# Patient Record
Sex: FEMALE | Race: WHITE | NOT HISPANIC OR LATINO | Employment: OTHER | URBAN - METROPOLITAN AREA
[De-identification: names, ages, dates, MRNs, and addresses within clinical notes are randomized per-mention and may not be internally consistent; named-entity substitution may affect disease eponyms.]

---

## 2018-09-27 ENCOUNTER — HISTORICAL (OUTPATIENT)
Dept: RADIOLOGY | Facility: HOSPITAL | Age: 68
End: 2018-09-27

## 2020-07-22 ENCOUNTER — HISTORICAL (OUTPATIENT)
Dept: ADMINISTRATIVE | Facility: HOSPITAL | Age: 70
End: 2020-07-22

## 2020-07-29 ENCOUNTER — HISTORICAL (OUTPATIENT)
Dept: ADMINISTRATIVE | Facility: HOSPITAL | Age: 70
End: 2020-07-29

## 2020-07-29 LAB
ABS NEUT (OLG): 10.24 X10(3)/MCL (ref 2.1–9.2)
ALBUMIN SERPL-MCNC: 2.3 GM/DL (ref 3.4–5)
ALBUMIN/GLOB SERPL: 0.5 RATIO (ref 1.1–2)
ALP SERPL-CCNC: 64 UNIT/L (ref 40–150)
ALT SERPL-CCNC: 7 UNIT/L (ref 0–55)
AST SERPL-CCNC: 9 UNIT/L (ref 5–34)
BASOPHILS # BLD AUTO: 0.1 X10(3)/MCL (ref 0–0.2)
BASOPHILS NFR BLD AUTO: 1 %
BILIRUB SERPL-MCNC: 0.2 MG/DL
BILIRUBIN DIRECT+TOT PNL SERPL-MCNC: 0.1 MG/DL (ref 0–0.5)
BILIRUBIN DIRECT+TOT PNL SERPL-MCNC: 0.1 MG/DL (ref 0–0.8)
BUN SERPL-MCNC: 13.7 MG/DL (ref 9.8–20.1)
CALCIUM SERPL-MCNC: 8.7 MG/DL (ref 8.4–10.2)
CHLORIDE SERPL-SCNC: 106 MMOL/L (ref 98–107)
CO2 SERPL-SCNC: 25 MMOL/L (ref 23–31)
CREAT SERPL-MCNC: 0.86 MG/DL (ref 0.55–1.02)
CRP SERPL HS-MCNC: 63.18 MG/DL
EOSINOPHIL # BLD AUTO: 0.1 X10(3)/MCL (ref 0–0.9)
EOSINOPHIL NFR BLD AUTO: 0 %
ERYTHROCYTE [DISTWIDTH] IN BLOOD BY AUTOMATED COUNT: 15.9 % (ref 11.5–17)
ERYTHROCYTE [SEDIMENTATION RATE] IN BLOOD: 91 MM/HR (ref 0–20)
EST. AVERAGE GLUCOSE BLD GHB EST-MCNC: 154.2 MG/DL
GLOBULIN SER-MCNC: 4.3 GM/DL (ref 2.4–3.5)
GLUCOSE SERPL-MCNC: 222 MG/DL (ref 82–115)
HBA1C MFR BLD: 7 %
HCT VFR BLD AUTO: 32.5 % (ref 37–47)
HGB BLD-MCNC: 9.6 GM/DL (ref 12–16)
LYMPHOCYTES # BLD AUTO: 2 X10(3)/MCL (ref 0.6–4.6)
LYMPHOCYTES NFR BLD AUTO: 15 %
MCH RBC QN AUTO: 25.1 PG (ref 27–31)
MCHC RBC AUTO-ENTMCNC: 29.5 GM/DL (ref 33–36)
MCV RBC AUTO: 85.1 FL (ref 80–94)
MONOCYTES # BLD AUTO: 0.8 X10(3)/MCL (ref 0.1–1.3)
MONOCYTES NFR BLD AUTO: 6 %
NEUTROPHILS # BLD AUTO: 10.24 X10(3)/MCL (ref 2.1–9.2)
NEUTROPHILS NFR BLD AUTO: 77 %
PLATELET # BLD AUTO: 448 X10(3)/MCL (ref 130–400)
PMV BLD AUTO: 8.5 FL (ref 9.4–12.4)
POTASSIUM SERPL-SCNC: 3.9 MMOL/L (ref 3.5–5.1)
PREALB SERPL-MCNC: 13 MG/DL (ref 14–37)
PROT SERPL-MCNC: 6.6 GM/DL (ref 5.8–7.6)
RBC # BLD AUTO: 3.82 X10(6)/MCL (ref 4.2–5.4)
SODIUM SERPL-SCNC: 137 MMOL/L (ref 136–145)
WBC # SPEC AUTO: 13.2 X10(3)/MCL (ref 4.5–11.5)

## 2020-08-05 ENCOUNTER — HISTORICAL (OUTPATIENT)
Dept: ADMINISTRATIVE | Facility: HOSPITAL | Age: 70
End: 2020-08-05

## 2020-08-12 ENCOUNTER — HISTORICAL (OUTPATIENT)
Dept: ADMINISTRATIVE | Facility: HOSPITAL | Age: 70
End: 2020-08-12

## 2020-08-19 ENCOUNTER — HISTORICAL (OUTPATIENT)
Dept: ADMINISTRATIVE | Facility: HOSPITAL | Age: 70
End: 2020-08-19

## 2020-08-19 LAB
ABS NEUT (OLG): 7.67 X10(3)/MCL (ref 2.1–9.2)
ALBUMIN SERPL-MCNC: 2.4 GM/DL (ref 3.4–4.8)
ALBUMIN/GLOB SERPL: 0.6 RATIO (ref 1.1–2)
ALP SERPL-CCNC: 66 UNIT/L (ref 40–150)
ALT SERPL-CCNC: 6 UNIT/L (ref 0–55)
AST SERPL-CCNC: 10 UNIT/L (ref 5–34)
BASOPHILS # BLD AUTO: 0.07 X10(3)/MCL (ref 0–0.2)
BASOPHILS NFR BLD AUTO: 0.7 % (ref 0–1)
BILIRUB SERPL-MCNC: 0.3 MG/DL (ref 0.2–1.2)
BILIRUBIN DIRECT+TOT PNL SERPL-MCNC: 0.1 MG/DL (ref 0–0.8)
BILIRUBIN DIRECT+TOT PNL SERPL-MCNC: 0.2 MG/DL (ref 0–0.5)
BUN SERPL-MCNC: 11.2 MG/DL (ref 9.8–20.1)
CALCIUM SERPL-MCNC: 9 MG/DL (ref 8.4–10.2)
CHLORIDE SERPL-SCNC: 107 MMOL/L (ref 98–107)
CO2 SERPL-SCNC: 24 MMOL/L (ref 23–31)
CREAT SERPL-MCNC: 0.83 MG/DL (ref 0.57–1.11)
CRP SERPL HS-MCNC: 21.76 MG/L (ref 0–5)
EOSINOPHIL # BLD AUTO: 0.24 X10(3)/MCL (ref 0–0.9)
EOSINOPHIL NFR BLD AUTO: 2.3 % (ref 0–6.4)
ERYTHROCYTE [DISTWIDTH] IN BLOOD BY AUTOMATED COUNT: 16.7 % (ref 11.5–17)
ERYTHROCYTE [SEDIMENTATION RATE] IN BLOOD: 67 MM/HR (ref 0–30)
GLOBULIN SER-MCNC: 4 GM/DL (ref 2.4–3.5)
GLUCOSE SERPL-MCNC: 122 MG/DL (ref 82–115)
HCT VFR BLD AUTO: 30.7 % (ref 37–47)
HGB BLD-MCNC: 9.5 GM/DL (ref 12–16)
IMM GRANULOCYTES # BLD AUTO: 0.05 10*3/UL (ref 0–0.02)
IMM GRANULOCYTES NFR BLD AUTO: 0.5 % (ref 0–0.43)
LYMPHOCYTES # BLD AUTO: 1.46 X10(3)/MCL (ref 0.6–4.6)
LYMPHOCYTES NFR BLD AUTO: 14.2 % (ref 16–44)
MCH RBC QN AUTO: 24.9 PG (ref 27–31)
MCHC RBC AUTO-ENTMCNC: 30.9 GM/DL (ref 33–36)
MCV RBC AUTO: 80.4 FL (ref 80–94)
MONOCYTES # BLD AUTO: 0.8 X10(3)/MCL (ref 0.1–1.3)
MONOCYTES NFR BLD AUTO: 7.8 % (ref 4–12.1)
NEUTROPHILS # BLD AUTO: 7.67 X10(3)/MCL (ref 2.1–9.2)
NEUTROPHILS NFR BLD AUTO: 74.5 % (ref 43–73)
NRBC BLD AUTO-RTO: 0 % (ref 0–0.2)
PLATELET # BLD AUTO: 390 X10(3)/MCL (ref 130–400)
PMV BLD AUTO: 8.7 FL (ref 7.4–10.4)
POTASSIUM SERPL-SCNC: 3.9 MMOL/L (ref 3.5–5.1)
PREALB SERPL-MCNC: 13.2 MG/DL (ref 14–37)
PROT SERPL-MCNC: 6.4 GM/DL (ref 5.8–7.6)
RBC # BLD AUTO: 3.82 X10(6)/MCL (ref 4.2–5.4)
SODIUM SERPL-SCNC: 140 MMOL/L (ref 136–145)
WBC # SPEC AUTO: 10.3 X10(3)/MCL (ref 4.5–11.5)

## 2020-08-21 LAB
APPEARANCE, UA: ABNORMAL
BACTERIA SPEC CULT: ABNORMAL /HPF
BILIRUB UR QL STRIP: NEGATIVE
COLOR UR: ABNORMAL
EST. AVERAGE GLUCOSE BLD GHB EST-MCNC: 154.2 MG/DL
GLUCOSE (UA): NEGATIVE
HBA1C MFR BLD: 7 %
HGB UR QL STRIP: ABNORMAL
KETONES UR QL STRIP: NEGATIVE
LEUKOCYTE ESTERASE UR QL STRIP: ABNORMAL
NITRITE UR QL STRIP: NEGATIVE
PH UR STRIP: 7 [PH] (ref 5–7)
PROT UR QL STRIP: NEGATIVE
RBC #/AREA URNS HPF: ABNORMAL /HPF
SP GR UR STRIP: 1.01 (ref 1–1.03)
SQUAMOUS EPITHELIAL, UA: ABNORMAL /LPF
UROBILINOGEN UR STRIP-ACNC: NEGATIVE
WBC #/AREA URNS HPF: ABNORMAL /HPF

## 2020-08-23 LAB — FINAL CULTURE: NO GROWTH

## 2020-08-24 LAB
ABS NEUT (OLG): 7.31 X10(3)/MCL (ref 2.1–9.2)
ALBUMIN SERPL-MCNC: 2.7 GM/DL (ref 3.4–4.8)
ALBUMIN/GLOB SERPL: 0.7 RATIO (ref 1.1–2)
ALP SERPL-CCNC: 63 UNIT/L (ref 40–150)
ALT SERPL-CCNC: 7 UNIT/L (ref 0–55)
AST SERPL-CCNC: 9 UNIT/L (ref 5–34)
BASOPHILS # BLD AUTO: 0.09 X10(3)/MCL (ref 0–0.2)
BASOPHILS NFR BLD AUTO: 0.8 % (ref 0–1)
BILIRUB SERPL-MCNC: 0.4 MG/DL (ref 0.2–1.2)
BILIRUBIN DIRECT+TOT PNL SERPL-MCNC: 0.2 MG/DL (ref 0–0.5)
BILIRUBIN DIRECT+TOT PNL SERPL-MCNC: 0.2 MG/DL (ref 0–0.8)
BUN SERPL-MCNC: 23.7 MG/DL (ref 9.8–20.1)
CALCIUM SERPL-MCNC: 9.3 MG/DL (ref 8.4–10.2)
CHLORIDE SERPL-SCNC: 104 MMOL/L (ref 98–107)
CO2 SERPL-SCNC: 22 MMOL/L (ref 23–31)
CREAT SERPL-MCNC: 1.19 MG/DL (ref 0.57–1.11)
CRP SERPL HS-MCNC: 11.69 MG/L (ref 0–5)
EOSINOPHIL # BLD AUTO: 0.24 X10(3)/MCL (ref 0–0.9)
EOSINOPHIL NFR BLD AUTO: 2.2 % (ref 0–6.4)
ERYTHROCYTE [DISTWIDTH] IN BLOOD BY AUTOMATED COUNT: 17.1 % (ref 11.5–17)
ERYTHROCYTE [SEDIMENTATION RATE] IN BLOOD: 55 MM/HR (ref 0–30)
GLOBULIN SER-MCNC: 4.1 GM/DL (ref 2.4–3.5)
GLUCOSE SERPL-MCNC: 171 MG/DL (ref 82–115)
HCT VFR BLD AUTO: 34.2 % (ref 37–47)
HGB BLD-MCNC: 10.4 GM/DL (ref 12–16)
IMM GRANULOCYTES # BLD AUTO: 0.04 10*3/UL (ref 0–0.02)
IMM GRANULOCYTES NFR BLD AUTO: 0.4 % (ref 0–0.43)
LYMPHOCYTES # BLD AUTO: 2.52 X10(3)/MCL (ref 0.6–4.6)
LYMPHOCYTES NFR BLD AUTO: 22.8 % (ref 16–44)
MCH RBC QN AUTO: 24.4 PG (ref 27–31)
MCHC RBC AUTO-ENTMCNC: 30.4 GM/DL (ref 33–36)
MCV RBC AUTO: 80.1 FL (ref 80–94)
MONOCYTES # BLD AUTO: 0.84 X10(3)/MCL (ref 0.1–1.3)
MONOCYTES NFR BLD AUTO: 7.6 % (ref 4–12.1)
NEUTROPHILS # BLD AUTO: 7.31 X10(3)/MCL (ref 2.1–9.2)
NEUTROPHILS NFR BLD AUTO: 66.2 % (ref 43–73)
NRBC BLD AUTO-RTO: 0 % (ref 0–0.2)
PLATELET # BLD AUTO: 291 X10(3)/MCL (ref 130–400)
PMV BLD AUTO: 9 FL (ref 7.4–10.4)
POTASSIUM SERPL-SCNC: 4 MMOL/L (ref 3.5–5.1)
PREALB SERPL-MCNC: 15.4 MG/DL (ref 14–37)
PROT SERPL-MCNC: 6.8 GM/DL (ref 5.8–7.6)
RBC # BLD AUTO: 4.27 X10(6)/MCL (ref 4.2–5.4)
SODIUM SERPL-SCNC: 139 MMOL/L (ref 136–145)
WBC # SPEC AUTO: 11 X10(3)/MCL (ref 4.5–11.5)

## 2020-08-31 LAB
ABS NEUT (OLG): 5.63 X10(3)/MCL (ref 2.1–9.2)
ALBUMIN SERPL-MCNC: 3 GM/DL (ref 3.4–4.8)
ALBUMIN/GLOB SERPL: 0.8 RATIO (ref 1.1–2)
ALP SERPL-CCNC: 69 UNIT/L (ref 40–150)
ALT SERPL-CCNC: 10 UNIT/L (ref 0–55)
AST SERPL-CCNC: 14 UNIT/L (ref 5–34)
BASOPHILS # BLD AUTO: 0.08 X10(3)/MCL (ref 0–0.2)
BASOPHILS NFR BLD AUTO: 0.8 % (ref 0–1)
BILIRUB SERPL-MCNC: 0.2 MG/DL (ref 0.2–1.2)
BILIRUBIN DIRECT+TOT PNL SERPL-MCNC: 0 MG/DL (ref 0–0.8)
BILIRUBIN DIRECT+TOT PNL SERPL-MCNC: 0.2 MG/DL (ref 0–0.5)
BUN SERPL-MCNC: 23.1 MG/DL (ref 9.8–20.1)
CALCIUM SERPL-MCNC: 9.6 MG/DL (ref 8.4–10.2)
CHLORIDE SERPL-SCNC: 100 MMOL/L (ref 98–107)
CO2 SERPL-SCNC: 24 MMOL/L (ref 23–31)
CREAT SERPL-MCNC: 1.02 MG/DL (ref 0.57–1.11)
CRP SERPL HS-MCNC: 16.75 MG/L (ref 0–5)
EOSINOPHIL # BLD AUTO: 0.2 X10(3)/MCL (ref 0–0.9)
EOSINOPHIL NFR BLD AUTO: 2 % (ref 0–6.4)
ERYTHROCYTE [DISTWIDTH] IN BLOOD BY AUTOMATED COUNT: 17 % (ref 11.5–17)
ERYTHROCYTE [SEDIMENTATION RATE] IN BLOOD: 48 MM/HR (ref 0–30)
GLOBULIN SER-MCNC: 4 GM/DL (ref 2.4–3.5)
GLUCOSE SERPL-MCNC: 222 MG/DL (ref 82–115)
HCT VFR BLD AUTO: 33.8 % (ref 37–47)
HGB BLD-MCNC: 10.5 GM/DL (ref 12–16)
IMM GRANULOCYTES # BLD AUTO: 0.03 10*3/UL (ref 0–0.02)
IMM GRANULOCYTES NFR BLD AUTO: 0.3 % (ref 0–0.43)
LYMPHOCYTES # BLD AUTO: 3.26 X10(3)/MCL (ref 0.6–4.6)
LYMPHOCYTES NFR BLD AUTO: 32.8 % (ref 16–44)
MCH RBC QN AUTO: 24.7 PG (ref 27–31)
MCHC RBC AUTO-ENTMCNC: 31.1 GM/DL (ref 33–36)
MCV RBC AUTO: 79.5 FL (ref 80–94)
MONOCYTES # BLD AUTO: 0.75 X10(3)/MCL (ref 0.1–1.3)
MONOCYTES NFR BLD AUTO: 7.5 % (ref 4–12.1)
NEUTROPHILS # BLD AUTO: 5.63 X10(3)/MCL (ref 2.1–9.2)
NEUTROPHILS NFR BLD AUTO: 56.6 % (ref 43–73)
NRBC BLD AUTO-RTO: 0 % (ref 0–0.2)
PLATELET # BLD AUTO: 337 X10(3)/MCL (ref 130–400)
PMV BLD AUTO: 10.1 FL (ref 7.4–10.4)
POTASSIUM SERPL-SCNC: 4.2 MMOL/L (ref 3.5–5.1)
PREALB SERPL-MCNC: 17.7 MG/DL (ref 14–37)
PROT SERPL-MCNC: 7 GM/DL (ref 5.8–7.6)
RBC # BLD AUTO: 4.25 X10(6)/MCL (ref 4.2–5.4)
SODIUM SERPL-SCNC: 135 MMOL/L (ref 136–145)
WBC # SPEC AUTO: 10 X10(3)/MCL (ref 4.5–11.5)

## 2020-09-07 LAB
ABS NEUT (OLG): 5.85 X10(3)/MCL (ref 2.1–9.2)
ALBUMIN SERPL-MCNC: 3 GM/DL (ref 3.4–4.8)
ALBUMIN/GLOB SERPL: 0.8 RATIO (ref 1.1–2)
ALP SERPL-CCNC: 70 UNIT/L (ref 40–150)
ALT SERPL-CCNC: 10 UNIT/L (ref 0–55)
AST SERPL-CCNC: 10 UNIT/L (ref 5–34)
BASOPHILS # BLD AUTO: 0.07 X10(3)/MCL (ref 0–0.2)
BASOPHILS NFR BLD AUTO: 0.7 % (ref 0–1)
BILIRUB SERPL-MCNC: 0.3 MG/DL (ref 0.2–1.2)
BILIRUBIN DIRECT+TOT PNL SERPL-MCNC: 0.1 MG/DL (ref 0–0.8)
BILIRUBIN DIRECT+TOT PNL SERPL-MCNC: 0.2 MG/DL (ref 0–0.5)
BUN SERPL-MCNC: 26.8 MG/DL (ref 9.8–20.1)
CALCIUM SERPL-MCNC: 9.6 MG/DL (ref 8.4–10.2)
CHLORIDE SERPL-SCNC: 100 MMOL/L (ref 98–107)
CO2 SERPL-SCNC: 23 MMOL/L (ref 23–31)
CREAT SERPL-MCNC: 0.96 MG/DL (ref 0.57–1.11)
CRP SERPL HS-MCNC: 21.23 MG/L (ref 0–5)
EOSINOPHIL # BLD AUTO: 0.29 X10(3)/MCL (ref 0–0.9)
EOSINOPHIL NFR BLD AUTO: 2.9 % (ref 0–6.4)
ERYTHROCYTE [DISTWIDTH] IN BLOOD BY AUTOMATED COUNT: 16.8 % (ref 11.5–17)
ERYTHROCYTE [SEDIMENTATION RATE] IN BLOOD: 48 MM/HR (ref 0–30)
GLOBULIN SER-MCNC: 4 GM/DL (ref 2.4–3.5)
GLUCOSE SERPL-MCNC: 113 MG/DL (ref 82–115)
HCT VFR BLD AUTO: 36.2 % (ref 37–47)
HGB BLD-MCNC: 11.2 GM/DL (ref 12–16)
IMM GRANULOCYTES # BLD AUTO: 0.05 10*3/UL (ref 0–0.02)
IMM GRANULOCYTES NFR BLD AUTO: 0.5 % (ref 0–0.43)
LYMPHOCYTES # BLD AUTO: 3.01 X10(3)/MCL (ref 0.6–4.6)
LYMPHOCYTES NFR BLD AUTO: 30 % (ref 16–44)
MCH RBC QN AUTO: 24.8 PG (ref 27–31)
MCHC RBC AUTO-ENTMCNC: 30.9 GM/DL (ref 33–36)
MCV RBC AUTO: 80.3 FL (ref 80–94)
MONOCYTES # BLD AUTO: 0.75 X10(3)/MCL (ref 0.1–1.3)
MONOCYTES NFR BLD AUTO: 7.5 % (ref 4–12.1)
NEUTROPHILS # BLD AUTO: 5.85 X10(3)/MCL (ref 2.1–9.2)
NEUTROPHILS NFR BLD AUTO: 58.4 % (ref 43–73)
NRBC BLD AUTO-RTO: 0 % (ref 0–0.2)
PLATELET # BLD AUTO: 307 X10(3)/MCL (ref 130–400)
PMV BLD AUTO: 9.9 FL (ref 7.4–10.4)
POTASSIUM SERPL-SCNC: 4.2 MMOL/L (ref 3.5–5.1)
PREALB SERPL-MCNC: 17 MG/DL (ref 14–37)
PROT SERPL-MCNC: 7 GM/DL (ref 5.8–7.6)
RBC # BLD AUTO: 4.51 X10(6)/MCL (ref 4.2–5.4)
SODIUM SERPL-SCNC: 138 MMOL/L (ref 136–145)
WBC # SPEC AUTO: 10 X10(3)/MCL (ref 4.5–11.5)

## 2020-09-17 ENCOUNTER — HISTORICAL (OUTPATIENT)
Dept: ADMINISTRATIVE | Facility: HOSPITAL | Age: 70
End: 2020-09-17

## 2020-09-24 ENCOUNTER — HISTORICAL (OUTPATIENT)
Dept: ADMINISTRATIVE | Facility: HOSPITAL | Age: 70
End: 2020-09-24

## 2020-10-01 ENCOUNTER — HISTORICAL (OUTPATIENT)
Dept: ADMINISTRATIVE | Facility: HOSPITAL | Age: 70
End: 2020-10-01

## 2020-10-08 ENCOUNTER — HISTORICAL (OUTPATIENT)
Dept: ADMINISTRATIVE | Facility: HOSPITAL | Age: 70
End: 2020-10-08

## 2020-10-14 ENCOUNTER — HISTORICAL (OUTPATIENT)
Dept: ADMINISTRATIVE | Facility: HOSPITAL | Age: 70
End: 2020-10-14

## 2020-10-21 ENCOUNTER — HISTORICAL (OUTPATIENT)
Dept: ADMINISTRATIVE | Facility: HOSPITAL | Age: 70
End: 2020-10-21

## 2020-10-28 ENCOUNTER — HISTORICAL (OUTPATIENT)
Dept: ADMINISTRATIVE | Facility: HOSPITAL | Age: 70
End: 2020-10-28

## 2020-11-04 ENCOUNTER — HISTORICAL (OUTPATIENT)
Dept: MEDSURG UNIT | Facility: HOSPITAL | Age: 70
End: 2020-11-04

## 2020-11-05 LAB
ABS NEUT (OLG): 7.53 X10(3)/MCL (ref 2.1–9.2)
ALBUMIN SERPL-MCNC: 2.8 GM/DL (ref 3.4–4.8)
ALBUMIN/GLOB SERPL: 0.7 RATIO (ref 1.1–2)
ALP SERPL-CCNC: 92 UNIT/L (ref 40–150)
ALT SERPL-CCNC: 10 UNIT/L (ref 0–55)
AST SERPL-CCNC: 13 UNIT/L (ref 5–34)
BASOPHILS # BLD AUTO: 0.1 X10(3)/MCL (ref 0–0.2)
BASOPHILS NFR BLD AUTO: 1 %
BILIRUB SERPL-MCNC: 0.6 MG/DL
BILIRUBIN DIRECT+TOT PNL SERPL-MCNC: 0.2 MG/DL (ref 0–0.5)
BILIRUBIN DIRECT+TOT PNL SERPL-MCNC: 0.4 MG/DL (ref 0–0.8)
BUN SERPL-MCNC: 11.3 MG/DL (ref 9.8–20.1)
CALCIUM SERPL-MCNC: 8.8 MG/DL (ref 8.4–10.2)
CHLORIDE SERPL-SCNC: 104 MMOL/L (ref 98–107)
CO2 SERPL-SCNC: 26 MMOL/L (ref 23–31)
CREAT SERPL-MCNC: 0.83 MG/DL (ref 0.55–1.02)
EOSINOPHIL # BLD AUTO: 0.1 X10(3)/MCL (ref 0–0.9)
EOSINOPHIL NFR BLD AUTO: 1 %
ERYTHROCYTE [DISTWIDTH] IN BLOOD BY AUTOMATED COUNT: 17.9 % (ref 11.5–17)
GLOBULIN SER-MCNC: 3.9 GM/DL (ref 2.4–3.5)
GLUCOSE SERPL-MCNC: 93 MG/DL (ref 82–115)
HCT VFR BLD AUTO: 35 % (ref 37–47)
HGB BLD-MCNC: 10.8 GM/DL (ref 12–16)
LYMPHOCYTES # BLD AUTO: 1.6 X10(3)/MCL (ref 0.6–4.6)
LYMPHOCYTES NFR BLD AUTO: 15 %
MCH RBC QN AUTO: 24.3 PG (ref 27–31)
MCHC RBC AUTO-ENTMCNC: 30.9 GM/DL (ref 33–36)
MCV RBC AUTO: 78.8 FL (ref 80–94)
MONOCYTES # BLD AUTO: 0.9 X10(3)/MCL (ref 0.1–1.3)
MONOCYTES NFR BLD AUTO: 9 %
NEUTROPHILS # BLD AUTO: 7.53 X10(3)/MCL (ref 2.1–9.2)
NEUTROPHILS NFR BLD AUTO: 74 %
PLATELET # BLD AUTO: 351 X10(3)/MCL (ref 130–400)
PMV BLD AUTO: 9 FL (ref 9.4–12.4)
POTASSIUM SERPL-SCNC: 3.8 MMOL/L (ref 3.5–5.1)
PROT SERPL-MCNC: 6.7 GM/DL (ref 5.8–7.6)
RBC # BLD AUTO: 4.44 X10(6)/MCL (ref 4.2–5.4)
SODIUM SERPL-SCNC: 138 MMOL/L (ref 136–145)
WBC # SPEC AUTO: 10.2 X10(3)/MCL (ref 4.5–11.5)

## 2020-11-11 ENCOUNTER — HISTORICAL (OUTPATIENT)
Dept: ADMINISTRATIVE | Facility: HOSPITAL | Age: 70
End: 2020-11-11

## 2020-11-18 ENCOUNTER — HISTORICAL (OUTPATIENT)
Dept: ADMINISTRATIVE | Facility: HOSPITAL | Age: 70
End: 2020-11-18

## 2020-11-25 ENCOUNTER — HISTORICAL (OUTPATIENT)
Dept: ADMINISTRATIVE | Facility: HOSPITAL | Age: 70
End: 2020-11-25

## 2020-12-02 ENCOUNTER — HISTORICAL (OUTPATIENT)
Dept: ADMINISTRATIVE | Facility: HOSPITAL | Age: 70
End: 2020-12-02

## 2020-12-09 ENCOUNTER — HISTORICAL (OUTPATIENT)
Dept: ADMINISTRATIVE | Facility: HOSPITAL | Age: 70
End: 2020-12-09

## 2020-12-23 ENCOUNTER — HISTORICAL (OUTPATIENT)
Dept: ADMINISTRATIVE | Facility: HOSPITAL | Age: 70
End: 2020-12-23

## 2021-01-06 ENCOUNTER — HISTORICAL (OUTPATIENT)
Dept: ADMINISTRATIVE | Facility: HOSPITAL | Age: 71
End: 2021-01-06

## 2021-01-20 ENCOUNTER — HISTORICAL (OUTPATIENT)
Dept: ADMINISTRATIVE | Facility: HOSPITAL | Age: 71
End: 2021-01-20

## 2021-02-03 ENCOUNTER — HISTORICAL (OUTPATIENT)
Dept: ADMINISTRATIVE | Facility: HOSPITAL | Age: 71
End: 2021-02-03

## 2021-05-19 LAB
HEMOCCULT SP1 STL QL: NEGATIVE
HEMOCCULT SP2 STL QL: NEGATIVE
HEMOCCULT SP3 STL QL: NEGATIVE

## 2021-06-03 ENCOUNTER — HISTORICAL (OUTPATIENT)
Dept: ADMINISTRATIVE | Facility: HOSPITAL | Age: 71
End: 2021-06-03

## 2021-06-03 LAB
ALBUMIN SERPL-MCNC: 3.8 G/DL (ref 3.8–4.8)
ALBUMIN/GLOB SERPL: 1.2 {RATIO} (ref 1.2–2.2)
ALP SERPL-CCNC: 78 IU/L (ref 48–121)
ALT SERPL-CCNC: 9 IU/L (ref 0–32)
AST SERPL-CCNC: 14 IU/L (ref 0–40)
BASOPHILS # BLD AUTO: 0.1 X10E3/UL (ref 0–0.2)
BASOPHILS NFR BLD AUTO: 1 %
BILIRUB SERPL-MCNC: 0.2 MG/DL (ref 0–1.2)
BUN SERPL-MCNC: 19 MG/DL (ref 8–27)
CALCIUM SERPL-MCNC: 9.4 MG/DL (ref 8.7–10.3)
CHLORIDE SERPL-SCNC: 105 MMOL/L (ref 96–106)
CHOLEST SERPL-MCNC: 88 MG/DL (ref 100–199)
CHOLEST/HDLC SERPL: 3 RATIO (ref 0–4.4)
CO2 SERPL-SCNC: 18 MMOL/L (ref 20–29)
CREAT SERPL-MCNC: 0.96 MG/DL (ref 0.57–1)
CREAT/UREA NIT SERPL: 20 (ref 12–28)
DEPRECATED CALCIDIOL+CALCIFEROL SERPL-MC: 16.1 NG/ML (ref 30–100)
EOSINOPHIL # BLD AUTO: 1 X10E3/UL (ref 0–0.4)
EOSINOPHIL NFR BLD AUTO: 8 %
ERYTHROCYTE [DISTWIDTH] IN BLOOD BY AUTOMATED COUNT: 16.6 % (ref 11.7–15.4)
GLOBULIN SER-MCNC: 3.1 G/DL (ref 1.5–4.5)
GLUCOSE SERPL-MCNC: 102 MG/DL (ref 65–99)
HCT VFR BLD AUTO: 40.7 % (ref 34–46.6)
HDLC SERPL-MCNC: 29 MG/DL
HGB BLD-MCNC: 12.9 G/DL (ref 11.1–15.9)
LDLC SERPL CALC-MCNC: 36 MG/DL (ref 0–99)
LYMPHOCYTES # BLD AUTO: 3.2 X10E3/UL (ref 0.7–3.1)
LYMPHOCYTES NFR BLD AUTO: 27 %
MCH RBC QN AUTO: 27.4 PG (ref 26.6–33)
MCHC RBC AUTO-ENTMCNC: 31.7 G/DL (ref 31.5–35.7)
MCV RBC AUTO: 87 FL (ref 79–97)
MICROALBUMIN/CREAT RATIO PNL UR: <23 MG/G CREAT (ref 0–29)
MONOCYTES # BLD AUTO: 0.8 X10E3/UL (ref 0.1–0.9)
MONOCYTES NFR BLD AUTO: 6 %
NEUTROPHILS # BLD AUTO: 6.7 X10E3/UL (ref 1.4–7)
NEUTROPHILS NFR BLD AUTO: 58 %
PLATELET # BLD AUTO: 307 X10E3/UL (ref 150–450)
POTASSIUM SERPL-SCNC: 4.6 MMOL/L (ref 3.5–5.2)
PREALB SERPL-MCNC: 13 MG/DL (ref 10–36)
PROT SERPL-MCNC: 6.9 G/DL (ref 6–8.5)
RBC # BLD AUTO: 4.7 X10(6)/MCL (ref 3.77–5.28)
SODIUM SERPL-SCNC: 142 MMOL/L (ref 134–144)
TRIGL SERPL-MCNC: 126 MG/DL (ref 0–149)
TSH SERPL-ACNC: 3.19 MIU/ML (ref 0.45–4.5)
VLDLC SERPL CALC-MCNC: 23 MG/DL (ref 5–40)
WBC # SPEC AUTO: 12 X10E3/UL (ref 3.4–10.8)

## 2021-09-15 ENCOUNTER — HISTORICAL (OUTPATIENT)
Dept: ADMINISTRATIVE | Facility: HOSPITAL | Age: 71
End: 2021-09-15

## 2021-09-15 LAB — DEPRECATED CALCIDIOL+CALCIFEROL SERPL-MC: 31.6 NG/ML (ref 30–100)

## 2021-12-08 ENCOUNTER — HISTORICAL (OUTPATIENT)
Dept: ADMINISTRATIVE | Facility: HOSPITAL | Age: 71
End: 2021-12-08

## 2021-12-08 LAB
BASOPHILS # BLD AUTO: 0.1 X10E3/UL (ref 0–0.2)
BASOPHILS NFR BLD AUTO: 1 %
DEPRECATED CALCIDIOL+CALCIFEROL SERPL-MC: 25.5 NG/ML (ref 30–100)
EOSINOPHIL # BLD AUTO: 0.2 X10E3/UL (ref 0–0.4)
EOSINOPHIL NFR BLD AUTO: 2 %
ERYTHROCYTE [DISTWIDTH] IN BLOOD BY AUTOMATED COUNT: 15.1 % (ref 11.7–15.4)
HBA1C MFR BLD: 8.3 % (ref 4.8–5.6)
HCT VFR BLD AUTO: 39 % (ref 34–46.6)
HGB BLD-MCNC: 11.8 G/DL (ref 11.1–15.9)
LYMPHOCYTES # BLD AUTO: 1.7 X10E3/UL (ref 0.7–3.1)
LYMPHOCYTES NFR BLD AUTO: 13 %
MCH RBC QN AUTO: 27.7 PG (ref 26.6–33)
MCHC RBC AUTO-ENTMCNC: 30.3 G/DL (ref 31.5–35.7)
MCV RBC AUTO: 92 FL (ref 79–97)
MONOCYTES # BLD AUTO: 0.8 X10E3/UL (ref 0.1–0.9)
MONOCYTES NFR BLD AUTO: 6 %
NEUTROPHILS # BLD AUTO: 9.9 X10E3/UL (ref 1.4–7)
NEUTROPHILS NFR BLD AUTO: 78 %
PLATELET # BLD AUTO: 311 X10E3/UL (ref 150–450)
PREALB SERPL-MCNC: 16 MG/DL (ref 10–36)
RBC # BLD AUTO: 4.26 X10(6)/MCL (ref 3.77–5.28)
WBC # SPEC AUTO: 12.7 X10E3/UL (ref 3.4–10.8)

## 2022-02-16 ENCOUNTER — HISTORICAL (OUTPATIENT)
Dept: ADMINISTRATIVE | Facility: HOSPITAL | Age: 72
End: 2022-02-16

## 2022-02-16 LAB
ALBUMIN SERPL-MCNC: 2.9 G/DL (ref 3.4–4.8)
ALBUMIN/GLOB SERPL: 0.9 {RATIO} (ref 1.1–2)
ALP SERPL-CCNC: 66 U/L (ref 40–150)
ALT SERPL-CCNC: 9 U/L (ref 0–55)
AST SERPL-CCNC: 17 U/L (ref 5–34)
BILIRUB SERPL-MCNC: 0.3 MG/DL
BILIRUBIN DIRECT+TOT PNL SERPL-MCNC: 0.1 (ref 0–0.5)
BILIRUBIN DIRECT+TOT PNL SERPL-MCNC: 0.2 (ref 0–0.8)
BUN SERPL-MCNC: 10.3 MG/DL (ref 9.8–20.1)
CALCIUM SERPL-MCNC: 9.3 MG/DL (ref 8.7–10.5)
CHLORIDE SERPL-SCNC: 104 MMOL/L (ref 98–107)
CO2 SERPL-SCNC: 24 MMOL/L (ref 23–31)
CREAT SERPL-MCNC: 1.11 MG/DL (ref 0.55–1.02)
ERYTHROCYTE [DISTWIDTH] IN BLOOD BY AUTOMATED COUNT: 14.6 % (ref 11.5–17)
GLOBULIN SER-MCNC: 3.3 G/DL (ref 2.4–3.5)
GLUCOSE SERPL-MCNC: 136 MG/DL (ref 82–115)
HCT VFR BLD AUTO: 38.9 % (ref 37–47)
HEMOLYSIS INTERF INDEX SERPL-ACNC: 13
HGB BLD-MCNC: 12.5 G/DL (ref 12–16)
ICTERIC INTERF INDEX SERPL-ACNC: 0
LIPEMIC INTERF INDEX SERPL-ACNC: 8
MCH RBC QN AUTO: 27.6 PG (ref 27–31)
MCHC RBC AUTO-ENTMCNC: 32.1 G/DL (ref 33–36)
MCV RBC AUTO: 85.9 FL (ref 80–94)
PLATELET # BLD AUTO: 368 10*3/UL (ref 130–400)
PMV BLD AUTO: 9.4 FL (ref 9.4–12.4)
POTASSIUM SERPL-SCNC: 4.8 MMOL/L (ref 3.5–5.1)
PROT SERPL-MCNC: 6.2 G/DL (ref 5.8–7.6)
RBC # BLD AUTO: 4.53 10*6/UL (ref 4.2–5.4)
SODIUM SERPL-SCNC: 139 MMOL/L (ref 136–145)
WBC # SPEC AUTO: 11 10*3/UL (ref 4.5–11.5)

## 2022-02-23 ENCOUNTER — HISTORICAL (OUTPATIENT)
Dept: ADMINISTRATIVE | Facility: HOSPITAL | Age: 72
End: 2022-02-23

## 2022-02-25 ENCOUNTER — HISTORICAL (OUTPATIENT)
Dept: ADMINISTRATIVE | Facility: HOSPITAL | Age: 72
End: 2022-02-25

## 2022-02-25 LAB
CBG: 73 (ref 70–115)
CBG: 79 (ref 70–115)
CBG: 95 (ref 70–115)

## 2022-04-07 ENCOUNTER — HISTORICAL (OUTPATIENT)
Dept: ADMINISTRATIVE | Facility: HOSPITAL | Age: 72
End: 2022-04-07
Payer: MEDICARE

## 2022-04-24 VITALS
OXYGEN SATURATION: 96 % | WEIGHT: 181.88 LBS | DIASTOLIC BLOOD PRESSURE: 64 MMHG | HEIGHT: 61 IN | SYSTOLIC BLOOD PRESSURE: 124 MMHG | BODY MASS INDEX: 34.34 KG/M2

## 2022-04-30 NOTE — DISCHARGE SUMMARY
Patient:   Niurka Mata            MRN: [id number]            FIN: [id number]               Age:   70 years     Sex:  Female     :  1950   Associated Diagnoses:   None   Author:   Jarek Cordero      Date of Service: 2020      Discharge Information      Discharge Summary Information   Date of Admission: 2020  Date of Discharge: 2020  Admit Diagnosis: Sacral pressure ulcer         Bilateral upper inner buttock pressure ulcers         Left heel diabetic ulcer         PVD         DM type II         Neuropathy         Chronic bilateral lower extremity lymphedema/venous stasis         Protein calorie malnutrition         Physical deconditioning         HLD         Cognitive deficit         HTN         Microcytic anemia         GERD  Discharge Diagnosis: Sacral pressure ulcer (improved)           Bilateral upper inner buttock pressure ulcers (improved)           Left heel diabetic ulcer (improved)           PVD (stable)            DM type II (stable)            Neuropathy (stable)            Chronic bilateral lower extremity lymphedema/venous stasis (stable)            Protein calorie malnutrition (stable)           Physical deconditioning (improved)           HLD (stable)            Cognitive deficit (stable)            HTN (stable)            Microcytic anemia (improved)           GERD (stable)   Internal Medicine (attending): Roney Leal MD  Wound care: [facility]  Podiatry: Rudi Mcgill D.P.M.    OUTPATIENT PROVIDERS  PCP: Tulio Leal  Podiatry: Rudi Mcgill D.P.M.      Hospital Course   70-year-old white female transferred from home to [facility] ([facility] inpatient LTAC on 2020 per referral by [facility] due to sacral wound, buttocks ulcers, and left heel ulcer.  Wound culture on 2020 significant for Klebsiella pneumoniae and Enterococcus faecalis.  Augmentin initiated.  Hospitalized at [facility] on 2020 with sepsis, UTI, and  lower extremity cellulitis.  Reportedly had a left ulcer and treated by podiatry.  Ultrasound at that time significant for bilateral CFA stenosis.     PMH significant for PAD s/p bilateral stents in 6/2019, obesity, poorly controlled DM type II, neuropathy, chronic BL LE lymphedema, and microcytic anemia.  Tolerated transfer to [facility] ([facility] inpatient LTAC on 8/18 without incident.    During inpatient course patient continued diabetic therapy with completion date on 8/26.  Sleep hygiene, bowel maintenance, and appetite remained at goal.  Wounds continue to improve.  Pain management well managed with home regimen tramadol 50 mg twice daily and Norco 10 mg 3 times daily.  Vital signs and CBGs remained at goal.  Inflammatory enzymes continue to trend downward.  H&H continues to trend up.  Prealbumin continues to trend up.  Lab work overall unremarkable.  Med reconciliation completed.  Discharge orders initiated.  Stable for transfer home with home health.  To follow-up with scheduled appointments documented below.    Chief Complaint: Sacral ulcer (Klebsiella pneumoniae), buttocks ulcers, and left heel diabetic ulcer       Physical Examination      Vital Signs (last 24 hrs)_____  Last Charted___________  Temp Oral         36.5 DegC  (SEP 11 04:00)  Heart Rate Peripheral   69 bpm  (SEP 11 04:00)  Resp Rate             20 br/min  (SEP 11 04:00)  SBP      104 mmHg  (SEP 11 04:00)  DBP      62 mmHg  (SEP 11 04:00)  SpO2      96 %  (SEP 11 04:00)     General:  Alert and oriented, No acute distress.    Eye:  Vision unchanged.    HENT:  Normocephalic.    Neck:  Supple.    Respiratory:  Lungs are clear to auscultation, Respirations are non-labored, Breath sounds are equal, Symmetrical chest wall expansion, No chest wall tenderness.    Cardiovascular:  Normal rate, Regular rhythm, No murmur, Normal peripheral perfusion, No edema.    Gastrointestinal:  Soft, Non-tender, Normal bowel sounds.     Musculoskeletal:  Generalized weakness and muscle atrophy, Dressing to LLE clean and intact.    Integumentary:  Warm, Dry, Pressure related injury   Thigh Left Posterior Pressure ulcer - Incision, Wound Pressure Ulcer Stage: Unstageable  Sacrum Pressure ulcer - Incision, Wound Pressure Ulcer Stage: III  Buttock Right Distal - Incision, Wound Pressure Ulcer Stage: II, Sacral wound VAC to suction, Mepilex dressing to bilateral lower buttocks, Dressing to LLE clean and intact.    Neurologic:  Alert, Oriented, Normal sensory, Normal motor function, No focal deficits, Cranial Nerves II-XII are grossly intact.    Cognition and Speech:  Oriented, Speech clear and coherent, Functional cognition intact.    Psychiatric:  Cooperative, Appropriate mood & affect, Normal judgment, Non-suicidal.        Results Review   Laboratory Results   Today's Lab Results : PowerNote Discrete Results   9/7/2020 3:20 CDT        WBC                       10.0 x10(3)/mcL                             RBC                       4.51 x10(6)/mcL                             Hgb                       11.2 gm/dL  LOW                             Hct                       36.2 %  LOW                             Platelet                  307 x10(3)/mcL                             MCV                       80.3 fL                             MCH                       24.8 pg  LOW                             MCHC                      30.9 gm/dL  LOW                             RDW                       16.8 %                             MPV                       9.9 fL                             Abs Neut                  5.85 x10(3)/mcL                             Neutro Auto               58.4 %                             Lymph Auto                30.0 %                             Mono Auto                 7.5 %                             Eos Auto                  2.9 %                             Abs Eos                   0.29 x10(3)/mcL                              Basophil Auto             0.7 %                             Abs Neutro                5.85 x10(3)/mcL                             Abs Lymph                 3.01 x10(3)/mcL                             Abs Mono                  0.75 x10(3)/mcL                             Abs Baso                  0.07 x10(3)/mcL                             NRBC%                     0.0 %                             IG%                       0.500 %  HI                             IG#                       0.0500  HI                             Sed Rate                  48 mm/hr  HI                             Sodium Lvl                138 mmol/L                             Potassium Lvl             4.2 mmol/L                             Chloride                  100 mmol/L                             CO2                       23 mmol/L                             Calcium Lvl               9.6 mg/dL                             Glucose Lvl               113 mg/dL                             BUN                       26.8 mg/dL  HI                             Creatinine                0.96 mg/dL                             eGFR-AA                   >60  NA                             eGFR-SARA                  >60  NA                             Bili Total                0.3 mg/dL                             Bili Direct               0.2 mg/dL                             Bili Indirect             0.10 mg/dL                             AST                       10 unit/L                             ALT                       10 unit/L                             Alk Phos                  70 unit/L                             Total Protein             7.0 gm/dL                             Albumin Lvl               3.0 gm/dL  LOW                             Globulin                  4.0 gm/dL  HI                             A/G Ratio                 0.8 ratio  LOW                             CRP High Sens             21.23 mg/L   HI                                   Prealbumin                17.0 mg/dL      Discharge Plan   Discharge Summary Plan   Discharge Status: improved.        Location: Discharge to home with HH     Medications: See discharge medicine reconciliation     Activity: as tolerated     Diet: ADA     Instructions:  Take all medications as prescribed.          Attend appointments as scheduled.          Return to ED if symptoms worsen, or if t > 100.4.     Education:  Sacral ulcer.  PVD.  DM type II.  Microcytic anemia.     Follow-up: Tulio Leal MD on 9/15/2020 at 1315         [facility] on 9/17/2020 10:00         Rudi Mcgill MD on 9/18/2020 at 0 945    Discussed plan of care, and patient communicated understanding. Agreed to comply with recommendations.    Discharge Time: 46 minutes

## 2022-04-30 NOTE — CONSULTS
DATE OF CONSULT:  2020    Ms. Mata is a 70-year-old  female who is known to me from a previous admission at [facility] about 4 or 5 months ago.  She had been admitted with a cellulitis to both lower extremities, along with chronic stasis wounds and an infected left heel wound.  She underwent debridement while there and patient's desires were to live at home.  She has been having home health daily to take care of the lower extremity wounds, but also the sacral wounds.  There apparently was some decompensation in the sacral area requiring transfer here.  She has been followed [facility].  I have been asked to see her concerning lower extremity wounds.  She does see [facility].    PAST MEDICAL HISTORY:  Notable for chronic lymphedema, debility, hypertension, anemia, GERD, hyperlipidemia, peripheral artery disease, chronic venous insufficiency, diabetes with neuropathy, chronic decubiti.    SURGICAL HISTORY:  She has had previous hip replacement, tonsillectomy, debridements of lower extremity wounds, PTA and stenting procedures of the extremities, hysterectomy, , back surgery, vitrectomy.    MEDICATIONS:  As per the chart.    ALLERGIES:  ADHESIVES.     SOCIAL HISTORY:  She is a reformed smoker.  Denies IV drug abuse or alcohol consumption.  She currently lives at home.  States she lives by herself.    FAMILY HISTORY:  Noncontributory.    PHYSICAL EXAMINATION:  GENERAL APPEARANCE:  Reveals an elderly debilitated-looking female currently lying in bed, conversive, does not appear to be in any distress.     CURRENT VITAL SIGNS:  Stable and she is afebrile.   HEART:  Deferred.   LUNGS:  Deferred.   ABDOMEN:  Deferred.     EXTREMITY EXAMINATION:  Vascular exam:  Both feet are warm to touch.  Difficulty palpating any pedal pulses.  She has chronic indurated skin, edema, stasis changes, lichenification of the skin.  Good capillary refill.   NEUROLOGICAL:   She definitely has some loss of light touch on monofilament testing.  Reflexes deferred.   MUSCULOSKELETAL EXAM:  No contractures.   DERM EXAM:  Again, lichenified trophic skin changes throughout the lower extremities with several pretibial superficial ulcerations which are clean, viable looking.  She has a 3 cm wound on the posterior aspect of the left heel which, for the most part, is pretty much a granular-type tissue, a little bit of fibrosis scattered, some hyperkeratosis along the inferior margin and undermining.  No pus.  No odor.  No bone exposure.    ASSESSMENT:    1. Chronic stasis wounds and left heel wound.  2. Sacral decubiti.  3. Medical problems as above.    PLAN:  Patient instructed to the findings of physical exam.  Heel lift boots have been ordered.  She definitely needs to have these on at all times.  Unsure as to what was being utilized before.  Went ahead and used Hydrofera today.  I will try to get ahold of some of [facility]' notes and see what they were using previously.  Has an alginate on the leg wounds, will continue with this for now.  No sacral wound exposure.  Will continue to follow.     Thank you this consultation.        ______________________________  ARIANA Butcher/ABDULLAHI  DD:  08/20/2020  Time:  08:40PM  DT:  08/20/2020  Time:  09:06PM  Job #:  [id number]

## 2022-05-14 NOTE — OP NOTE
Clark Centeno MD      Patient:   Niurka Mata            MRN: [id number]            FIN: [id number]               Age:   71 years     Sex:  Female     :  1950   Associated Diagnoses:   None   Author:   Clark Centeno MD      Surgeon: Dr. Clark Centeno MD    Assitant: RUDY Smith    Preoperative Diagnosis: Cholelithiasis    Postoperative diagnosis:  Same    Procedure: Laparoscopic cholecystectomy    Anesthesia:  GETA    EBL: 20cc    Intraoperative findings:  Chronic inflammation and distention of the gallbladder, gallbladder packed with stones    Procedure in Detail:  After informed consent was obtained the patient was brought to the operating room placed in the supine position.  General endotracheal anesthesia was administered without difficulty.  The patient's abdomen was prepped and draped in sterile fashion.  After infiltration with local anesthesia, an umbilical incision was made and a 5 mm optical trocar was used to enter the peritoneal cavity under direct vision.  Pneumoperitoneum was achieved without difficulty.  Additional 5 mm ports were placed in the epigastrium in the right upper quadrant under direct laparoscopic vision.  The patient was placed in steep reverse Trendelenburg position and tilted towards the left.  The gallbladder was identified in the right upper quadrant it showed signs of chronic inflammation and distention.  The fundus was retracted cephalad.  The infundibulum was retracted laterally.  The cystic duct was identified and dissected circumferentially using gentle blunt dissection at its confluence with the gallbladder.  The cystic artery was similarly dissected circumferentially at its entrance into the gallbladder.  Once the anatomy was clearly defined, the cystic duct was divided between clips with 2 clips placed on the stay in side.  The cystic artery was divided between clips.  The gallbladder was excised from the gallbladder fossa using hook electrocautery.   Meticulous hemostasis was achieved.  The gallbladder was decompressed and placed in an Endo Catch bag.  It was removed via the umbilical port site.  The right upper quadrant was irrigated, the irrigant was suctioned out.  The area was again inspected for hemostasis which was excellent, and bile leak and none was seen.  The umbilical port site fascia was closed with a 0 Vicryl stitch.  Ports were removed, pneumoperitoneum was relieved.  Port sites were closed with absorbable suture and sterile dressings were applied.  The patient was extubated in the operating room and brought to recovery room in stable condition.

## 2022-05-14 NOTE — H&P
Patient:   Niurka Mata            MRN: [id number]            FIN: [id number]               Age:   71 years     Sex:  Female     :  1950   Associated Diagnoses:   None   Author:   Latesha He      Health Status   The H&P was reviewed, the patient was examined, and there are no changes to the patient's condition..

## 2022-05-26 PROBLEM — J30.2 SEASONAL ALLERGIC RHINITIS: Chronic | Status: ACTIVE | Noted: 2022-05-26

## 2022-05-26 PROBLEM — K21.9 GASTROESOPHAGEAL REFLUX DISEASE: Status: ACTIVE | Noted: 2022-05-26

## 2022-05-26 PROBLEM — E66.01 CLASS 3 SEVERE OBESITY DUE TO EXCESS CALORIES WITH SERIOUS COMORBIDITY AND BODY MASS INDEX (BMI) OF 40.0 TO 44.9 IN ADULT: Chronic | Status: ACTIVE | Noted: 2022-05-26

## 2022-05-26 PROBLEM — E11.42 DIABETIC POLYNEUROPATHY ASSOCIATED WITH TYPE 2 DIABETES MELLITUS: Chronic | Status: ACTIVE | Noted: 2018-12-28

## 2022-05-26 PROBLEM — E66.9 DIABETES MELLITUS TYPE 2 IN OBESE: Status: ACTIVE | Noted: 2018-12-27

## 2022-05-26 PROBLEM — I25.10 CORONARY ARTERY DISEASE INVOLVING NATIVE CORONARY ARTERY OF NATIVE HEART WITHOUT ANGINA PECTORIS: Status: ACTIVE | Noted: 2022-05-26

## 2022-05-26 PROBLEM — E11.69 DIABETES MELLITUS TYPE 2 IN OBESE: Chronic | Status: ACTIVE | Noted: 2018-12-27

## 2022-05-26 PROBLEM — E11.42 DIABETIC POLYNEUROPATHY: Status: ACTIVE | Noted: 2022-05-26

## 2022-05-26 PROBLEM — D64.9 ANEMIA: Chronic | Status: ACTIVE | Noted: 2022-05-26

## 2022-05-26 PROBLEM — E78.2 MIXED HYPERLIPIDEMIA: Status: ACTIVE | Noted: 2022-05-26

## 2022-05-26 PROBLEM — E78.49 OTHER HYPERLIPIDEMIA: Chronic | Status: RESOLVED | Noted: 2018-12-28 | Resolved: 2022-05-26

## 2022-05-26 PROBLEM — I10 BENIGN ESSENTIAL HTN: Status: ACTIVE | Noted: 2018-12-27

## 2022-05-26 PROBLEM — E66.9 DIABETES MELLITUS TYPE 2 IN OBESE: Chronic | Status: ACTIVE | Noted: 2018-12-27

## 2022-05-26 PROBLEM — R53.81 PHYSICAL DECONDITIONING: Status: ACTIVE | Noted: 2020-05-09

## 2022-05-26 PROBLEM — E11.42 DIABETIC POLYNEUROPATHY: Chronic | Status: ACTIVE | Noted: 2022-05-26

## 2022-05-26 PROBLEM — G83.10 PARESIS OF SINGLE LOWER EXTREMITY: Status: RESOLVED | Noted: 2022-05-26 | Resolved: 2022-05-26

## 2022-05-26 PROBLEM — I73.9 PERIPHERAL ARTERIAL DISEASE: Chronic | Status: ACTIVE | Noted: 2022-05-26

## 2022-05-26 PROBLEM — H25.13 AGE-RELATED NUCLEAR CATARACT OF BOTH EYES: Status: ACTIVE | Noted: 2022-05-26

## 2022-05-26 PROBLEM — M15.4 EROSIVE OSTEOARTHRITIS: Chronic | Status: ACTIVE | Noted: 2022-05-26

## 2022-05-26 PROBLEM — K21.9 GASTROESOPHAGEAL REFLUX DISEASE: Chronic | Status: ACTIVE | Noted: 2022-05-26

## 2022-05-26 PROBLEM — E78.49 OTHER HYPERLIPIDEMIA: Chronic | Status: ACTIVE | Noted: 2018-12-28

## 2022-05-26 PROBLEM — I21.3 STEMI (ST ELEVATION MYOCARDIAL INFARCTION): Chronic | Status: ACTIVE | Noted: 2022-05-26

## 2022-05-26 PROBLEM — E11.42 DIABETIC POLYNEUROPATHY ASSOCIATED WITH TYPE 2 DIABETES MELLITUS: Status: ACTIVE | Noted: 2018-12-28

## 2022-05-26 PROBLEM — Z78.0 POSTMENOPAUSAL: Chronic | Status: ACTIVE | Noted: 2022-05-26

## 2022-05-26 PROBLEM — G83.10 PARESIS OF SINGLE LOWER EXTREMITY: Status: ACTIVE | Noted: 2022-05-26

## 2022-05-26 PROBLEM — E11.69 DIABETES MELLITUS TYPE 2 IN OBESE: Status: ACTIVE | Noted: 2018-12-27

## 2022-05-26 PROBLEM — E78.2 MIXED HYPERLIPIDEMIA: Chronic | Status: ACTIVE | Noted: 2022-05-26

## 2022-05-26 PROBLEM — S72.401A CLOSED FRACTURE OF DISTAL END OF RIGHT FEMUR: Status: ACTIVE | Noted: 2018-12-24

## 2022-05-26 PROBLEM — S72.001D: Status: ACTIVE | Noted: 2018-12-28

## 2022-05-26 PROBLEM — E66.01 CLASS 3 SEVERE OBESITY DUE TO EXCESS CALORIES WITH SERIOUS COMORBIDITY AND BODY MASS INDEX (BMI) OF 40.0 TO 44.9 IN ADULT: Status: ACTIVE | Noted: 2022-05-26

## 2022-05-26 PROBLEM — M15.4 EROSIVE OSTEOARTHRITIS: Status: ACTIVE | Noted: 2022-05-26

## 2022-05-26 PROBLEM — M81.0 OSTEOPOROSIS: Chronic | Status: ACTIVE | Noted: 2018-12-28

## 2022-05-26 PROBLEM — E78.49 OTHER HYPERLIPIDEMIA: Status: ACTIVE | Noted: 2018-12-28

## 2022-05-26 PROBLEM — D64.9 ANEMIA: Status: ACTIVE | Noted: 2022-05-26

## 2022-05-26 PROBLEM — K81.1 CHRONIC CHOLECYSTITIS: Status: ACTIVE | Noted: 2022-05-26

## 2022-05-26 PROBLEM — F17.200 TOBACCO DEPENDENCY: Status: ACTIVE | Noted: 2022-05-26

## 2022-05-26 PROBLEM — E66.01 CLASS 3 SEVERE OBESITY DUE TO EXCESS CALORIES WITH SERIOUS COMORBIDITY AND BODY MASS INDEX (BMI) OF 40.0 TO 44.9 IN ADULT: Chronic | Status: ACTIVE | Noted: 2018-12-28

## 2022-05-26 PROBLEM — I25.10 CORONARY ARTERY DISEASE INVOLVING NATIVE CORONARY ARTERY OF NATIVE HEART WITHOUT ANGINA PECTORIS: Chronic | Status: ACTIVE | Noted: 2022-05-26

## 2022-05-26 PROBLEM — I10 HYPERTENSION: Chronic | Status: ACTIVE | Noted: 2022-05-26

## 2022-05-26 PROBLEM — F17.200 TOBACCO DEPENDENCY: Chronic | Status: ACTIVE | Noted: 2022-05-26

## 2022-05-26 PROBLEM — M81.0 OSTEOPOROSIS: Status: ACTIVE | Noted: 2018-12-28

## 2022-05-26 PROBLEM — R53.81 PHYSICAL DECONDITIONING: Chronic | Status: ACTIVE | Noted: 2020-05-09

## 2022-05-26 PROBLEM — I21.3 STEMI (ST ELEVATION MYOCARDIAL INFARCTION): Status: ACTIVE | Noted: 2022-05-26

## 2022-05-26 PROBLEM — I10 HYPERTENSION: Status: ACTIVE | Noted: 2022-05-26

## 2022-05-26 PROBLEM — J30.2 SEASONAL ALLERGIC RHINITIS: Status: ACTIVE | Noted: 2022-05-26

## 2022-05-26 PROBLEM — I10 BENIGN ESSENTIAL HTN: Chronic | Status: ACTIVE | Noted: 2018-12-27

## 2022-05-26 PROBLEM — S72.401A CLOSED FRACTURE OF DISTAL END OF RIGHT FEMUR: Chronic | Status: ACTIVE | Noted: 2018-12-24

## 2022-05-26 PROBLEM — S72.001D: Chronic | Status: ACTIVE | Noted: 2018-12-28

## 2022-05-26 PROBLEM — S72.001D: Chronic | Status: RESOLVED | Noted: 2018-12-28 | Resolved: 2022-05-26

## 2022-05-26 PROBLEM — K81.1 CHRONIC CHOLECYSTITIS: Chronic | Status: ACTIVE | Noted: 2022-05-26

## 2022-05-26 PROBLEM — R79.89 LOW VITAMIN D LEVEL: Chronic | Status: ACTIVE | Noted: 2022-05-26

## 2022-05-26 PROBLEM — R79.89 LOW VITAMIN D LEVEL: Status: ACTIVE | Noted: 2022-05-26

## 2022-05-26 PROBLEM — Z78.0 POSTMENOPAUSAL: Status: ACTIVE | Noted: 2022-05-26

## 2022-05-26 PROBLEM — H25.13 AGE-RELATED NUCLEAR CATARACT OF BOTH EYES: Chronic | Status: ACTIVE | Noted: 2022-05-26

## 2022-05-26 PROBLEM — I73.9 PERIPHERAL ARTERIAL DISEASE: Status: ACTIVE | Noted: 2022-05-26

## 2022-06-07 PROBLEM — I10 HYPERTENSION: Chronic | Status: RESOLVED | Noted: 2022-05-26 | Resolved: 2022-06-07

## 2022-06-12 ENCOUNTER — HOSPITAL ENCOUNTER (INPATIENT)
Facility: HOSPITAL | Age: 72
LOS: 3 days | Discharge: HOME HEALTH | DRG: 371 | End: 2022-06-15
Attending: EMERGENCY MEDICINE | Admitting: INTERNAL MEDICINE
Payer: MEDICARE

## 2022-06-12 DIAGNOSIS — R10.13 EPIGASTRIC ABDOMINAL PAIN: ICD-10-CM

## 2022-06-12 DIAGNOSIS — N39.0 ACUTE UTI: ICD-10-CM

## 2022-06-12 DIAGNOSIS — N30.80 BACTERIAL CYSTITIS: ICD-10-CM

## 2022-06-12 DIAGNOSIS — E88.09 HYPOALBUMINEMIA: ICD-10-CM

## 2022-06-12 DIAGNOSIS — R53.1 GENERAL WEAKNESS: ICD-10-CM

## 2022-06-12 DIAGNOSIS — K86.1 ACUTE ON CHRONIC PANCREATITIS: Primary | ICD-10-CM

## 2022-06-12 DIAGNOSIS — I21.3 ST ELEVATION MYOCARDIAL INFARCTION (STEMI), UNSPECIFIED ARTERY: Chronic | ICD-10-CM

## 2022-06-12 DIAGNOSIS — K85.90 ACUTE ON CHRONIC PANCREATITIS: Primary | ICD-10-CM

## 2022-06-12 DIAGNOSIS — L89.152 SACRAL DECUBITUS ULCER, STAGE II: ICD-10-CM

## 2022-06-12 DIAGNOSIS — I21.3 STEMI (ST ELEVATION MYOCARDIAL INFARCTION): Chronic | ICD-10-CM

## 2022-06-12 DIAGNOSIS — B37.31 VULVOVAGINAL CANDIDIASIS: ICD-10-CM

## 2022-06-12 DIAGNOSIS — B37.2 INTERTRIGINOUS CANDIDIASIS: ICD-10-CM

## 2022-06-12 LAB
ALBUMIN SERPL-MCNC: 2.8 GM/DL (ref 3.4–4.8)
ALBUMIN/GLOB SERPL: 0.7 RATIO (ref 1.1–2)
ALP SERPL-CCNC: 89 UNIT/L (ref 40–150)
ALT SERPL-CCNC: 12 UNIT/L (ref 0–55)
APPEARANCE UR: CLEAR
AST SERPL-CCNC: 23 UNIT/L (ref 5–34)
BACTERIA #/AREA URNS AUTO: ABNORMAL /HPF
BASOPHILS # BLD AUTO: 0.05 X10(3)/MCL (ref 0–0.2)
BASOPHILS NFR BLD AUTO: 0.3 %
BILIRUB UR QL STRIP.AUTO: NEGATIVE MG/DL
BILIRUBIN DIRECT+TOT PNL SERPL-MCNC: 0.8 MG/DL
BUN SERPL-MCNC: 9.2 MG/DL (ref 9.8–20.1)
CALCIUM SERPL-MCNC: 8.7 MG/DL (ref 8.4–10.2)
CHLORIDE SERPL-SCNC: 104 MMOL/L (ref 98–107)
CO2 SERPL-SCNC: 18 MMOL/L (ref 23–31)
COLOR UR AUTO: YELLOW
CREAT SERPL-MCNC: 0.83 MG/DL (ref 0.55–1.02)
EOSINOPHIL # BLD AUTO: 0.01 X10(3)/MCL (ref 0–0.9)
EOSINOPHIL NFR BLD AUTO: 0.1 %
ERYTHROCYTE [DISTWIDTH] IN BLOOD BY AUTOMATED COUNT: 16.5 % (ref 11.5–17)
GLOBULIN SER-MCNC: 4 GM/DL (ref 2.4–3.5)
GLUCOSE SERPL-MCNC: 146 MG/DL (ref 82–115)
GLUCOSE UR QL STRIP.AUTO: NEGATIVE MG/DL
HCT VFR BLD AUTO: 41.1 % (ref 37–47)
HGB BLD-MCNC: 13.2 GM/DL (ref 12–16)
IMM GRANULOCYTES # BLD AUTO: 0.06 X10(3)/MCL (ref 0–0.02)
IMM GRANULOCYTES NFR BLD AUTO: 0.4 % (ref 0–0.43)
KETONES UR QL STRIP.AUTO: ABNORMAL MG/DL
LEUKOCYTE ESTERASE UR QL STRIP.AUTO: ABNORMAL UNIT/L
LIPASE SERPL-CCNC: 7 U/L
LYMPHOCYTES # BLD AUTO: 2.29 X10(3)/MCL (ref 0.6–4.6)
LYMPHOCYTES NFR BLD AUTO: 14 %
MCH RBC QN AUTO: 28.4 PG (ref 27–31)
MCHC RBC AUTO-ENTMCNC: 32.1 MG/DL (ref 33–36)
MCV RBC AUTO: 88.4 FL (ref 80–94)
MONOCYTES # BLD AUTO: 1.06 X10(3)/MCL (ref 0.1–1.3)
MONOCYTES NFR BLD AUTO: 6.5 %
NEUTROPHILS # BLD AUTO: 12.8 X10(3)/MCL (ref 2.1–9.2)
NEUTROPHILS NFR BLD AUTO: 78.7 %
NITRITE UR QL STRIP.AUTO: POSITIVE
NRBC BLD AUTO-RTO: 0 %
PH UR STRIP.AUTO: 6.5 [PH]
PLATELET # BLD AUTO: 468 X10(3)/MCL (ref 130–400)
PMV BLD AUTO: 10 FL (ref 9.4–12.4)
POCT GLUCOSE: 122 MG/DL (ref 70–110)
POTASSIUM SERPL-SCNC: 4.8 MMOL/L (ref 3.5–5.1)
PROT SERPL-MCNC: 6.8 GM/DL (ref 5.8–7.6)
PROT UR QL STRIP.AUTO: NEGATIVE MG/DL
RBC # BLD AUTO: 4.65 X10(6)/MCL (ref 4.2–5.4)
RBC #/AREA URNS AUTO: <5 /HPF
RBC UR QL AUTO: NEGATIVE UNIT/L
SARS-COV-2 RDRP RESP QL NAA+PROBE: NEGATIVE
SODIUM SERPL-SCNC: 137 MMOL/L (ref 136–145)
SP GR UR STRIP.AUTO: 1.01 (ref 1–1.03)
SQUAMOUS #/AREA URNS AUTO: <4 /LPF
TROPONIN I SERPL-MCNC: <0.01 NG/ML (ref 0–0.04)
UROBILINOGEN UR STRIP-ACNC: 0.2 MG/DL
WBC # SPEC AUTO: 16.3 X10(3)/MCL (ref 4.5–11.5)
WBC #/AREA URNS AUTO: 6 /HPF

## 2022-06-12 PROCEDURE — 87635 SARS-COV-2 COVID-19 AMP PRB: CPT | Performed by: INTERNAL MEDICINE

## 2022-06-12 PROCEDURE — 63600175 PHARM REV CODE 636 W HCPCS: Performed by: INTERNAL MEDICINE

## 2022-06-12 PROCEDURE — 93005 ELECTROCARDIOGRAM TRACING: CPT

## 2022-06-12 PROCEDURE — 81001 URINALYSIS AUTO W/SCOPE: CPT | Performed by: EMERGENCY MEDICINE

## 2022-06-12 PROCEDURE — 85025 COMPLETE CBC W/AUTO DIFF WBC: CPT | Performed by: EMERGENCY MEDICINE

## 2022-06-12 PROCEDURE — 84484 ASSAY OF TROPONIN QUANT: CPT | Performed by: EMERGENCY MEDICINE

## 2022-06-12 PROCEDURE — 96361 HYDRATE IV INFUSION ADD-ON: CPT

## 2022-06-12 PROCEDURE — 96375 TX/PRO/DX INJ NEW DRUG ADDON: CPT

## 2022-06-12 PROCEDURE — C9399 UNCLASSIFIED DRUGS OR BIOLOG: HCPCS | Performed by: INTERNAL MEDICINE

## 2022-06-12 PROCEDURE — 25000003 PHARM REV CODE 250: Performed by: INTERNAL MEDICINE

## 2022-06-12 PROCEDURE — 25000003 PHARM REV CODE 250: Performed by: EMERGENCY MEDICINE

## 2022-06-12 PROCEDURE — 36415 COLL VENOUS BLD VENIPUNCTURE: CPT | Performed by: EMERGENCY MEDICINE

## 2022-06-12 PROCEDURE — 80053 COMPREHEN METABOLIC PANEL: CPT | Performed by: EMERGENCY MEDICINE

## 2022-06-12 PROCEDURE — 96365 THER/PROPH/DIAG IV INF INIT: CPT

## 2022-06-12 PROCEDURE — 99285 EMERGENCY DEPT VISIT HI MDM: CPT | Mod: 25

## 2022-06-12 PROCEDURE — 63700000 PHARM REV CODE 250 ALT 637 W/O HCPCS: Performed by: INTERNAL MEDICINE

## 2022-06-12 PROCEDURE — 83690 ASSAY OF LIPASE: CPT | Performed by: EMERGENCY MEDICINE

## 2022-06-12 PROCEDURE — 63600175 PHARM REV CODE 636 W HCPCS: Performed by: EMERGENCY MEDICINE

## 2022-06-12 PROCEDURE — 11000001 HC ACUTE MED/SURG PRIVATE ROOM

## 2022-06-12 RX ORDER — ZOLPIDEM TARTRATE 5 MG/1
5 TABLET ORAL NIGHTLY PRN
Status: DISCONTINUED | OUTPATIENT
Start: 2022-06-12 | End: 2022-06-15 | Stop reason: HOSPADM

## 2022-06-12 RX ORDER — SODIUM CHLORIDE 0.9 % (FLUSH) 0.9 %
10 SYRINGE (ML) INJECTION EVERY 12 HOURS PRN
Status: DISCONTINUED | OUTPATIENT
Start: 2022-06-12 | End: 2022-06-15 | Stop reason: HOSPADM

## 2022-06-12 RX ORDER — MAG HYDROX/ALUMINUM HYD/SIMETH 200-200-20
30 SUSPENSION, ORAL (FINAL DOSE FORM) ORAL 4 TIMES DAILY PRN
Status: DISCONTINUED | OUTPATIENT
Start: 2022-06-12 | End: 2022-06-15 | Stop reason: HOSPADM

## 2022-06-12 RX ORDER — ATORVASTATIN CALCIUM 10 MG/1
20 TABLET, FILM COATED ORAL NIGHTLY
Status: DISCONTINUED | OUTPATIENT
Start: 2022-06-12 | End: 2022-06-15 | Stop reason: HOSPADM

## 2022-06-12 RX ORDER — MORPHINE SULFATE 4 MG/ML
4 INJECTION, SOLUTION INTRAMUSCULAR; INTRAVENOUS
Status: COMPLETED | OUTPATIENT
Start: 2022-06-12 | End: 2022-06-12

## 2022-06-12 RX ORDER — NAPROXEN SODIUM 220 MG/1
81 TABLET, FILM COATED ORAL DAILY
Status: DISCONTINUED | OUTPATIENT
Start: 2022-06-13 | End: 2022-06-15 | Stop reason: HOSPADM

## 2022-06-12 RX ORDER — ACETAMINOPHEN 325 MG/1
650 TABLET ORAL EVERY 6 HOURS PRN
Status: DISCONTINUED | OUTPATIENT
Start: 2022-06-12 | End: 2022-06-15 | Stop reason: HOSPADM

## 2022-06-12 RX ORDER — FLUCONAZOLE 100 MG/1
200 TABLET ORAL ONCE
Status: COMPLETED | OUTPATIENT
Start: 2022-06-12 | End: 2022-06-12

## 2022-06-12 RX ORDER — SODIUM CHLORIDE 9 MG/ML
INJECTION, SOLUTION INTRAVENOUS CONTINUOUS
Status: DISCONTINUED | OUTPATIENT
Start: 2022-06-12 | End: 2022-06-15 | Stop reason: HOSPADM

## 2022-06-12 RX ORDER — PROMETHAZINE HYDROCHLORIDE 25 MG/ML
25 INJECTION, SOLUTION INTRAMUSCULAR; INTRAVENOUS EVERY 4 HOURS PRN
Status: DISCONTINUED | OUTPATIENT
Start: 2022-06-12 | End: 2022-06-15 | Stop reason: HOSPADM

## 2022-06-12 RX ORDER — METFORMIN HYDROCHLORIDE 850 MG/1
850 TABLET ORAL 2 TIMES DAILY
Status: DISCONTINUED | OUTPATIENT
Start: 2022-06-12 | End: 2022-06-14

## 2022-06-12 RX ORDER — PANTOPRAZOLE SODIUM 40 MG/1
40 TABLET, DELAYED RELEASE ORAL DAILY
Status: DISCONTINUED | OUTPATIENT
Start: 2022-06-13 | End: 2022-06-15 | Stop reason: HOSPADM

## 2022-06-12 RX ORDER — CLONIDINE HYDROCHLORIDE 0.2 MG/1
0.2 TABLET ORAL
Status: DISCONTINUED | OUTPATIENT
Start: 2022-06-12 | End: 2022-06-15 | Stop reason: HOSPADM

## 2022-06-12 RX ORDER — ONDANSETRON 2 MG/ML
4 INJECTION INTRAMUSCULAR; INTRAVENOUS
Status: COMPLETED | OUTPATIENT
Start: 2022-06-12 | End: 2022-06-12

## 2022-06-12 RX ORDER — MICONAZOLE NITRATE 2 %
POWDER (GRAM) TOPICAL 2 TIMES DAILY
Status: DISCONTINUED | OUTPATIENT
Start: 2022-06-12 | End: 2022-06-15 | Stop reason: HOSPADM

## 2022-06-12 RX ORDER — ENOXAPARIN SODIUM 100 MG/ML
40 INJECTION SUBCUTANEOUS EVERY 24 HOURS
Status: DISCONTINUED | OUTPATIENT
Start: 2022-06-12 | End: 2022-06-15 | Stop reason: HOSPADM

## 2022-06-12 RX ORDER — ACETAMINOPHEN 500 MG
1000 TABLET ORAL EVERY 6 HOURS PRN
Status: DISCONTINUED | OUTPATIENT
Start: 2022-06-12 | End: 2022-06-15 | Stop reason: HOSPADM

## 2022-06-12 RX ORDER — HYDROCODONE BITARTRATE AND ACETAMINOPHEN 5; 325 MG/1; MG/1
1 TABLET ORAL EVERY 6 HOURS PRN
Status: DISCONTINUED | OUTPATIENT
Start: 2022-06-12 | End: 2022-06-15 | Stop reason: HOSPADM

## 2022-06-12 RX ORDER — ATENOLOL 50 MG/1
50 TABLET ORAL DAILY
Status: DISCONTINUED | OUTPATIENT
Start: 2022-06-13 | End: 2022-06-15 | Stop reason: HOSPADM

## 2022-06-12 RX ORDER — CLOPIDOGREL BISULFATE 75 MG/1
75 TABLET ORAL DAILY
Status: DISCONTINUED | OUTPATIENT
Start: 2022-06-13 | End: 2022-06-15 | Stop reason: HOSPADM

## 2022-06-12 RX ORDER — HYDROCODONE BITARTRATE AND ACETAMINOPHEN 10; 325 MG/1; MG/1
1 TABLET ORAL EVERY 6 HOURS PRN
Status: DISCONTINUED | OUTPATIENT
Start: 2022-06-12 | End: 2022-06-15 | Stop reason: HOSPADM

## 2022-06-12 RX ADMIN — HYDROCODONE BITARTRATE AND ACETAMINOPHEN 1 TABLET: 10; 325 TABLET ORAL at 04:06

## 2022-06-12 RX ADMIN — METFORMIN HYDROCHLORIDE 850 MG: 850 TABLET, FILM COATED ORAL at 10:06

## 2022-06-12 RX ADMIN — MICONAZOLE NITRATE 2 % TOPICAL POWDER: at 06:06

## 2022-06-12 RX ADMIN — ENOXAPARIN SODIUM 40 MG: 40 INJECTION SUBCUTANEOUS at 04:06

## 2022-06-12 RX ADMIN — MORPHINE SULFATE 4 MG: 4 INJECTION INTRAVENOUS at 11:06

## 2022-06-12 RX ADMIN — SODIUM CHLORIDE 1000 ML: 9 INJECTION, SOLUTION INTRAVENOUS at 11:06

## 2022-06-12 RX ADMIN — SODIUM CHLORIDE: 9 INJECTION, SOLUTION INTRAVENOUS at 04:06

## 2022-06-12 RX ADMIN — FLUCONAZOLE 200 MG: 100 TABLET ORAL at 05:06

## 2022-06-12 RX ADMIN — ATORVASTATIN CALCIUM 20 MG: 10 TABLET, FILM COATED ORAL at 10:06

## 2022-06-12 RX ADMIN — CEFTRIAXONE SODIUM 1 G: 1 INJECTION, POWDER, FOR SOLUTION INTRAMUSCULAR; INTRAVENOUS at 02:06

## 2022-06-12 RX ADMIN — MICONAZOLE NITRATE 2 % TOPICAL POWDER: at 10:06

## 2022-06-12 RX ADMIN — INSULIN DETEMIR 15 UNITS: 100 INJECTION, SOLUTION SUBCUTANEOUS at 10:06

## 2022-06-12 RX ADMIN — SODIUM CHLORIDE, POTASSIUM CHLORIDE, SODIUM LACTATE AND CALCIUM CHLORIDE 1000 ML: 600; 310; 30; 20 INJECTION, SOLUTION INTRAVENOUS at 01:06

## 2022-06-12 RX ADMIN — ONDANSETRON 4 MG: 2 INJECTION INTRAMUSCULAR; INTRAVENOUS at 11:06

## 2022-06-12 NOTE — H&P
[facility]  Hospital Medicine History & Physical Examination       Patient Name: Niurka Mata  MRN: [id number]  Patient Class: Emergency   Admission Date: 6/12/2022   Admitting Physician:  Service   Length of Stay: 0  Attending Physician: Roverto Olivia MD  Primary Care Provider: Tulio Leal MD  Face-to-Face encounter date: 06/12/2022  Code Status:  Full  Chief Complaint: Abdominal Pain (Pt to ER via [facility] for abd pain.  Upper abdomen.  Started 2 days ago.  Denies n/v, but is having diarrhea)        Patient information was obtained from patient, patient's family, past medical records and ER records.     HISTORY OF PRESENT ILLNESS:   Niurka Mata is a 72-year-old white female with a history past medical history of hypertension, insulin-dependent type 2 diabetes, coronary artery disease and other medical problems as outlined below who presents to the emergency room complaining of a 2 day history of generalized abdominal pain with associated diarrhea.  No relieving factors.  Symptoms exacerbated by oral intake.  Denies nausea, vomiting, fever, chills, chest pain, shortness of breath.  Patient also reports rash under breasts and sacral pressure ulcer.  Workup in the emergency room included a CT of the abdomen and pelvis without contrast which revealed a few scattered dilated loops of small bowel without an abrupt transition point which may represent an ileus ileus.  Evidence of chronic pancreatitis was also noted.  Otherwise exam was negative.  Laboratory work revealed a white blood cell count of 16,300, a bicarbonate of 18, and urinalysis positive for nitrites, leukocytes, and 6 white blood cells as well as 1+ bacteria.  Troponin I was negative.  Otherwise laboratory work was unremarkable.  Patient was given a dose of Rocephin in emergency room and very promptly admitted to the hospital medicine service.  PAST MEDICAL HISTORY:      Past Medical History:   Diagnosis Date     Age-related nuclear cataract of both eyes 5/26/2022    Anemia 5/26/2022    Benign essential HTN 12/27/2018    Chronic cholecystitis 5/26/2022    Class 3 severe obesity due to excess calories with serious comorbidity and body mass index (BMI) of 40.0 to 44.9 in adult 12/28/2018    Closed fracture of distal end of right femur 12/24/2018    Closed fracture of right hip requiring operative repair, with routine healing, subsequent encounter 12/28/2018    Coronary artery disease involving native coronary artery of native heart without angina pectoris 5/26/2022    Diabetes mellitus type 2 in obese 12/27/2018    Diabetic polyneuropathy 5/26/2022    Erosive osteoarthritis 5/26/2022    Gastroesophageal reflux disease 5/26/2022    Hypertension 5/26/2022    Low vitamin D level 5/26/2022    Mixed hyperlipidemia 5/26/2022    Osteoporosis 12/28/2018    history of multiple fractures and arthritis history of multiple fractures and arthritis    Other hyperlipidemia 12/28/2018    Peripheral arterial disease 5/26/2022    Physical deconditioning 5/9/2020    Postmenopausal 5/26/2022    Seasonal allergic rhinitis 5/26/2022    STEMI (ST elevation myocardial infarction) 5/26/2022    Tobacco dependency 5/26/2022       PAST SURGICAL HISTORY:   Right hip surgery    ALLERGIES:   Adhesive and Morphine    FAMILY HISTORY:   Reviewed and negative in regards to history of present illness    SOCIAL HISTORY:     Social History     Tobacco Use    Smoking status: Not on file    Smokeless tobacco: Not on file   Substance Use Topics    Alcohol use: Not on file        HOME MEDICATIONS:     Prior to Admission medications    Medication Sig Start Date End Date Taking? Authorizing Provider   aspirin 81 MG Chew Take 1 tablet by mouth once daily. 5/17/22 5/17/23 Yes Historical Provider   atorvastatin (LIPITOR) 20 MG tablet Take 20 mg by mouth every evening. 6/1/22  Yes Historical Provider   HYDROcodone-acetaminophen (NORCO)  mg per  tablet Take 1 tablet by mouth every 6 (six) hours as needed. 6/1/22  Yes Historical Provider   SITagliptin (JANUVIA) 100 MG Tab Take 100 mg by mouth. 2/10/22  Yes Historical Provider   traMADoL (ULTRAM) 50 mg tablet Take 50 mg by mouth every 8 (eight) hours. 4/20/22  Yes Historical Provider   atenoloL (TENORMIN) 50 MG tablet Take 50 mg by mouth once daily. 6/1/22   Historical Provider   BASAGLAR KWIKPEN U-100 INSULIN glargine 100 units/mL (3mL) SubQ pen INJECT 12 UNITS SUBCUTANEOUS ONCE A DAY (AT BEDTIME) 2/18/22   Historical Provider   gabapentin (NEURONTIN) 600 MG tablet Take 600 mg by mouth 3 (three) times daily. 6/1/22   Historical Provider   metFORMIN (GLUCOPHAGE) 850 MG tablet Take 850 mg by mouth 2 (two) times daily. 5/31/22   Historical Provider   pantoprazole (PROTONIX) 40 MG tablet Take 40 mg by mouth. 1/31/22   Historical Provider   prasugreL (EFFIENT) 10 mg Tab Take 10 mg by mouth once daily. 6/1/22   Historical Provider   temazepam (RESTORIL) 15 mg Cap Take 15 mg by mouth nightly. 6/1/22   Historical Provider   vitamin D (VITAMIN D3) 1000 units Tab Take 1,000 Units by mouth once daily. 4/20/22   Historical Provider       REVIEW OF SYSTEMS:   Except as documented, all other systems reviewed and negative     PHYSICAL EXAM:     VITAL SIGNS: 24 HRS MIN & MAX LAST   Temp  Min: 98.2 °F (36.8 °C)  Max: 98.2 °F (36.8 °C) 98.2 °F (36.8 °C)   BP  Min: 130/80  Max: 151/86 (!) 151/86   Pulse  Min: 103  Max: 110  103   Resp  Min: 18  Max: 20 18   SpO2  Min: 98 %  Max: 98 % 98 %       General:  Obese white female in no acute distress  HENT: normocephalic, atraumatic  Eye: PERRL, EOMI, clear conjunctiva  Neck: full ROM, no thyromegaly, no JVD  Respiratory: clear to auscultation bilaterally  Cardiovascular:  Tachycardic with a regular rhythm  Gastrointestinal: non-distended, decreased bowel sounds, mildly tender to palpation with no guarding or rebound tenderness  Musculoskeletal: no gross deformity  Integumentary:   Stage II sacral pressure ulcer, candidiasis beneath breasts  Neurological: cranial nerves grossly intact, no focal neurological deficit  Psychiatric: cooperative, flat affect    LABS AND IMAGING:     Recent Labs   Lab 06/12/22  1119   WBC 16.3*   RBC 4.65   HGB 13.2   HCT 41.1   MCV 88.4   MCH 28.4   MCHC 32.1*   RDW 16.5   *   MPV 10.0       Recent Labs   Lab 06/12/22  1119      K 4.8   CO2 18*   BUN 9.2*   CREATININE 0.83   CALCIUM 8.7   ALBUMIN 2.8*   ALKPHOS 89   ALT 12   AST 23   BILITOT 0.8       Microbiology Results (last 7 days)     ** No results found for the last 168 hours. **           CT Abdomen Pelvis  Without Contrast  Narrative: EXAMINATION:  CT ABDOMEN PELVIS WITHOUT CONTRAST    CLINICAL HISTORY:  Abdominal pain, acute, nonlocalized;    TECHNIQUE:  CT imaging was performed of the abdomen and pelvis without intravenous contrast. Dose length product is 560 mGycm. Automatic exposure control, adjustment of mA/kV or iterative reconstruction technique was used to limit radiation dose.    COMPARISON:  CT abdomen pelvis dated 01/12/2022    FINDINGS:  Assessment of the visceral organs and vasculature is limited by the lack of IV contrast.    Liver/biliary: No concerning hepatic findings. The gallbladder is not identified.    Pancreas: Dystrophic pancreatic calcifications are suggestive of chronic pancreatitis.    Spleen: Normal.    Adrenals: Normal.    Kidneys, ureters and bladder: There are bilateral nonobstructing renal calculi.  There is an exophytic left renal cyst.  There is no hydronephrosis.  The bladder is within normal limits.    Reproductive organs: No pelvic masses.    Stomach/bowel: There are a few scattered mildly dilated loops of small bowel without an abrupt transition point.  There is no definite focal bowel wall thickening.    Lymph nodes: No pathologically enlarged lymph node identified with noncontrast technique.    Peritoneum: No ascites or free air.    Vessels: Extensive  vascular calcifications without aneurysm.    Abdominal wall: Normal.    Lung bases: No consolidation or pleural effusion.    Bones: Chronic thoracolumbar compression deformities.  Impression: Few scattered dilated loops of small bowel without an abrupt transition point, may represent an ileus.  Otherwise no acute abnormality of the abdomen or pelvis.    Electronically signed by: Jessica Petty  Date:    06/12/2022  Time:    11:59        ASSESSMENT & PLAN:     Epigastric abdominal pain  Gastroenteritis  Possible ileus  Metabolic acidosis  Acute bacterial cystitis  Chronic pancreatitis per CT scan  Stage II sacral pressure ulcer  Vulvovaginal candidiasis  Candidiasis under breasts  Type 2 insulin-dependent diabetes mellitus  Essential hypertension  Coronary artery disease  Tobacco abuse  Morbid obesity  Diabetic polyneuropathy  Gastroesophageal reflux disease  Peripheral arterial disease  Chronic back pain with multiple chronic thoracolumbar compression fractures      Plan:  Clear liquid diet  Obtain urine culture and sensitivity  Obtain stool studies  Continue intravenous fluids, antiemetics, and pain control  Continue Rocephin for 4 more days unless urine culture is negative  Administer a dose of Diflucan  Anti fungal powder for under breasts  Consult wound care  Provide capillary blood glucose checks and insulin sliding scale  Add as needed antihypertensives  Resume appropriate home medications  Consult physical therapy and occupational therapy    VTE Prophylaxis:  Patient will be placed on Lovenox and SCD's for DVT prophylaxis and will be advised to be as mobile as possible and sit in a chair as tolerated    Patient condition:  Stable    All diagnosis and differential diagnosis have been reviewed; assessment and plan has been documented; I have personally reviewed the labs and test results that are presently available; I have reviewed the patients medication list; I have reviewed the consulting providers  response and recommendations. I have reviewed or attempted to review medical records based upon their availability.    All of the patient and family questions have been addressed and answered. Patient's is agreeable to the above stated plan. I will continue to monitor closely and make adjustments to medical management as needed.      Roverto Olivia MD   06/12/2022

## 2022-06-13 LAB
ANION GAP SERPL CALC-SCNC: 9 MEQ/L
BASOPHILS # BLD AUTO: 0.06 X10(3)/MCL (ref 0–0.2)
BASOPHILS NFR BLD AUTO: 0.5 %
BUN SERPL-MCNC: 8.1 MG/DL (ref 9.8–20.1)
CALCIUM SERPL-MCNC: 8 MG/DL (ref 8.4–10.2)
CHLORIDE SERPL-SCNC: 109 MMOL/L (ref 98–107)
CO2 SERPL-SCNC: 20 MMOL/L (ref 23–31)
CREAT SERPL-MCNC: 0.69 MG/DL (ref 0.55–1.02)
CREAT/UREA NIT SERPL: 12
EOSINOPHIL # BLD AUTO: 0.08 X10(3)/MCL (ref 0–0.9)
EOSINOPHIL NFR BLD AUTO: 0.6 %
ERYTHROCYTE [DISTWIDTH] IN BLOOD BY AUTOMATED COUNT: 16.4 % (ref 11.5–17)
EST. AVERAGE GLUCOSE BLD GHB EST-MCNC: 162.8 MG/DL
GLUCOSE SERPL-MCNC: 123 MG/DL (ref 82–115)
GLUCOSE SERPL-MCNC: 89 MG/DL (ref 70–110)
HBA1C MFR BLD: 7.3 %
HCT VFR BLD AUTO: 38.2 % (ref 37–47)
HGB BLD-MCNC: 12.1 GM/DL (ref 12–16)
IMM GRANULOCYTES # BLD AUTO: 0.07 X10(3)/MCL (ref 0–0.02)
IMM GRANULOCYTES NFR BLD AUTO: 0.6 % (ref 0–0.43)
LYMPHOCYTES # BLD AUTO: 2.26 X10(3)/MCL (ref 0.6–4.6)
LYMPHOCYTES NFR BLD AUTO: 18.1 %
MCH RBC QN AUTO: 28.7 PG (ref 27–31)
MCHC RBC AUTO-ENTMCNC: 31.7 MG/DL (ref 33–36)
MCV RBC AUTO: 90.5 FL (ref 80–94)
MONOCYTES # BLD AUTO: 0.89 X10(3)/MCL (ref 0.1–1.3)
MONOCYTES NFR BLD AUTO: 7.1 %
NEUTROPHILS # BLD AUTO: 9.2 X10(3)/MCL (ref 2.1–9.2)
NEUTROPHILS NFR BLD AUTO: 73.1 %
NRBC BLD AUTO-RTO: 0 %
PLATELET # BLD AUTO: 330 X10(3)/MCL (ref 130–400)
PMV BLD AUTO: 9.3 FL (ref 9.4–12.4)
POCT GLUCOSE: 114 MG/DL (ref 70–110)
POCT GLUCOSE: 116 MG/DL (ref 70–110)
POCT GLUCOSE: 84 MG/DL (ref 70–110)
POTASSIUM SERPL-SCNC: 3.8 MMOL/L (ref 3.5–5.1)
RBC # BLD AUTO: 4.22 X10(6)/MCL (ref 4.2–5.4)
SODIUM SERPL-SCNC: 138 MMOL/L (ref 136–145)
WBC # SPEC AUTO: 12.5 X10(3)/MCL (ref 4.5–11.5)

## 2022-06-13 PROCEDURE — 94761 N-INVAS EAR/PLS OXIMETRY MLT: CPT

## 2022-06-13 PROCEDURE — 36415 COLL VENOUS BLD VENIPUNCTURE: CPT | Performed by: INTERNAL MEDICINE

## 2022-06-13 PROCEDURE — 25000003 PHARM REV CODE 250: Performed by: INTERNAL MEDICINE

## 2022-06-13 PROCEDURE — 63600175 PHARM REV CODE 636 W HCPCS: Performed by: INTERNAL MEDICINE

## 2022-06-13 PROCEDURE — 11000001 HC ACUTE MED/SURG PRIVATE ROOM

## 2022-06-13 PROCEDURE — 80048 BASIC METABOLIC PNL TOTAL CA: CPT | Performed by: INTERNAL MEDICINE

## 2022-06-13 PROCEDURE — 83036 HEMOGLOBIN GLYCOSYLATED A1C: CPT | Performed by: INTERNAL MEDICINE

## 2022-06-13 PROCEDURE — 97162 PT EVAL MOD COMPLEX 30 MIN: CPT

## 2022-06-13 PROCEDURE — 97166 OT EVAL MOD COMPLEX 45 MIN: CPT

## 2022-06-13 PROCEDURE — 85025 COMPLETE CBC W/AUTO DIFF WBC: CPT | Performed by: INTERNAL MEDICINE

## 2022-06-13 RX ORDER — PRASUGREL 10 MG/1
10 TABLET, FILM COATED ORAL DAILY
Status: DISCONTINUED | OUTPATIENT
Start: 2022-06-13 | End: 2022-06-13

## 2022-06-13 RX ORDER — NYSTATIN AND TRIAMCINOLONE ACETONIDE 100000; 1 [USP'U]/G; MG/G
CREAM TOPICAL 3 TIMES DAILY
Status: DISCONTINUED | OUTPATIENT
Start: 2022-06-13 | End: 2022-06-15 | Stop reason: HOSPADM

## 2022-06-13 RX ORDER — IBUPROFEN 200 MG
24 TABLET ORAL
Status: DISCONTINUED | OUTPATIENT
Start: 2022-06-13 | End: 2022-06-15 | Stop reason: HOSPADM

## 2022-06-13 RX ORDER — NALOXONE HCL 0.4 MG/ML
0.02 VIAL (ML) INJECTION
Status: DISCONTINUED | OUTPATIENT
Start: 2022-06-13 | End: 2022-06-15 | Stop reason: HOSPADM

## 2022-06-13 RX ORDER — ONDANSETRON 2 MG/ML
4 INJECTION INTRAMUSCULAR; INTRAVENOUS EVERY 4 HOURS PRN
Status: DISCONTINUED | OUTPATIENT
Start: 2022-06-13 | End: 2022-06-15 | Stop reason: HOSPADM

## 2022-06-13 RX ORDER — GABAPENTIN 300 MG/1
600 CAPSULE ORAL 3 TIMES DAILY
Status: DISCONTINUED | OUTPATIENT
Start: 2022-06-13 | End: 2022-06-14

## 2022-06-13 RX ORDER — INSULIN ASPART 100 [IU]/ML
1-10 INJECTION, SOLUTION INTRAVENOUS; SUBCUTANEOUS
Status: DISCONTINUED | OUTPATIENT
Start: 2022-06-13 | End: 2022-06-15 | Stop reason: HOSPADM

## 2022-06-13 RX ORDER — GLUCAGON 1 MG
1 KIT INJECTION
Status: DISCONTINUED | OUTPATIENT
Start: 2022-06-13 | End: 2022-06-15 | Stop reason: HOSPADM

## 2022-06-13 RX ORDER — IBUPROFEN 200 MG
16 TABLET ORAL
Status: DISCONTINUED | OUTPATIENT
Start: 2022-06-13 | End: 2022-06-15 | Stop reason: HOSPADM

## 2022-06-13 RX ORDER — DIPHENHYDRAMINE HCL 25 MG
25 CAPSULE ORAL EVERY 6 HOURS PRN
Status: DISCONTINUED | OUTPATIENT
Start: 2022-06-13 | End: 2022-06-15 | Stop reason: HOSPADM

## 2022-06-13 RX ADMIN — DIPHENHYDRAMINE HYDROCHLORIDE 25 MG: 25 CAPSULE ORAL at 01:06

## 2022-06-13 RX ADMIN — METFORMIN HYDROCHLORIDE 850 MG: 850 TABLET, FILM COATED ORAL at 09:06

## 2022-06-13 RX ADMIN — SODIUM CHLORIDE: 9 INJECTION, SOLUTION INTRAVENOUS at 12:06

## 2022-06-13 RX ADMIN — METFORMIN HYDROCHLORIDE 850 MG: 850 TABLET, FILM COATED ORAL at 10:06

## 2022-06-13 RX ADMIN — HYDROCODONE BITARTRATE AND ACETAMINOPHEN 1 TABLET: 10; 325 TABLET ORAL at 07:06

## 2022-06-13 RX ADMIN — HYDROCODONE BITARTRATE AND ACETAMINOPHEN 1 TABLET: 10; 325 TABLET ORAL at 01:06

## 2022-06-13 RX ADMIN — NYSTATIN AND TRIAMCINOLONE ACETONIDE: 100000; 1 CREAM TOPICAL at 09:06

## 2022-06-13 RX ADMIN — CLOPIDOGREL 75 MG: 75 TABLET, FILM COATED ORAL at 10:06

## 2022-06-13 RX ADMIN — GABAPENTIN 600 MG: 300 CAPSULE ORAL at 10:06

## 2022-06-13 RX ADMIN — ENOXAPARIN SODIUM 40 MG: 40 INJECTION SUBCUTANEOUS at 06:06

## 2022-06-13 RX ADMIN — SITAGLIPTIN 100 MG: 100 TABLET, FILM COATED ORAL at 10:06

## 2022-06-13 RX ADMIN — CEFTRIAXONE SODIUM 1 G: 1 INJECTION, POWDER, FOR SOLUTION INTRAMUSCULAR; INTRAVENOUS at 09:06

## 2022-06-13 RX ADMIN — COLLAGENASE SANTYL: 250 OINTMENT TOPICAL at 09:06

## 2022-06-13 RX ADMIN — GABAPENTIN 600 MG: 300 CAPSULE ORAL at 02:06

## 2022-06-13 RX ADMIN — MICONAZOLE NITRATE 2 % TOPICAL POWDER: at 09:06

## 2022-06-13 RX ADMIN — HYDROCODONE BITARTRATE AND ACETAMINOPHEN 1 TABLET: 10; 325 TABLET ORAL at 02:06

## 2022-06-13 RX ADMIN — ASPIRIN 81 MG CHEWABLE TABLET 81 MG: 81 TABLET CHEWABLE at 10:06

## 2022-06-13 RX ADMIN — NYSTATIN AND TRIAMCINOLONE ACETONIDE: 100000; 1 CREAM TOPICAL at 03:06

## 2022-06-13 RX ADMIN — DIPHENHYDRAMINE HYDROCHLORIDE 25 MG: 25 CAPSULE ORAL at 09:06

## 2022-06-13 RX ADMIN — ATORVASTATIN CALCIUM 20 MG: 10 TABLET, FILM COATED ORAL at 09:06

## 2022-06-13 RX ADMIN — ATENOLOL 50 MG: 50 TABLET ORAL at 10:06

## 2022-06-13 RX ADMIN — HYDROCODONE BITARTRATE AND ACETAMINOPHEN 1 TABLET: 10; 325 TABLET ORAL at 09:06

## 2022-06-13 RX ADMIN — PANTOPRAZOLE SODIUM 40 MG: 40 TABLET, DELAYED RELEASE ORAL at 10:06

## 2022-06-13 RX ADMIN — GABAPENTIN 600 MG: 300 CAPSULE ORAL at 09:06

## 2022-06-13 NOTE — PROGRESS NOTES
[facility] Medicine Progress Note        Chief Complaint: Inpatient follow-up on gastroenteritis and failure to thrive    HPI:     Interval Hx:   Patient was still in ED 14 when I evaluated her.  There is a foul odor in the room presumed due to Candida.  She has not touched her liquid breakfast nor her liquid lunch.  She has for the most part non-conversant and hard to obtain a history from.  When I asked her about diarrhea she said that she had some this week in.  When asked her if she had any vomiting she answered appropriately and said no.  When asked if she had any abdominal pain she may have a nonspecific answer.  Regardless she appears stable and is about to be transported to the 4th floor.  She is hemodynamically stable, afebrile, and on room air.  Stool studies have not been collected.    Objective/physical exam:  General:   elderly white female in no acute distress  HENT: normocephalic, atraumatic  Eye: PERRL, EOMI, clear conjunctiva  Neck: full ROM, no thyromegaly, no JVD  Respiratory: clear to auscultation bilaterally  Cardiovascular:   regular rate and rhythm  Gastrointestinal: non-distended, decreased bowel sounds, mildly tender to palpation with no guarding or rebound tenderness  Musculoskeletal: no gross deformity  Integumentary:  Stage II sacral pressure ulcer, severe candidiasis beneath breasts, tattoos on the arms  Neurological: cranial nerves grossly intact, no focal neurological deficit  Psychiatric: cooperative, flat affect       VITAL SIGNS: 24 HRS MIN & MAX LAST   Temp  Min: 98.4 °F (36.9 °C)  Max: 98.4 °F (36.9 °C) 98.4 °F (36.9 °C)   BP  Min: 125/67  Max: 152/76 125/67   Pulse  Min: 69  Max: 107  107   Resp  Min: 6  Max: 20 (!) 6   SpO2  Min: 95 %  Max: 100 % 98 %       Recent Labs   Lab 06/12/22  1119 06/13/22  0347   WBC 16.3* 12.5*   RBC 4.65 4.22   HGB 13.2 12.1   HCT 41.1 38.2   MCV 88.4 90.5   MCH 28.4 28.7   MCHC 32.1* 31.7*   RDW 16.5 16.4    * 330   MPV 10.0 9.3*       Recent Labs   Lab 06/12/22  1119 06/13/22  0347    138   K 4.8 3.8   CO2 18* 20*   BUN 9.2* 8.1*   CREATININE 0.83 0.69   CALCIUM 8.7 8.0*   ALBUMIN 2.8*  --    ALKPHOS 89  --    ALT 12  --    AST 23  --    BILITOT 0.8  --           Microbiology Results (last 7 days)     Procedure Component Value Units Date/Time    Stool Culture [[id number]]     Order Status: Sent Specimen: Stool            See below for Radiology    Scheduled Med:   aspirin  81 mg Oral Daily    atenoloL  50 mg Oral Daily    atorvastatin  20 mg Oral QHS    cefTRIAXone (ROCEPHIN) IVPB  1 g Intravenous Q24H    clopidogreL  75 mg Oral Daily    enoxaparin  40 mg Subcutaneous Daily    gabapentin  600 mg Oral TID    insulin detemir U-100  15 Units Subcutaneous QHS    metFORMIN  850 mg Oral BID    miconazole NITRATE 2 %   Topical (Top) BID    pantoprazole  40 mg Oral Daily    SITagliptin  100 mg Oral Daily        Continuous Infusions:   sodium chloride 0.9% 100 mL/hr at 06/12/22 1641        PRN Meds:  acetaminophen, acetaminophen, aluminum-magnesium hydroxide-simethicone, cloNIDine, dextrose 10%, dextrose 10%, glucagon (human recombinant), glucose, glucose, HYDROcodone-acetaminophen, HYDROcodone-acetaminophen, insulin aspart U-100, naloxone, ondansetron, promethazine, sodium chloride 0.9%, zolpidem       Assessment/Plan:  Failure to thrive  Gastroenteritis  Possible ileus  Metabolic acidosis, improved  Possible acute bacterial cystitis though culture is thus far negative  Chronic pancreatitis per CT scan  Stage II sacral pressure ulcer  Vulvovaginal candidiasis  Candidiasis under breasts  Type 2 insulin-dependent diabetes mellitus  Essential hypertension  Coronary artery disease  Tobacco abuse  Morbid obesity  Diabetic polyneuropathy  Gastroesophageal reflux disease  Peripheral arterial disease  Chronic back pain with multiple chronic thoracolumbar compression fractures  Possible dementia or  psychosis      Plan:  Clear liquid diet and advanced as tolerated  Follow-up on urine culture and sensitivity which is thus far negative  Obtain stool studies  Continue intravenous fluids, antiemetics, and pain control  Continue Rocephin for 3 more days unless final urine culture is negative  Administered a dose of Diflucan yesterday  Ordered anti fungal powder for under breasts but patient refuses it  Consulted wound care  Continue capillary blood glucose checks and insulin sliding scale  Continue as needed antihypertensives  Resumed appropriate home medications  Consulted physical therapy and occupational therapy     VTE Prophylaxis:  Patient will be placed on Lovenox and SCD's for DVT prophylaxis     Patient condition:  Stable    Anticipated discharge and Disposition:  May require skilled nursing facility placement    All diagnosis and differential diagnosis have been reviewed; assessment and plan has been documented; I have personally reviewed the labs and test results that are presently available; I have reviewed the patients medication list; I have reviewed the consulting providers response and recommendations. I have reviewed or attempted to review medical records based upon their availability    All of the patient's questions have been  addressed and answered. Patient's is agreeable to the above stated plan. I will continue to monitor closely and make adjustments to medical management as needed.  _____________________________________________________________________    Nutrition Status:    Radiology:  CT Abdomen Pelvis  Without Contrast  Narrative: EXAMINATION:  CT ABDOMEN PELVIS WITHOUT CONTRAST    CLINICAL HISTORY:  Abdominal pain, acute, nonlocalized;    TECHNIQUE:  CT imaging was performed of the abdomen and pelvis without intravenous contrast. Dose length product is 560 mGycm. Automatic exposure control, adjustment of mA/kV or iterative reconstruction technique was used to limit radiation  dose.    COMPARISON:  CT abdomen pelvis dated 01/12/2022    FINDINGS:  Assessment of the visceral organs and vasculature is limited by the lack of IV contrast.    Liver/biliary: No concerning hepatic findings. The gallbladder is not identified.    Pancreas: Dystrophic pancreatic calcifications are suggestive of chronic pancreatitis.    Spleen: Normal.    Adrenals: Normal.    Kidneys, ureters and bladder: There are bilateral nonobstructing renal calculi.  There is an exophytic left renal cyst.  There is no hydronephrosis.  The bladder is within normal limits.    Reproductive organs: No pelvic masses.    Stomach/bowel: There are a few scattered mildly dilated loops of small bowel without an abrupt transition point.  There is no definite focal bowel wall thickening.    Lymph nodes: No pathologically enlarged lymph node identified with noncontrast technique.    Peritoneum: No ascites or free air.    Vessels: Extensive vascular calcifications without aneurysm.    Abdominal wall: Normal.    Lung bases: No consolidation or pleural effusion.    Bones: Chronic thoracolumbar compression deformities.  Impression: Few scattered dilated loops of small bowel without an abrupt transition point, may represent an ileus.  Otherwise no acute abnormality of the abdomen or pelvis.    Electronically signed by: Jessica Petty  Date:    06/12/2022  Time:    11:59      Roverto Olivia MD   06/13/2022

## 2022-06-13 NOTE — ED NOTES
Assumed care of pt at this time. Report received from BALTAZAR Bruno. Pt currently resting quietly on stretcher in NAD. Resp e/u.

## 2022-06-13 NOTE — PLAN OF CARE
Problem: Physical Therapy  Goal: Physical Therapy Goal  Description: Goals to be met by: 22     Patient will increase functional independence with mobility by performin. Supine to sit with Stand-by Assistance  2. Sit to supine with Stand-by Assistance  3. Sit to stand transfer with Stand-by Assistance  4. Bed to chair transfer with Stand-by Assistance using Rolling Walker  5. Gait  x 50 feet with Contact Guard Assistance using Rolling Walker.   6. Increased functional strength to 4/5 in BLE    Outcome: Ongoing, Progressing

## 2022-06-13 NOTE — PT/OT/SLP EVAL
Occupational Therapy   Evaluation    Name: Niurka Mata  MRN: [id number]  Admitting Diagnosis:  Bacterial cystitis  Recent Surgery: * No surgery found *      Recommendations:     Discharge Recommendations: nursing facility, skilled  Discharge Equipment Recommendations:  none  Barriers to discharge:  Decreased caregiver support    Assessment:     Niurka Mata is a 72 y.o. female with a medical diagnosis of Bacterial cystitis. Performance deficits affecting function: impaired endurance, weakness, impaired sensation, impaired self care skills, impaired functional mobilty, impaired balance, pain.      Rehab Prognosis: Good; patient would benefit from acute skilled OT services to address these deficits and reach maximum level of function.       Plan:     Patient to be seen 3 x/week, 2 x/week to address the above listed problems via self-care/home management, therapeutic activities, therapeutic exercises  · Plan of Care Expires:    · Plan of Care Reviewed with: patient    Subjective     Chief Complaint: Tired   Patient/Family Comments/goals: Get back home and be indep    Occupational Profile:  Living Environment: alone  Previous level of function: MI with ADLs and using a w/c for functional mobility. Pt reports using RW to tf   Equipment Used at Home:  walker, rolling, wheelchair, dressing device  Assistance upon Discharge: None     Pain/Comfort:  · Pain Rating 1: 0/10    Patients cultural, spiritual, Adventism conflicts given the current situation: no    Objective:     Communicated with: RN prior to session.  Patient found supine upon OT entry to room.    General Precautions: Standard,     Respiratory Status: Room air    Occupational Performance:    Bed Mobility:    · Patient completed Supine to Sit with minimum assistance  · Patient completed Sit to Supine with minimum assistance    Functional Mobility/Transfers:  · Patient completed Sit <> Stand Transfer with minimum assistance  with  rolling walker      Activities of Daily Living:  · Grooming: stand by assistance    · Upper Body Dressing: minimum assistance    · Lower Body Dressing: moderate assistance    · Toileting: maximal assistance      Physical Exam:  Upper Extremity Range of Motion:     -       Right Upper Extremity: WNL  -       Left Upper Extremity: WNL  Upper Extremity Strength:    -       Right Upper Extremity: 4-/5  -       Left Upper Extremity: 4-/5    Patient left supine with all lines intact, call button in reach and pillow placed on R side to offload pressure on sacrum    GOALS:   Multidisciplinary Problems     Occupational Therapy Goals        Problem: Occupational Therapy    Goal Priority Disciplines Outcome Interventions   Occupational Therapy Goal     OT, PT/OT     Description: MI for grooming routine sitting in w/c  MI for LB dressing  MI for toilet t/f with RW                   History:     Past Medical History:   Diagnosis Date    Age-related nuclear cataract of both eyes 5/26/2022    Anemia 5/26/2022    Benign essential HTN 12/27/2018    Chronic cholecystitis 5/26/2022    Class 3 severe obesity due to excess calories with serious comorbidity and body mass index (BMI) of 40.0 to 44.9 in adult 12/28/2018    Closed fracture of distal end of right femur 12/24/2018    Closed fracture of right hip requiring operative repair, with routine healing, subsequent encounter 12/28/2018    Coronary artery disease involving native coronary artery of native heart without angina pectoris 5/26/2022    Diabetes mellitus type 2 in obese 12/27/2018    Diabetic polyneuropathy 5/26/2022    Erosive osteoarthritis 5/26/2022    Gastroesophageal reflux disease 5/26/2022    Hypertension 5/26/2022    Low vitamin D level 5/26/2022    Mixed hyperlipidemia 5/26/2022    Osteoporosis 12/28/2018    history of multiple fractures and arthritis history of multiple fractures and arthritis    Other hyperlipidemia 12/28/2018    Peripheral arterial disease  5/26/2022    Physical deconditioning 5/9/2020    Postmenopausal 5/26/2022    Seasonal allergic rhinitis 5/26/2022    STEMI (ST elevation myocardial infarction) 5/26/2022    Tobacco dependency 5/26/2022       No past surgical history on file.    Time Tracking:     OT Date of Treatment: 06/13/22  OT Start Time: 1130  OT Stop Time: 1145  OT Total Time (min): 15 min    Billable Minutes:Evaluation 15 minutes     6/13/2022

## 2022-06-13 NOTE — NURSING
Discussed skin integrity status and plan of care with assigned nurse Nia , recommended saline moist gauze under dry gauze to heel wound, will follow up in am , anticpated placement on a low air loss bed.

## 2022-06-13 NOTE — PT/OT/SLP EVAL
Physical Therapy Evaluation    Patient Name:  Niurka Mata   MRN:  [id number]    Recommendations:     Discharge Recommendations:  nursing facility, skilled, home with home health   Discharge Equipment Recommendations:  (TBD)   Barriers to discharge: Decreased caregiver support    Assessment:     Niurka Mata is a 72 y.o. female admitted with a medical diagnosis of Bacterial cystitis. Patient with sacral ulcer. Patient had a MI ~6 weeks ago and spent time in rehab. She was discharged to her apartment last week. She reports spending majority of her time in WC. She is able to transfer independently. Per patient, has not walked in years.  She presents with the following impairments/functional limitations:  weakness, impaired endurance, impaired functional mobilty, gait instability, impaired balance, decreased safety awareness, pain. She required MIN A for bed mobility. Stood and took 4 side-steps with RW & CGA. I do feel that patient would benefit additional rehab vs SNF, however, she will most likely refuse placement. At the very least, she will need HH PT and a bedside commode. Progress patient as tolerated.     Rehab Prognosis: Fair; patient would benefit from acute skilled PT services to address these deficits and reach maximum level of function.    Recent Surgery: * No surgery found *      Plan:     During this hospitalization, patient to be seen 5 x/week to address the identified rehab impairments via gait training, therapeutic activities, therapeutic exercises, neuromuscular re-education and progress toward the following goals:    · Plan of Care Expires:  07/13/22    Subjective     Chief Complaint: none  Patient/Family Comments/goals: none  Pain/Comfort:  · Pain Rating 1: 5/10  · Location - Side 1: Right  · Location 1: leg  · Pain Addressed 1: Distraction, Reposition  · Pain Rating Post-Intervention 1: 5/10    Patients cultural, spiritual, Orthodox conflicts given the current situation: no    Living  Environment:  1st-floor apartment   Equipment used at home: walker, rolling, wheelchair.  DME owned (not currently used): none.  Upon discharge, patient will have assistance from TBD.    Objective:     Communicated with nurse prior to session.  Patient found supine with telemetry  upon PT entry to room.    General Precautions: Standard, fall   Orthopedic Precautions:N/A   Braces: N/A  Respiratory Status: Room air    Exams:  · Cognitive Exam:  Patient is oriented to Person, Place, Time and Situation  · Sensation:    · -       Intact  · RLE ROM: WFL  · RLE Strength: -4/5 grossly  · LLE ROM: WFL  · LLE Strength: -4/5 grossly    Functional Mobility:  · Bed Mobility:     · Rolling Left:  stand by assistance  · Rolling Right: minimum assistance  · Supine to Sit: minimum assistance  · Sit to Supine: minimum assistance  · Transfers:     · Sit to Stand:  minimum assistance with rolling walker  · Gait: 4 side-steps right with RW & CGA.  · Balance: fair    AM-PAC 6 CLICK MOBILITY  Total Score:16     Patient left supine with all lines intact, call button in reach and pillow under left side.    GOALS:   Multidisciplinary Problems     Physical Therapy Goals        Problem: Physical Therapy    Goal Priority Disciplines Outcome Goal Variances Interventions   Physical Therapy Goal     PT, PT/OT Ongoing, Progressing     Description: Goals to be met by: 22     Patient will increase functional independence with mobility by performin. Supine to sit with Stand-by Assistance  2. Sit to supine with Stand-by Assistance  3. Sit to stand transfer with Stand-by Assistance  4. Bed to chair transfer with Stand-by Assistance using Rolling Walker  5. Gait  x 50 feet with Contact Guard Assistance using Rolling Walker.   6. Increased functional strength to 4/5 in BLE                     History:     Past Medical History:   Diagnosis Date    Age-related nuclear cataract of both eyes 2022    Anemia 2022    Benign essential HTN  12/27/2018    Chronic cholecystitis 5/26/2022    Class 3 severe obesity due to excess calories with serious comorbidity and body mass index (BMI) of 40.0 to 44.9 in adult 12/28/2018    Closed fracture of distal end of right femur 12/24/2018    Closed fracture of right hip requiring operative repair, with routine healing, subsequent encounter 12/28/2018    Coronary artery disease involving native coronary artery of native heart without angina pectoris 5/26/2022    Diabetes mellitus type 2 in obese 12/27/2018    Diabetic polyneuropathy 5/26/2022    Erosive osteoarthritis 5/26/2022    Gastroesophageal reflux disease 5/26/2022    Hypertension 5/26/2022    Low vitamin D level 5/26/2022    Mixed hyperlipidemia 5/26/2022    Osteoporosis 12/28/2018    history of multiple fractures and arthritis history of multiple fractures and arthritis    Other hyperlipidemia 12/28/2018    Peripheral arterial disease 5/26/2022    Physical deconditioning 5/9/2020    Postmenopausal 5/26/2022    Seasonal allergic rhinitis 5/26/2022    STEMI (ST elevation myocardial infarction) 5/26/2022    Tobacco dependency 5/26/2022       No past surgical history on file.    Time Tracking:     PT Received On: 06/13/22  PT Start Time: 1205     PT Stop Time: 1225  PT Total Time (min): 20 min     Billable Minutes: Moderate Complexity Evaluation 20 minutes      06/13/2022

## 2022-06-14 LAB
CLOSTRIDIUM DIFFICILE GDH ANTIGEN (OHS): POSITIVE
CLOSTRIDIUM DIFFICILE TOXIN A/B (OHS): NEGATIVE
FECAL LEUKOCYTE (OHS): POSITIVE
LEUKO NEG CONT (OHS): NEGATIVE
LEUKO POS CONT (OHS): POSITIVE

## 2022-06-14 PROCEDURE — 63600175 PHARM REV CODE 636 W HCPCS: Performed by: INTERNAL MEDICINE

## 2022-06-14 PROCEDURE — S0030 INJECTION, METRONIDAZOLE: HCPCS | Performed by: INTERNAL MEDICINE

## 2022-06-14 PROCEDURE — 97530 THERAPEUTIC ACTIVITIES: CPT

## 2022-06-14 PROCEDURE — 27000207 HC ISOLATION

## 2022-06-14 PROCEDURE — 25000003 PHARM REV CODE 250: Performed by: INTERNAL MEDICINE

## 2022-06-14 PROCEDURE — 97530 THERAPEUTIC ACTIVITIES: CPT | Mod: CQ

## 2022-06-14 PROCEDURE — 97535 SELF CARE MNGMENT TRAINING: CPT

## 2022-06-14 PROCEDURE — 86318 IA INFECTIOUS AGENT ANTIBODY: CPT | Performed by: INTERNAL MEDICINE

## 2022-06-14 PROCEDURE — 11000001 HC ACUTE MED/SURG PRIVATE ROOM

## 2022-06-14 PROCEDURE — 87493 C DIFF AMPLIFIED PROBE: CPT | Performed by: INTERNAL MEDICINE

## 2022-06-14 PROCEDURE — 87045 FECES CULTURE AEROBIC BACT: CPT | Performed by: INTERNAL MEDICINE

## 2022-06-14 PROCEDURE — 86403 PARTICLE AGGLUT ANTBDY SCRN: CPT | Performed by: INTERNAL MEDICINE

## 2022-06-14 RX ORDER — VANCOMYCIN HYDROCHLORIDE 125 MG/1
125 CAPSULE ORAL EVERY 6 HOURS
Status: DISCONTINUED | OUTPATIENT
Start: 2022-06-14 | End: 2022-06-15 | Stop reason: HOSPADM

## 2022-06-14 RX ORDER — CIPROFLOXACIN 2 MG/ML
400 INJECTION, SOLUTION INTRAVENOUS
Status: DISCONTINUED | OUTPATIENT
Start: 2022-06-14 | End: 2022-06-15 | Stop reason: HOSPADM

## 2022-06-14 RX ORDER — METRONIDAZOLE 500 MG/100ML
500 INJECTION, SOLUTION INTRAVENOUS
Status: DISCONTINUED | OUTPATIENT
Start: 2022-06-14 | End: 2022-06-15 | Stop reason: HOSPADM

## 2022-06-14 RX ORDER — GABAPENTIN 300 MG/1
600 CAPSULE ORAL 2 TIMES DAILY
Status: DISCONTINUED | OUTPATIENT
Start: 2022-06-14 | End: 2022-06-15 | Stop reason: HOSPADM

## 2022-06-14 RX ADMIN — CIPROFLOXACIN 400 MG: 2 INJECTION, SOLUTION INTRAVENOUS at 07:06

## 2022-06-14 RX ADMIN — GABAPENTIN 600 MG: 300 CAPSULE ORAL at 08:06

## 2022-06-14 RX ADMIN — HYDROCODONE BITARTRATE AND ACETAMINOPHEN 1 TABLET: 10; 325 TABLET ORAL at 03:06

## 2022-06-14 RX ADMIN — COLLAGENASE SANTYL: 250 OINTMENT TOPICAL at 08:06

## 2022-06-14 RX ADMIN — ENOXAPARIN SODIUM 40 MG: 40 INJECTION SUBCUTANEOUS at 05:06

## 2022-06-14 RX ADMIN — MICONAZOLE NITRATE 2 % TOPICAL POWDER: at 08:06

## 2022-06-14 RX ADMIN — HYDROCODONE BITARTRATE AND ACETAMINOPHEN 1 TABLET: 10; 325 TABLET ORAL at 09:06

## 2022-06-14 RX ADMIN — PANTOPRAZOLE SODIUM 40 MG: 40 TABLET, DELAYED RELEASE ORAL at 08:06

## 2022-06-14 RX ADMIN — VANCOMYCIN HYDROCHLORIDE 125 MG: 125 CAPSULE ORAL at 07:06

## 2022-06-14 RX ADMIN — ASPIRIN 81 MG CHEWABLE TABLET 81 MG: 81 TABLET CHEWABLE at 08:06

## 2022-06-14 RX ADMIN — SITAGLIPTIN 100 MG: 100 TABLET, FILM COATED ORAL at 08:06

## 2022-06-14 RX ADMIN — ATORVASTATIN CALCIUM 20 MG: 10 TABLET, FILM COATED ORAL at 09:06

## 2022-06-14 RX ADMIN — METFORMIN HYDROCHLORIDE 850 MG: 850 TABLET, FILM COATED ORAL at 08:06

## 2022-06-14 RX ADMIN — NYSTATIN AND TRIAMCINOLONE ACETONIDE: 100000; 1 CREAM TOPICAL at 03:06

## 2022-06-14 RX ADMIN — COLLAGENASE SANTYL: 250 OINTMENT TOPICAL at 09:06

## 2022-06-14 RX ADMIN — NYSTATIN AND TRIAMCINOLONE ACETONIDE: 100000; 1 CREAM TOPICAL at 09:06

## 2022-06-14 RX ADMIN — DIPHENHYDRAMINE HYDROCHLORIDE 25 MG: 25 CAPSULE ORAL at 09:06

## 2022-06-14 RX ADMIN — MICONAZOLE NITRATE 2 % TOPICAL POWDER: at 09:06

## 2022-06-14 RX ADMIN — GABAPENTIN 600 MG: 300 CAPSULE ORAL at 09:06

## 2022-06-14 RX ADMIN — CLOPIDOGREL 75 MG: 75 TABLET, FILM COATED ORAL at 08:06

## 2022-06-14 RX ADMIN — NYSTATIN AND TRIAMCINOLONE ACETONIDE: 100000; 1 CREAM TOPICAL at 08:06

## 2022-06-14 RX ADMIN — ATENOLOL 50 MG: 50 TABLET ORAL at 08:06

## 2022-06-14 RX ADMIN — HYDROCODONE BITARTRATE AND ACETAMINOPHEN 1 TABLET: 10; 325 TABLET ORAL at 08:06

## 2022-06-14 RX ADMIN — METRONIDAZOLE 500 MG: 500 INJECTION, SOLUTION INTRAVENOUS at 07:06

## 2022-06-14 NOTE — PT/OT/SLP PROGRESS
Physical Therapy         Treatment        Niurka Mata   MRN: [id number]     PT Received On: 06/14/22  PT Start Time: 1028     PT Stop Time: 1044    PT Total Time (min): 16 min       Billable Minutes:  Therapeutic Activity 16  Total Minutes: 16    Treatment Type: Treatment  PT/PTA: PTA     PTA Visit Number: 1       General Precautions: Standard, fall  Orthopedic Precautions: Orthopedic Precautions : N/A   Braces:      Spiritual, Cultural Beliefs, Jewish Practices, Values that Affect Care: no    Subjective:  Communicated with nurse prior to session.         Objective:  Patient found supine in bed, with Patient found with: peripheral IV    Functional Mobility:  Bed Mobility:   Supine to sit: Contact Guard Assistance   Sit to supine: Contact Guard Assistance   Rolling: Contact Guard Assistance   Scooting: Contact Guard Assistance    Balance:   Static Sit: GOOD-: Takes MODERATE challenges from all directions but inconsistently  Dynamic Sit:  GOOD-: Incosistently Maintains balance through MODERATE excursions of active trunk movement,     Static Stand: FAIR: Maintains without assist but unable to take challenges  Dynamic stand: FAIR: Needs CONTACT GUARD during gait    Transfer Training:  Sit to stand:Contact Guard Assistance with Rolling Walker x3 trials with cues for UE placement    Gait Training:  Patient gait trained   15  Feet x2 trials on level tile with Rolling Walker with Contact Guard Assistance.  Pt with demonstarting a  reciprocal gait with decreased patrizia.Impairments contributing to gait deviations include decreased strength      Additional Treatment:  Pt performed static standing x3 minutes with cues for upright posture.     Activity Tolerance:  Patient tolerated treatment well    Patient left HOB elevated with all lines intact and call button in reach.    Assessment:  Niurka Mata is a 72 y.o. female with a medical diagnosis of Bacterial cystitis. She presents with increased endurance.    Rehab  potential is good.    Activity tolerance: Good    Discharge recommendations: Discharge Facility/Level of Care Needs: nursing facility, skilled     Equipment recommendations:       GOALS:   Multidisciplinary Problems     Physical Therapy Goals        Problem: Physical Therapy    Goal Priority Disciplines Outcome Goal Variances Interventions   Physical Therapy Goal     PT, PT/OT Ongoing, Progressing     Description: Goals to be met by: 22     Patient will increase functional independence with mobility by performin. Supine to sit with Stand-by Assistance  2. Sit to supine with Stand-by Assistance  3. Sit to stand transfer with Stand-by Assistance  4. Bed to chair transfer with Stand-by Assistance using Rolling Walker  5. Gait  x 50 feet with Contact Guard Assistance using Rolling Walker.   6. Increased functional strength to 4/5 in BLE                     PLAN:    Patient to be seen 5 x/week  to address the above listed problems via gait training, therapeutic activities  Plan of Care expires: 22  Plan of Care reviewed with: patient         2022

## 2022-06-14 NOTE — PROGRESS NOTES
[facility] Hospital Medicine Progress Note        Chief Complaint: Inpatient Follow-up for Diarrhea    HPI:     Interval Hx:     She was afebrile, hemodynamically stable.  When seen and examined at bedside she was alert, comfortable resting in the bed.  Has no new complaints or concerns.  Wants to go home.  Had one BM today. No labs were available for this morning.  Stool culture yet to be collected.  Blood cultures negative.    Objective/physical exam:  Vitals:    06/14/22 0000 06/14/22 0124 06/14/22 0430 06/14/22 0721   BP: 109/62 (!) 110/58 130/66 127/68   Pulse: 66 64 61 66   Resp: 20   18   Temp: 98.9 °F (37.2 °C) 98.1 °F (36.7 °C) 97.6 °F (36.4 °C) 98.3 °F (36.8 °C)   TempSrc:  Oral Oral Oral   SpO2: 98%  98% 98%   Weight:          General: In no acute distress, afebrile  Respiratory: Clear to auscultation bilaterally  Cardiovascular: S1, S2, no appreciable murmur  Abdomen: Soft, nontender, BS +  MSK: Warm, no lower extremity edema, no clubbing or cyanosis  Skin: Tattoos bilateral arms, Stage II sacral pressure ulcer, candidiasis beneath breasts  Neurologic: Alert and oriented x4, moving all extremities with good strength     Lab Results   Component Value Date     06/13/2022    K 3.8 06/13/2022    CO2 20 (L) 06/13/2022    BUN 8.1 (L) 06/13/2022    CREATININE 0.69 06/13/2022    CALCIUM 8.0 (L) 06/13/2022    EGFRNONAA >60 06/13/2022      Lab Results   Component Value Date    ALT 12 06/12/2022    AST 23 06/12/2022    ALKPHOS 89 06/12/2022    BILITOT 0.8 06/12/2022      Lab Results   Component Value Date    WBC 12.5 (H) 06/13/2022    HGB 12.1 06/13/2022    HCT 38.2 06/13/2022    MCV 90.5 06/13/2022     06/13/2022          Scheduled medications:   aspirin  81 mg Oral Daily    atenoloL  50 mg Oral Daily    atorvastatin  20 mg Oral QHS    clopidogreL  75 mg Oral Daily    collagenase   Topical (Top) BID    enoxaparin  40 mg Subcutaneous Daily    gabapentin  600 mg  Oral TID    insulin detemir U-100  15 Units Subcutaneous QHS    metFORMIN  850 mg Oral BID    miconazole NITRATE 2 %   Topical (Top) BID    nystatin-triamcinolone   Topical (Top) TID    pantoprazole  40 mg Oral Daily    SITagliptin  100 mg Oral Daily      PRN Meds:  acetaminophen, acetaminophen, aluminum-magnesium hydroxide-simethicone, cloNIDine, dextrose 10%, dextrose 10%, diphenhydrAMINE, glucagon (human recombinant), glucose, glucose, HYDROcodone-acetaminophen, HYDROcodone-acetaminophen, insulin aspart U-100, naloxone, ondansetron, promethazine, sodium chloride 0.9%, zolpidem     Assessment/Plan:    Failure to thrive  Gastroenteritis  Metabolic acidosis, improved  Chronic pancreatitis per CT scan  Stage II sacral pressure ulcer  Vulvovaginal candidiasis  Candidiasis under breasts  Type 2 insulin-dependent diabetes mellitus, Diabetic polyneuropathy  Essential hypertension  Coronary artery disease  Tobacco abuse  obesity  Gastroesophageal reflux disease  Peripheral arterial disease  Chronic back pain with multiple chronic thoracolumbar compression fractures  Possible dementia or psychosis      Plan:  - Advance diet.  Stool cultures, leukocytes are to be collected.  Will check c.diff if she has more than 3BMS today. CT abdomen unremarkable.  Encourage oral intake as tolerated  - Currently off all antibiotics, laxatives.  Monitor for now  - Received Diflucan for candidiasis.  Encouraged hygiene.  Consulted wound care  - Other home medications were reviewed and renewed    Adrian Corea MD

## 2022-06-14 NOTE — NURSING
"Discussed skin integrity status and plan of care with assigned nurse Nia PARKER. Attempted to visit and assess left heel wound however, patient declined/refused stating " the nurse just changed my heel dressing and I am going home later today."Assigned nurse reports no discharge orders yet received, recommend wound care physician consult for pressure injury wounds POA.  "

## 2022-06-14 NOTE — PROGRESS NOTES
"[facility] - 4th Floor Medical Telemetry  Adult Nutrition  Progress Note    SUMMARY       Recommendations    1. Continue current diet as tolerated, add low fat restriction    2. Will monitor for supplement needs  3. Consider MVI supplementation      Malnutrition Assessment  Malnutrition Type: chronic illness  Energy Intake: moderate energy intake          Weight Loss (Malnutrition): greater than 10% in 6 months  Subcutaneous Fat (Malnutrition): moderate depletion   Orbital Region (Subcutaneous Fat Loss): mild depletion  Upper Arm Region (Subcutaneous Fat Loss): moderate depletion   [place] Region (Muscle Loss): well nourished  Clavicle Bone Region (Muscle Loss): well nourished   Edema (Fluid Accumulation): 0-->no edema present             Reason for Assessment    Reason For Assessment: identified at risk by screening criteria    Diagnosis:    FTT    Gastroenteritis   Chronic pancreatitis    Stage II sacral PU   Vulvovaginal candidiasis    Candidiasis under breasts    Type 2 DM    CAD    GERD    PAD    Chronic back pain with multiple chronic thoracolumbar compression fractures  Possible dementia or psychosis    Relevant Medical History: HTN, DM2, CAD    Nutrition assessment:   Pt seen at bedside, diet just advanced to soft and bite sized, tolerating well. 100% po intake per report. No c/o n/v/d/c at this time, LBM 6/14. Pt denies unintentional wt loss PTA, noted wt loss per EMR hx. Current wt entered incorrectly at lb instead of kg. PU w/ partial thickness present to sacrum.    Nutrition Risk Screen    Nutrition Risk Screen: large or nonhealing wound, burn or pressure injury    Nutrition/Diet History    Spiritual, Cultural Beliefs, Episcopalian Practices, Values that Affect Care: no  Food Allergies: NKFA  Factors Affecting Nutritional Intake: altered gastrointestinal function    Anthropometrics    Temp: 98.5 °F (36.9 °C)  Height: 5' 0.63" (154 cm)  Height (inches): 60.63 in  Weight: 70 kg (154 lb 5.2 " oz)  Weight (lb): 154.32 lb  Ideal Body Weight (IBW), Female: 103.15 lb  % Ideal Body Weight, Female (lb): 149.61 %  BMI (Calculated): 29.5  Usual Body Weight (UBW), k.4 kg (per EMR hx)  % Usual Body Weight: 84.11  % Weight Change From Usual Weight: -16.07 %       Lab/Procedures/Meds    Pertinent Labs Reviewed: reviewed  Pertinent Labs Comments: : Glu 123, BUN 8.1, Ca 8.0  Pertinent Medications Reviewed: reviewed  Pertinent Medications Comments: SSI, Metformin, Zofran PRN, Protonix,      Estimated/Assessed Needs    Weight Used For Calorie Calculations: 70 kg (154 lb 5.2 oz)  Energy Calorie Requirements (kcal): 7693-7834 kcal/day based on 25-30 kcal/kg  Energy Need Method: Kcal/kg  Protein Requirements: 70-84 gm/day based on 1-1.2 gm/kg  Weight Used For Protein Calculations: 70 kg (154 lb 5.2 oz)  Fluid Requirements (mL): 1 lm/kcal or per MD     RDA Method (mL): 1750         Nutrition Prescription Ordered    Current Diet Order: Soft and bite sized    Evaluation of Received Nutrient/Fluid Intake    Energy Calories Required: meeting needs  Protein Required: meeting needs  Fluid Required: meeting needs  Tolerance: tolerating  % Intake of Estimated Energy Needs: 75 - 100 %  % Meal Intake: 75 - 100 %     Nutrition Problem  Malnutrition    Related to (etiology):   Unknown etiology    Signs and Symptoms (as evidenced by):   >10% wt loss in 6 months, fat loss presnt    Interventions(treatment strategy):  General / Healthful Diet, Multivitamin / Mineral supplement therapy and Collaboration with other providers    Nutrition Diagnosis Status:   New          Nutrition Risk    Level of Risk/Frequency of Follow-up: moderate     Monitor and Evaluation    Food and Nutrient Intake: food and beverage intake  Anthropometric Measurements: weight change  Biochemical Data, Medical Tests and Procedures: electrolyte and renal panel     Goals:   1. Maintain weight during hospitalization    2. Meet >75% estimated needs  Nutrition  Goal Status: new    Nutrition Follow-Up    RD Follow-up?: Yes

## 2022-06-14 NOTE — PROGRESS NOTES
Pharmacist Renal Dose Adjustment Note    Niurka Mata is a 72 y.o. female being treated with the medication gabapentin.    Patient Data:    Vital Signs (Most Recent):  Temp: 98.3 °F (36.8 °C) (06/14/22 0721)  Pulse: 66 (06/14/22 0721)  Resp: 18 (06/14/22 0809)  BP: 127/68 (06/14/22 0721)  SpO2: 98 % (06/14/22 0721) Vital Signs (72h Range):  Temp:  [97.6 °F (36.4 °C)-98.9 °F (37.2 °C)]   Pulse:  []   Resp:  [6-20]   BP: (109-152)/(58-99)   SpO2:  [95 %-100 %]      Recent Labs   Lab 06/12/22  1119 06/13/22  0347   CREATININE 0.83 0.69     Serum creatinine: 0.69 mg/dL 06/13/22 0347  Estimated creatinine clearance: 38.3 mL/min    Gabapentin 600 mg PO TID will be changed to gabapentin 600 mg PO BID based on patient's eCrCl of ~ 39 mL/min.    Pharmacist's Name: Missy Baez, VarunD  Pharmacist's Extension: 0110

## 2022-06-14 NOTE — PT/OT/SLP PROGRESS
Occupational Therapy   Treatment    Name: Niurka Mata  MRN: [id number]  Admitting Diagnosis:  Bacterial cystitis       Recommendations:     Discharge Recommendations: nursing facility, skilled, home with home health  Discharge Equipment Recommendations:  none    Assessment:     Niurka Mata is a 72 y.o. female with a medical diagnosis of Bacterial cystitis. Performance deficits affecting function are weakness, gait instability, impaired endurance, impaired balance, impaired self care skills, impaired functional mobilty. Pt is making good progress toward OT goals. Pt declines participation in ADLs including toileting or grooming, however reports ability to perform these tasks w/ IND. Additionally, pt reports utilizing HH services and had recently been d/c from OT services, however cont to utilize PT services. Pt performed t/f to bedside chair w/ CGA and RW. Pt agreeable to perform ADL tasks during tomorrow's session to ensure safety and IND w/ such tasks. At this time, OT recs include home w/ HH services vs SNF, pending pt progress.    Rehab Prognosis:  Fair; patient would benefit from acute skilled OT services to address these deficits and reach maximum level of function.       Plan:     Patient to be seen 3 x/week, 2 x/week to address the above listed problems via self-care/home management, therapeutic activities, therapeutic exercises  · Plan of Care Reviewed with: patient    Subjective     Pain/Comfort:  · Pain Rating 1: 0/10    Objective:     Communicated with: RN prior to session.  Patient found supine with peripheral IV upon OT entry to room.    General Precautions: Standard, fall   Respiratory Status: Room air     Occupational Performance:     Bed Mobility:    · Patient completed Supine to Sit with stand by assistance  · Patient completed Sit to Supine with stand by assistance     Functional Mobility/Transfers:  · Patient completed Sit <> Stand Transfer with contact guard assistance  with  rolling  walker   · Patient completed Bed <> Chair Transfer using Step Transfer technique with contact guard assistance with rolling walker   · Pt performed standing ax to improve endurance, in which pt stood approx. 3 minutes w/ CGA using RW prior to requiring seated rest break    [facility] 6 Click ADL: 19    Treatment & Education:  Pt participated in extensive education regarding pressure relief and pressure ulcer prevention. Pt verbalizes understanding, and would benefit from cont education.    Patient left HOB elevated with all lines intact and call button in reachEducation:      GOALS:   Multidisciplinary Problems     Occupational Therapy Goals        Problem: Occupational Therapy    Goal Priority Disciplines Outcome Interventions   Occupational Therapy Goal     OT, PT/OT     Description: MI for grooming routine sitting in w/c  MI for LB dressing  MI for toilet t/f with RW                   Time Tracking:     OT Date of Treatment: 06/14/22  OT Start Time: 1027  OT Stop Time: 1050  OT Total Time (min): 23 min    Billable Minutes:Self Care/Home Management 10  Therapeutic Activity 13          LINDA Visit Number: 1    6/14/2022

## 2022-06-14 NOTE — PROGRESS NOTES
Pharmacist Renal Dose Adjustment Note    Niurka Mata is a 72 y.o. female being treated with the medication sitagliptin.    Patient Data:    Vital Signs (Most Recent):  Temp: 98.3 °F (36.8 °C) (06/14/22 0721)  Pulse: 66 (06/14/22 0721)  Resp: 18 (06/14/22 0809)  BP: 127/68 (06/14/22 0721)  SpO2: 98 % (06/14/22 0721) Vital Signs (72h Range):  Temp:  [97.6 °F (36.4 °C)-98.9 °F (37.2 °C)]   Pulse:  []   Resp:  [6-20]   BP: (109-152)/(58-99)   SpO2:  [95 %-100 %]      Recent Labs   Lab 06/12/22  1119 06/13/22  0347   CREATININE 0.83 0.69     Serum creatinine: 0.69 mg/dL 06/13/22 0347  Estimated creatinine clearance: 38.3 mL/min    Sitagliptin 100 mg PO daily will be changed to sitagliptin 50 mg PO daily based on the patient's eCrCl of ~ 39 mL/min.    Pharmacist's Name: Missy Baez, VarunD  Pharmacist's Extension: 2019

## 2022-06-15 VITALS
TEMPERATURE: 99 F | DIASTOLIC BLOOD PRESSURE: 89 MMHG | OXYGEN SATURATION: 97 % | SYSTOLIC BLOOD PRESSURE: 134 MMHG | HEIGHT: 61 IN | WEIGHT: 154.31 LBS | BODY MASS INDEX: 29.13 KG/M2 | HEART RATE: 78 BPM | RESPIRATION RATE: 18 BRPM

## 2022-06-15 LAB
ALBUMIN SERPL-MCNC: 2 GM/DL (ref 3.4–4.8)
ALBUMIN/GLOB SERPL: 0.7 RATIO (ref 1.1–2)
ALP SERPL-CCNC: 60 UNIT/L (ref 40–150)
ALT SERPL-CCNC: 7 UNIT/L (ref 0–55)
AST SERPL-CCNC: 14 UNIT/L (ref 5–34)
BASOPHILS # BLD AUTO: 0.06 X10(3)/MCL (ref 0–0.2)
BASOPHILS NFR BLD AUTO: 0.8 %
BILIRUBIN DIRECT+TOT PNL SERPL-MCNC: 0.2 MG/DL
BUN SERPL-MCNC: 4.1 MG/DL (ref 9.8–20.1)
C DIFF TOX GENS STL QL NAA+PROBE: NOT DETECTED
CALCIUM SERPL-MCNC: 7.8 MG/DL (ref 8.4–10.2)
CHLORIDE SERPL-SCNC: 112 MMOL/L (ref 98–107)
CO2 SERPL-SCNC: 20 MMOL/L (ref 23–31)
CREAT SERPL-MCNC: 0.69 MG/DL (ref 0.55–1.02)
DEPRECATED CALCIDIOL+CALCIFEROL SERPL-MC: 33.9 NG/ML (ref 30–80)
EOSINOPHIL # BLD AUTO: 0.22 X10(3)/MCL (ref 0–0.9)
EOSINOPHIL NFR BLD AUTO: 2.9 %
ERYTHROCYTE [DISTWIDTH] IN BLOOD BY AUTOMATED COUNT: 15.9 % (ref 11.5–17)
EST. AVERAGE GLUCOSE BLD GHB EST-MCNC: 134.1 MG/DL
GLOBULIN SER-MCNC: 2.7 GM/DL (ref 2.4–3.5)
GLUCOSE SERPL-MCNC: 75 MG/DL (ref 82–115)
HBA1C MFR BLD: 6.3 %
HCT VFR BLD AUTO: 34.1 % (ref 37–47)
HGB BLD-MCNC: 10.6 GM/DL (ref 12–16)
IMM GRANULOCYTES # BLD AUTO: 0.06 X10(3)/MCL (ref 0–0.02)
IMM GRANULOCYTES NFR BLD AUTO: 0.8 % (ref 0–0.43)
LYMPHOCYTES # BLD AUTO: 2.06 X10(3)/MCL (ref 0.6–4.6)
LYMPHOCYTES NFR BLD AUTO: 26.8 %
MAGNESIUM SERPL-MCNC: 1.4 MG/DL (ref 1.6–2.6)
MCH RBC QN AUTO: 28.7 PG (ref 27–31)
MCHC RBC AUTO-ENTMCNC: 31.1 MG/DL (ref 33–36)
MCV RBC AUTO: 92.4 FL (ref 80–94)
MONOCYTES # BLD AUTO: 0.77 X10(3)/MCL (ref 0.1–1.3)
MONOCYTES NFR BLD AUTO: 10 %
NEUTROPHILS # BLD AUTO: 4.5 X10(3)/MCL (ref 2.1–9.2)
NEUTROPHILS NFR BLD AUTO: 58.7 %
NRBC BLD AUTO-RTO: 0 %
PLATELET # BLD AUTO: 320 X10(3)/MCL (ref 130–400)
PMV BLD AUTO: 9.7 FL (ref 9.4–12.4)
POCT GLUCOSE: 189 MG/DL (ref 70–110)
POTASSIUM SERPL-SCNC: 4.1 MMOL/L (ref 3.5–5.1)
PROT SERPL-MCNC: 4.7 GM/DL (ref 5.8–7.6)
RBC # BLD AUTO: 3.69 X10(6)/MCL (ref 4.2–5.4)
SODIUM SERPL-SCNC: 139 MMOL/L (ref 136–145)
WBC # SPEC AUTO: 7.7 X10(3)/MCL (ref 4.5–11.5)

## 2022-06-15 PROCEDURE — 82306 VITAMIN D 25 HYDROXY: CPT | Performed by: INTERNAL MEDICINE

## 2022-06-15 PROCEDURE — 83036 HEMOGLOBIN GLYCOSYLATED A1C: CPT | Performed by: INTERNAL MEDICINE

## 2022-06-15 PROCEDURE — S0030 INJECTION, METRONIDAZOLE: HCPCS | Performed by: INTERNAL MEDICINE

## 2022-06-15 PROCEDURE — 83735 ASSAY OF MAGNESIUM: CPT | Performed by: INTERNAL MEDICINE

## 2022-06-15 PROCEDURE — 36415 COLL VENOUS BLD VENIPUNCTURE: CPT | Performed by: INTERNAL MEDICINE

## 2022-06-15 PROCEDURE — 25000003 PHARM REV CODE 250: Performed by: INTERNAL MEDICINE

## 2022-06-15 PROCEDURE — 85025 COMPLETE CBC W/AUTO DIFF WBC: CPT | Performed by: INTERNAL MEDICINE

## 2022-06-15 PROCEDURE — 94761 N-INVAS EAR/PLS OXIMETRY MLT: CPT

## 2022-06-15 PROCEDURE — 63600175 PHARM REV CODE 636 W HCPCS: Performed by: INTERNAL MEDICINE

## 2022-06-15 PROCEDURE — 80053 COMPREHEN METABOLIC PANEL: CPT | Performed by: INTERNAL MEDICINE

## 2022-06-15 RX ORDER — VANCOMYCIN HYDROCHLORIDE 125 MG/1
125 CAPSULE ORAL EVERY 6 HOURS
Qty: 40 CAPSULE | Refills: 0 | Status: SHIPPED | OUTPATIENT
Start: 2022-06-15 | End: 2022-06-25

## 2022-06-15 RX ADMIN — HYDROCODONE BITARTRATE AND ACETAMINOPHEN 1 TABLET: 10; 325 TABLET ORAL at 10:06

## 2022-06-15 RX ADMIN — GABAPENTIN 600 MG: 300 CAPSULE ORAL at 10:06

## 2022-06-15 RX ADMIN — CLOPIDOGREL 75 MG: 75 TABLET, FILM COATED ORAL at 10:06

## 2022-06-15 RX ADMIN — VANCOMYCIN HYDROCHLORIDE 125 MG: 125 CAPSULE ORAL at 01:06

## 2022-06-15 RX ADMIN — PANTOPRAZOLE SODIUM 40 MG: 40 TABLET, DELAYED RELEASE ORAL at 10:06

## 2022-06-15 RX ADMIN — SITAGLIPTIN 50 MG: 50 TABLET, FILM COATED ORAL at 10:06

## 2022-06-15 RX ADMIN — ATENOLOL 50 MG: 50 TABLET ORAL at 10:06

## 2022-06-15 RX ADMIN — CIPROFLOXACIN 400 MG: 2 INJECTION, SOLUTION INTRAVENOUS at 05:06

## 2022-06-15 RX ADMIN — METRONIDAZOLE 500 MG: 500 INJECTION, SOLUTION INTRAVENOUS at 03:06

## 2022-06-15 RX ADMIN — ASPIRIN 81 MG CHEWABLE TABLET 81 MG: 81 TABLET CHEWABLE at 10:06

## 2022-06-15 RX ADMIN — VANCOMYCIN HYDROCHLORIDE 125 MG: 125 CAPSULE ORAL at 05:06

## 2022-06-15 RX ADMIN — VANCOMYCIN HYDROCHLORIDE 125 MG: 125 CAPSULE ORAL at 11:06

## 2022-06-15 NOTE — PLAN OF CARE
Problem: Skin Injury Risk Increased  Goal: Skin Health and Integrity  6/15/2022 1034 by Alysha Bang RN  Outcome: Met  6/15/2022 1034 by Alysha Bang RN  Outcome: Adequate for Care Transition     Problem: Impaired Wound Healing  Goal: Optimal Wound Healing  6/15/2022 1034 by Alysha Bang RN  Outcome: Met  6/15/2022 1034 by Alysha Bang RN  Outcome: Adequate for Care Transition     Problem: Occupational Therapy  Goal: Occupational Therapy Goal  Description: MI for grooming routine sitting in w/c  MI for LB dressing  MI for toilet t/f with RW  Outcome: Met     Problem: Physical Therapy  Goal: Physical Therapy Goal  Description: Goals to be met by: 22     Patient will increase functional independence with mobility by performin. Supine to sit with Stand-by Assistance  2. Sit to supine with Stand-by Assistance  3. Sit to stand transfer with Stand-by Assistance  4. Bed to chair transfer with Stand-by Assistance using Rolling Walker  5. Gait  x 50 feet with Contact Guard Assistance using Rolling Walker.   6. Increased functional strength to 4/5 in BLE    Outcome: Met     Problem: Infection  Goal: Absence of Infection Signs and Symptoms  6/15/2022 1034 by Alysha Bang RN  Outcome: Met  6/15/2022 1034 by Alysha Bang RN  Outcome: Adequate for Care Transition

## 2022-06-15 NOTE — DISCHARGE SUMMARY
.  [facility] Hospital Medicine Discharge Summary    Admit Date: 6/12/2022  Discharge Date and Time: 6/15/47593:55 AM  Admitting Physician: Hospitalist team   Discharging Physician: Dmitri Godfrey MD.  Primary Care Physician: Roney Leal MD      Discharge Diagnoses:  Failure to thrive  C. Diff colitis  Metabolic acidosis, improved  Chronic pancreatitis per CT scan  Stage II sacral pressure ulcer  Vulvovaginal candidiasis, resolved  Candidiasis under breasts. resp;jass  Type 2 insulin-dependent diabetes mellitus, Diabetic polyneuropathy  Essential hypertension  Coronary artery disease  Tobacco abuse  obesity  Gastroesophageal reflux disease  Peripheral arterial disease  Chronic back pain with multiple chronic thoracolumbar compression fractures  Possible dementia or psychosis    Hospital Course:   72-year-old white female with a history past medical history of hypertension, insulin-dependent type 2 diabetes, coronary artery disease and other medical problems as outlined below who presents to the emergency room complaining of a 2 day history of generalized abdominal pain with associated diarrhea.  No relieving factors.  Symptoms exacerbated by oral intake.  Denies nausea, vomiting, fever, chills, chest pain, shortness of breath.  Patient also reports rash under breasts and sacral pressure ulcer.  Workup in the emergency room included a CT of the abdomen and pelvis without contrast which revealed a few scattered dilated loops of small bowel without an abrupt transition point which may represent an ileus ileus.  Evidence of chronic pancreatitis was also noted.  Otherwise exam was negative.  Laboratory work revealed a white blood cell count of 16,300, a bicarbonate of 18, and urinalysis positive for nitrites, leukocytes, and 6 white blood cells as well as 1+ bacteria.  Troponin I was negative.  Otherwise laboratory work was unremarkable.  Patient was given a dose of Rocephin in  "emergency room and very promptly admitted to the hospital medicine service.  Patient's urinary symptoms resolved again having diarrhea.  She tested positive for C diff antibiotics were changed to oral vancomycin.  She is feeling much better and no diarrheal episodes since yesterday afternoon.  Denies any abdominal pain.  Tolerating p.o..  She is asking if she can go home today and follow up with her PCP next week.  Her son is leaving town and she would like to be able to see him off.  Discussed that her vitals are stable and blood work looks okay.  Stressed importance of compliance with medications and follow-up with her PCP the next available appointment.    Vitals:  Blood pressure (!) 150/80, pulse 78, temperature 98.1 °F (36.7 °C), temperature source Oral, resp. rate 18, height 5' 0.63" (1.54 m), weight 70 kg (154 lb 5.2 oz), SpO2 99 %..    Physical Exam:  Awake, Alert, Oriented x 3, No new focal Neurologic deficit, Normal Affect  NC/AT, PERRLA, EOMI  Supple neck, no JVD, No cervical lymphadenopathy  Symmetrical chest, Good air entry bilaterally. No rhonchi, wheezes, crackles appreciated  RRR, No gallop, rub or murmur  +ve Bowel sounds x4, Abd soft and non tender, no rebound, guarding or rigidity  No Cyanosis, cludding or edema, No new rash or bruises    Procedures Performed: No admission procedures for hospital encounter.     Significant Diagnostic Studies: See Full reports for all details  Admission on 06/12/2022   Component Date Value    Sodium Level 06/12/2022 137     Potassium Level 06/12/2022 4.8     Chloride 06/12/2022 104     Carbon Dioxide 06/12/2022 18 (A)    Glucose Level 06/12/2022 146 (A)    Blood Urea Nitrogen 06/12/2022 9.2 (A)    Creatinine 06/12/2022 0.83     Calcium Level Total 06/12/2022 8.7     Protein Total 06/12/2022 6.8     Albumin Level 06/12/2022 2.8 (A)    Globulin 06/12/2022 4.0 (A)    Albumin/Globulin Ratio 06/12/2022 0.7 (A)    Bilirubin Total 06/12/2022 0.8     Alkaline " Phosphatase 06/12/2022 89     Alanine Aminotransferase 06/12/2022 12     Aspartate Aminotransfera* 06/12/2022 23     Estimated GFR-Non Madeleine* 06/12/2022 >60     Lipase Level 06/12/2022 7     Color, UA 06/12/2022 Yellow     Appearance, UA 06/12/2022 Clear     Specific Gravity, UA 06/12/2022 1.011     pH, UA 06/12/2022 6.5     Protein, UA 06/12/2022 Negative     Glucose, UA 06/12/2022 Negative     Ketones, UA 06/12/2022 Trace (A)    Blood, UA 06/12/2022 Negative     Bilirubin, UA 06/12/2022 Negative     Urobilinogen, UA 06/12/2022 0.2     Nitrites, UA 06/12/2022 Positive (A)    Leukocyte Esterase, UA 06/12/2022 Trace (A)    Troponin-I 06/12/2022 <0.010     WBC 06/12/2022 16.3 (A)    RBC 06/12/2022 4.65     Hgb 06/12/2022 13.2     Hct 06/12/2022 41.1     MCV 06/12/2022 88.4     MCH 06/12/2022 28.4     MCHC 06/12/2022 32.1 (A)    RDW 06/12/2022 16.5     Platelet 06/12/2022 468 (A)    MPV 06/12/2022 10.0     Neut % 06/12/2022 78.7     Lymph % 06/12/2022 14.0     Mono % 06/12/2022 6.5     Eos % 06/12/2022 0.1     Basophil % 06/12/2022 0.3     Lymph # 06/12/2022 2.29     Neut # 06/12/2022 12.8 (A)    Mono # 06/12/2022 1.06     Eos # 06/12/2022 0.01     Baso # 06/12/2022 0.05     IG# 06/12/2022 0.06 (A)    IG% 06/12/2022 0.4     NRBC% 06/12/2022 0.0     RBC, UA 06/12/2022 <5     WBC, UA 06/12/2022 6 (A)    Squamous Epithelial Cell* 06/12/2022 <4     Bacteria, UA 06/12/2022 1+ (A)    SARS COV-2 MOLECULAR 06/12/2022 Negative     Fecal Leukocyte Stain 06/14/2022 Positive (A)    Leukocyte Positive Contr* 06/14/2022 Positive     Leukocyte Negative Contr* 06/14/2022 Negative     POCT Glucose 06/12/2022 122 (A)    Hemoglobin A1c 06/13/2022 7.3 (A)    Estimated Average Glucose 06/13/2022 162.8     Sodium Level 06/13/2022 138     Potassium Level 06/13/2022 3.8     Chloride 06/13/2022 109 (A)    Carbon Dioxide 06/13/2022 20 (A)    Glucose Level 06/13/2022 123 (A)    Blood Urea  Nitrogen 06/13/2022 8.1 (A)    Creatinine 06/13/2022 0.69     BUN/Creatinine Ratio 06/13/2022 12     Calcium Level Total 06/13/2022 8.0 (A)    Estimated GFR-Non Madeleine* 06/13/2022 >60     Anion Gap 06/13/2022 9.0     WBC 06/13/2022 12.5 (A)    RBC 06/13/2022 4.22     Hgb 06/13/2022 12.1     Hct 06/13/2022 38.2     MCV 06/13/2022 90.5     MCH 06/13/2022 28.7     MCHC 06/13/2022 31.7 (A)    RDW 06/13/2022 16.4     Platelet 06/13/2022 330     MPV 06/13/2022 9.3 (A)    Neut % 06/13/2022 73.1     Lymph % 06/13/2022 18.1     Mono % 06/13/2022 7.1     Eos % 06/13/2022 0.6     Basophil % 06/13/2022 0.5     Lymph # 06/13/2022 2.26     Neut # 06/13/2022 9.2     Mono # 06/13/2022 0.89     Eos # 06/13/2022 0.08     Baso # 06/13/2022 0.06     IG# 06/13/2022 0.07 (A)    IG% 06/13/2022 0.6 (A)    NRBC% 06/13/2022 0.0     POC Glucose 06/13/2022 89     POCT Glucose 06/13/2022 116 (A)    POCT Glucose 06/13/2022 114 (A)    POCT Glucose 06/13/2022 84     Clostridium Difficile GD* 06/14/2022 Positive (A)    Clostridium Difficile To* 06/14/2022 Negative     Clostridium difficile to* 06/14/2022 Not Detected     Sodium Level 06/15/2022 139     Potassium Level 06/15/2022 4.1     Chloride 06/15/2022 112 (A)    Carbon Dioxide 06/15/2022 20 (A)    Glucose Level 06/15/2022 75 (A)    Blood Urea Nitrogen 06/15/2022 4.1 (A)    Creatinine 06/15/2022 0.69     Calcium Level Total 06/15/2022 7.8 (A)    Protein Total 06/15/2022 4.7 (A)    Albumin Level 06/15/2022 2.0 (A)    Globulin 06/15/2022 2.7     Albumin/Globulin Ratio 06/15/2022 0.7 (A)    Bilirubin Total 06/15/2022 0.2     Alkaline Phosphatase 06/15/2022 60     Alanine Aminotransferase 06/15/2022 7     Aspartate Aminotransfera* 06/15/2022 14     Estimated GFR-Non Madeleine* 06/15/2022 >60     WBC 06/15/2022 7.7     RBC 06/15/2022 3.69 (A)    Hgb 06/15/2022 10.6 (A)    Hct 06/15/2022 34.1 (A)    MCV 06/15/2022 92.4     MCH 06/15/2022 28.7      MCHC 06/15/2022 31.1 (A)    RDW 06/15/2022 15.9     Platelet 06/15/2022 320     MPV 06/15/2022 9.7     Neut % 06/15/2022 58.7     Lymph % 06/15/2022 26.8     Mono % 06/15/2022 10.0     Eos % 06/15/2022 2.9     Basophil % 06/15/2022 0.8     Lymph # 06/15/2022 2.06     Neut # 06/15/2022 4.5     Mono # 06/15/2022 0.77     Eos # 06/15/2022 0.22     Baso # 06/15/2022 0.06     IG# 06/15/2022 0.06 (A)    IG% 06/15/2022 0.8 (A)    NRBC% 06/15/2022 0.0     Magnesium Level 06/15/2022 1.40 (A)    Vit D 25 OH 06/15/2022 33.9     POCT Glucose 06/14/2022 189 (A)    Hemoglobin A1c 06/15/2022 6.3     Estimated Average Glucose 06/15/2022 134.1         Microbiology Results (last 7 days)     Procedure Component Value Units Date/Time    C Diff Toxin by PCR [[id number]]  (Normal) Collected: 06/14/22 1741    Order Status: Completed Specimen: Stool Updated: 06/15/22 0643     Clostridium difficile toxin PCR Not Detected    Clostridium Diff Toxin, A & B, EIA [[id number]]  (Abnormal) Collected: 06/14/22 1741    Order Status: Completed Specimen: Stool Updated: 06/14/22 1937     Clostridium Difficile GDH Antigen Positive     Clostridium Difficile Toxin A/B Negative    Stool Culture [[id number]] Collected: 06/14/22 1741    Order Status: Sent Specimen: Stool Updated: 06/14/22 1746           CT Abdomen Pelvis  Without Contrast    Result Date: 6/12/2022  EXAMINATION: CT ABDOMEN PELVIS WITHOUT CONTRAST CLINICAL HISTORY: Abdominal pain, acute, nonlocalized; TECHNIQUE: CT imaging was performed of the abdomen and pelvis without intravenous contrast. Dose length product is 560 mGycm. Automatic exposure control, adjustment of mA/kV or iterative reconstruction technique was used to limit radiation dose. COMPARISON: CT abdomen pelvis dated 01/12/2022 FINDINGS: Assessment of the visceral organs and vasculature is limited by the lack of IV contrast. Liver/biliary: No concerning hepatic findings. The gallbladder is not identified.  Pancreas: Dystrophic pancreatic calcifications are suggestive of chronic pancreatitis. Spleen: Normal. Adrenals: Normal. Kidneys, ureters and bladder: There are bilateral nonobstructing renal calculi.  There is an exophytic left renal cyst.  There is no hydronephrosis.  The bladder is within normal limits. Reproductive organs: No pelvic masses. Stomach/bowel: There are a few scattered mildly dilated loops of small bowel without an abrupt transition point.  There is no definite focal bowel wall thickening. Lymph nodes: No pathologically enlarged lymph node identified with noncontrast technique. Peritoneum: No ascites or free air. Vessels: Extensive vascular calcifications without aneurysm. Abdominal wall: Normal. Lung bases: No consolidation or pleural effusion. Bones: Chronic thoracolumbar compression deformities.     Few scattered dilated loops of small bowel without an abrupt transition point, may represent an ileus.  Otherwise no acute abnormality of the abdomen or pelvis. Electronically signed by: Jessica Petty Date:    06/12/2022 Time:    11:59  - pulls last radiology orders        Medication List      START taking these medications    vancomycin 125 MG capsule  Commonly known as: VANCOCIN  Take 1 capsule (125 mg total) by mouth every 6 (six) hours. for 10 days        CONTINUE taking these medications    aspirin 81 MG Chew     atenoloL 50 MG tablet  Commonly known as: TENORMIN     atorvastatin 20 MG tablet  Commonly known as: LIPITOR     BASAGLAR KWIKPEN U-100 INSULIN glargine 100 units/mL (3mL) SubQ pen  Generic drug: insulin     gabapentin 600 MG tablet  Commonly known as: NEURONTIN     HYDROcodone-acetaminophen  mg per tablet  Commonly known as: NORCO     metFORMIN 850 MG tablet  Commonly known as: GLUCOPHAGE     pantoprazole 40 MG tablet  Commonly known as: PROTONIX     prasugreL 10 mg Tab  Commonly known as: EFFIENT     SITagliptin 100 MG Tab  Commonly known as: JANUVIA     temazepam 15 mg  Cap  Commonly known as: RESTORIL     traMADoL 50 mg tablet  Commonly known as: ULTRAM     vitamin D 1000 units Tab  Commonly known as: VITAMIN D3           Where to Get Your Medications      These medications were sent to [facility]/pharmacy #[id number] - [place] [facility] - [address]  [address] [facility] [address]    Phone: [phone]   · vancomycin 125 MG capsule          Explained in detail to the patient about the discharge plan, medications, and follow-up visits. Pt understands and agrees with the treatment plan  Discharged Condition: stable  Diet: diabetic  Disposition: Home with home health    Medications Per [place] med rec  Activities as tolerated  Follow up with your PCP in 1 wks   For further questions contact hospitalist office    Discharge time 33 minutes    For worsening symptoms, chest pain, shortness of breath, increased abdominal pain, high grade fever, stroke or stroke like symptoms, immediately go to the nearest Emergency Room or call 911 as soon as possible.      Dmitri Aguayo M.D, on 6/15/2022. at 7:55 AM.

## 2022-06-15 NOTE — PLAN OF CARE
Problem: Skin Injury Risk Increased  Goal: Skin Health and Integrity  Outcome: Adequate for Care Transition     Problem: Impaired Wound Healing  Goal: Optimal Wound Healing  Outcome: Adequate for Care Transition     Problem: Infection  Goal: Absence of Infection Signs and Symptoms  Outcome: Adequate for Care Transition

## 2022-06-16 ENCOUNTER — PATIENT OUTREACH (OUTPATIENT)
Dept: ADMINISTRATIVE | Facility: CLINIC | Age: 72
End: 2022-06-16
Payer: MEDICARE

## 2022-06-16 PROBLEM — E11.621 DIABETIC ULCER OF LEFT HEEL ASSOCIATED WITH TYPE 2 DIABETES MELLITUS, LIMITED TO BREAKDOWN OF SKIN: Chronic | Status: ACTIVE | Noted: 2022-06-16

## 2022-06-16 PROBLEM — E11.42 DIABETIC POLYNEUROPATHY: Chronic | Status: RESOLVED | Noted: 2022-05-26 | Resolved: 2022-06-16

## 2022-06-16 PROBLEM — L98.429 STAGE 3 SKIN ULCER OF SACRAL REGION: Chronic | Status: ACTIVE | Noted: 2022-06-16

## 2022-06-16 PROBLEM — L97.421 DIABETIC ULCER OF LEFT HEEL ASSOCIATED WITH TYPE 2 DIABETES MELLITUS, LIMITED TO BREAKDOWN OF SKIN: Status: ACTIVE | Noted: 2022-06-16

## 2022-06-16 PROBLEM — N30.80 BACTERIAL CYSTITIS: Chronic | Status: ACTIVE | Noted: 2022-06-12

## 2022-06-16 PROBLEM — A04.72 C. DIFFICILE COLITIS: Status: ACTIVE | Noted: 2022-06-16

## 2022-06-16 PROBLEM — E11.621 DIABETIC ULCER OF LEFT HEEL ASSOCIATED WITH TYPE 2 DIABETES MELLITUS, LIMITED TO BREAKDOWN OF SKIN: Status: ACTIVE | Noted: 2022-06-16

## 2022-06-16 PROBLEM — E83.42 HYPOMAGNESEMIA: Status: ACTIVE | Noted: 2022-06-16

## 2022-06-16 PROBLEM — A04.72 C. DIFFICILE COLITIS: Chronic | Status: ACTIVE | Noted: 2022-06-16

## 2022-06-16 PROBLEM — L98.429 STAGE 3 SKIN ULCER OF SACRAL REGION: Status: ACTIVE | Noted: 2022-06-16

## 2022-06-16 PROBLEM — E83.42 HYPOMAGNESEMIA: Chronic | Status: ACTIVE | Noted: 2022-06-16

## 2022-06-16 PROBLEM — L97.421 DIABETIC ULCER OF LEFT HEEL ASSOCIATED WITH TYPE 2 DIABETES MELLITUS, LIMITED TO BREAKDOWN OF SKIN: Chronic | Status: ACTIVE | Noted: 2022-06-16

## 2022-06-16 LAB — BACTERIA STL CULT: NORMAL

## 2022-06-16 NOTE — CONSULTS
[facility] - 4th Floor Medical Telemetry  Consult Note    Patient Name: Niurka Mata  MRN: [id number]  Admission Date: 6/12/2022  Hospital Length of Stay: 3 days  Attending Physician: No att. providers found  Primary Care Provider: Roney Leal MD     History of Present Illness:  Patient is a 72-year-old white female with a history past medical history of hypertension, insulin-dependent type 2 diabetes, coronary artery disease and other medical problems who presents to the ED 6/14/22 complaining of a 2 day history of generalized abdominal pain with associated diarrhea.  No relieving factors.  Symptoms exacerbated by oral intake.  Denies nausea, vomiting, fever, chills, chest pain, shortness of breath.  Patient also reports rash under breasts and sacral pressure ulcer. Workup in the emergency room included a CT of the abdomen and pelvis without contrast which revealed a few scattered dilated loops of small bowel without an abrupt transition point which may represent an ileus ileus.  Evidence of chronic pancreatitis was also noted.  Otherwise exam was negative.  Laboratory work revealed a white blood cell count of 16,300, a bicarbonate of 18, and urinalysis positive for nitrites, leukocytes, and 6 white blood cells as well as 1+ bacteria.  Troponin I was negative.  Otherwise laboratory work was unremarkable.  Patient was given a dose of Rocephin. Further workup revealed vulvovaginal candiasis and Cdif colitis. Wound clinic consulted 6/13/22 by wound care RN for evaluation of sacrum and left heel wounds. Patient is familiar to me, I followed her during an [facility] admission last year for multiple pressure wounds, she was last seen in our clinic in February 2021 and asked to be discharged from clinic as she was nearly healed - we have not seen her since then. She lives alone predominantly chair/bed bound with a daughter who helps her. She had a TWILA bed but reports she is currently on regular bed at  home.     Today: patient seen for initial consult. At the time I was seeing patient, she already had discharge orders in place waiting on transportation home. She has been on a regular bed during admission with no offloading devices noted today. Patient eager to go home. Patient states wounds to buttocks and heel have been present for a few weeks managed by home health but she never called our clinic or notified us to be seen.       Scheduled Meds:  Continuous Infusions:  PRN Meds:    Review of patient's allergies indicates:   Allergen Reactions    Adhesive Other (See Comments)     Removes her skin.      Morphine Other (See Comments)     Makes her crazy        Past Medical History:   Diagnosis Date    Age-related nuclear cataract of both eyes 5/26/2022    Anemia 5/26/2022    Benign essential HTN 12/27/2018    Chronic cholecystitis 5/26/2022    Class 3 severe obesity due to excess calories with serious comorbidity and body mass index (BMI) of 40.0 to 44.9 in adult 12/28/2018    Closed fracture of distal end of right femur 12/24/2018    Closed fracture of right hip requiring operative repair, with routine healing, subsequent encounter 12/28/2018    Coronary artery disease involving native coronary artery of native heart without angina pectoris 5/26/2022    Diabetes mellitus type 2 in obese 12/27/2018    Diabetic polyneuropathy 5/26/2022    Erosive osteoarthritis 5/26/2022    Gastroesophageal reflux disease 5/26/2022    Hypertension 5/26/2022    Low vitamin D level 5/26/2022    Mixed hyperlipidemia 5/26/2022    Osteoporosis 12/28/2018    history of multiple fractures and arthritis history of multiple fractures and arthritis    Other hyperlipidemia 12/28/2018    Peripheral arterial disease 5/26/2022    Physical deconditioning 5/9/2020    Postmenopausal 5/26/2022    Seasonal allergic rhinitis 5/26/2022    STEMI (ST elevation myocardial infarction) 5/26/2022    Tobacco dependency 5/26/2022     No  past surgical history on file.    Family History    None       Tobacco Use    Smoking status: Not on file    Smokeless tobacco: Not on file   Substance and Sexual Activity    Alcohol use: Not on file    Drug use: Not on file    Sexual activity: Not on file     Review of Systems  Negative except as detailed in HPI, all other systems reviewed and negative.     Vital Signs (Most Recent):  Temp: 99.1 °F (37.3 °C) (06/15/22 1139)  Pulse: 78 (06/15/22 1139)  Resp: 18 (06/15/22 1139)  BP: 134/89 (06/15/22 1139)  SpO2: 97 % (06/15/22 1139) Vital Signs (24h Range):  Temp:  [98.1 °F (36.7 °C)-99.1 °F (37.3 °C)] 99.1 °F (37.3 °C)  Pulse:  [74-78] 78  Resp:  [15-20] 18  SpO2:  [96 %-99 %] 97 %  BP: (114-150)/(61-89) 134/89     Weight: 70 kg (154 lb 5.2 oz)  Body mass index is 29.52 kg/m².    Physical Exam  Constitutional:       Comments: Pleasant elderly female, NAD, regular bed   HENT:      Head: Normocephalic.   Cardiovascular:      Rate and Rhythm: Normal rate.   Pulmonary:      Effort: Pulmonary effort is normal.   Abdominal:      General: Abdomen is flat.      Palpations: Abdomen is soft.   : brief  Skin:     General: Skin is warm.      Comments: Moist skin.   Left heel wound with slough, granulation, some bruising, scant serosanginous drainage.   Sacrum with biofilm/slough, scant serosanginous drainage.  Scarring/thin skin.  Neurological:      General: No focal deficit present.      Mental Status: She is oriented to person, place, and time.   Psychiatric:         Mood and Affect: Mood normal.       Sacrum: 5.8 x 4 x 0.4 cm    Left heel: 2 x 2.8 x 0.3 cm.     Laboratory:  CBC:   Recent Labs   Lab 06/15/22  0423   WBC 7.7   RBC 3.69*   HGB 10.6*   HCT 34.1*      MCV 92.4   MCH 28.7   MCHC 31.1*     CMP:   Recent Labs   Lab 06/15/22  0423   CALCIUM 7.8*   ALBUMIN 2.0*      K 4.1   CO2 20*   BUN 4.1*   CREATININE 0.69   ALKPHOS 60   ALT 7   AST 14   BILITOT 0.2       Diagnostic  Results:  None    ASSESSMENT:   Failure to thrive  Gastroenteritis/CDif  Chronic pancreatitis per CT scan  Stage III sacral pressure ulcer POA   Left heel DFU POA  Vulvovaginal candidiasis  Type 2 insulin-dependent diabetes mellitus  Coronary artery disease  Tobacco abuse  obesity  Peripheral arterial disease  Chronic back pain with multiple chronic thoracolumbar compression fractures    PLAN:  1. Wound assessed/redressed. Did not debride any wounds as patient was actively in the process of being discharged. Do not think she can adequately care for herself at home she has a hx a difficulty with offloading and states she cannot put heel float boots on herself at home - she declined any type of placement. Spoke to case mgmt to see if TWILA bed or alternating pressure topper would be approved by insurance - did not receive word back on result. She needs home health to visit daily.   2. Wound care orders: sacrum - silver, foam QOD. Left heel - santyl, saline wet-dry, abd kerlix Qday.   3. Monitor S&S infection/deterioration  4. Offloading/Incontinence: needs TWILA bed at home. Needs to float heels off of bed  5. Nutrition: Recommend aggressive nutritional support protein supplementation as renal functions allow to support wound healing  6. glucose control per attending  7. Patient was being discharged home with DIRK   8. Follow up [facility] wound clinic June 23 @ 1:00 pm    Total time spent for evaluation and management of care 70 minutes.         RUDY Wahl  Wound Medicine  [facility] - 4th Floor Medical Telemetry

## 2022-06-16 NOTE — PROGRESS NOTES
C3 nurse attempted to contact Niurka Mata for a TCC post hospital discharge follow up call. No answer. Left voicemail with callback information. The patient does have a scheduled HOSFU appointment with Roney Leal MD 06/21/2022 @ 130 pm.

## 2022-06-16 NOTE — SUBJECTIVE & OBJECTIVE
Scheduled Meds:  Continuous Infusions:  PRN Meds:    Review of patient's allergies indicates:   Allergen Reactions    Adhesive Other (See Comments)     Removes her skin.      Morphine Other (See Comments)     Makes her crazy        Past Medical History:   Diagnosis Date    Age-related nuclear cataract of both eyes 5/26/2022    Anemia 5/26/2022    Benign essential HTN 12/27/2018    Chronic cholecystitis 5/26/2022    Class 3 severe obesity due to excess calories with serious comorbidity and body mass index (BMI) of 40.0 to 44.9 in adult 12/28/2018    Closed fracture of distal end of right femur 12/24/2018    Closed fracture of right hip requiring operative repair, with routine healing, subsequent encounter 12/28/2018    Coronary artery disease involving native coronary artery of native heart without angina pectoris 5/26/2022    Diabetes mellitus type 2 in obese 12/27/2018    Diabetic polyneuropathy 5/26/2022    Erosive osteoarthritis 5/26/2022    Gastroesophageal reflux disease 5/26/2022    Hypertension 5/26/2022    Low vitamin D level 5/26/2022    Mixed hyperlipidemia 5/26/2022    Osteoporosis 12/28/2018    history of multiple fractures and arthritis history of multiple fractures and arthritis    Other hyperlipidemia 12/28/2018    Peripheral arterial disease 5/26/2022    Physical deconditioning 5/9/2020    Postmenopausal 5/26/2022    Seasonal allergic rhinitis 5/26/2022    STEMI (ST elevation myocardial infarction) 5/26/2022    Tobacco dependency 5/26/2022     No past surgical history on file.    Family History    None       Tobacco Use    Smoking status: Not on file    Smokeless tobacco: Not on file   Substance and Sexual Activity    Alcohol use: Not on file    Drug use: Not on file    Sexual activity: Not on file     Review of Systems  Negative except as detailed in HPI, all other systems reviewed and negative.     Vital Signs (Most Recent):  Temp: 99.1 °F (37.3 °C) (06/15/22 1139)  Pulse: 78 (06/15/22 1139)  Resp:  18 (06/15/22 1139)  BP: 134/89 (06/15/22 1139)  SpO2: 97 % (06/15/22 1139) Vital Signs (24h Range):  Temp:  [98.1 °F (36.7 °C)-99.1 °F (37.3 °C)] 99.1 °F (37.3 °C)  Pulse:  [74-78] 78  Resp:  [15-20] 18  SpO2:  [96 %-99 %] 97 %  BP: (114-150)/(61-89) 134/89     Weight: 70 kg (154 lb 5.2 oz)  Body mass index is 29.52 kg/m².    Physical Exam  Constitutional:       Comments: Pleasant elderly female, NAD, regular bed   HENT:      Head: Normocephalic.   Cardiovascular:      Rate and Rhythm: Normal rate.   Pulmonary:      Effort: Pulmonary effort is normal.   Abdominal:      General: Abdomen is flat.      Palpations: Abdomen is soft.   Skin:     General: Skin is warm.      Comments: Moist skin.   Left heel wound with slough, granulation, some bruising, scant serosanginous drainage.   Sacrum with biofilm/slough, scant serosanginous drainage.    Neurological:      General: No focal deficit present.      Mental Status: She is oriented to person, place, and time.   Psychiatric:         Mood and Affect: Mood normal.       Sacrum: 5.8 x 4 x 0.4 cm    Left heel: 2 x 2.8 x 0.3 cm.     Laboratory:  CBC:   Recent Labs   Lab 06/15/22  0423   WBC 7.7   RBC 3.69*   HGB 10.6*   HCT 34.1*      MCV 92.4   MCH 28.7   MCHC 31.1*     CMP:   Recent Labs   Lab 06/15/22  0423   CALCIUM 7.8*   ALBUMIN 2.0*      K 4.1   CO2 20*   BUN 4.1*   CREATININE 0.69   ALKPHOS 60   ALT 7   AST 14   BILITOT 0.2       Diagnostic Results:  None    ASSESSMENT:   Failure to thrive  Gastroenteritis/CDif  Chronic pancreatitis per CT scan  Stage III sacral pressure ulcer POA   Left heel DFU POA  Vulvovaginal candidiasis  Type 2 insulin-dependent diabetes mellitus  Coronary artery disease  Tobacco abuse  obesity  Peripheral arterial disease  Chronic back pain with multiple chronic thoracolumbar compression fractures    PLAN:  1. Wound assessed/redressed. Did not debride any wounds as patient was actively in the process of being discharged. Do not think  she can adequately care for herself at home she has a hx a difficulty with offloading and states she cannot put heel float boots on herself at home - she declined any type of placement. Spoke to case mgmt to see if TWILA bed or alternating pressure topper would be approved by insurance - did not receive word back on result. She needs home health to visit daily.   2. Wound care orders: sacrum - silver, foam QOD. Left heel - santyl, saline wet-dry, abd kerlix Qday.   3. Monitor S&S infection/deterioration  4. Offloading/Incontinence: needs TWILA bed at home. Needs to float heels off of bed  5. Nutrition: Recommend aggressive nutritional support protein supplementation as renal functions allow to support wound healing  6. glucose control per attending  7. Patient was being discharged home with [facility]  8. Follow up [facility] wound clinic June 23 @ 1:00 pm    Total time spent for evaluation and management of care 70 minutes.

## 2022-06-16 NOTE — HPI
Patient is a 72-year-old white female with a history past medical history of hypertension, insulin-dependent type 2 diabetes, coronary artery disease and other medical problems who presents to the ED 6/14/22 complaining of a 2 day history of generalized abdominal pain with associated diarrhea.  No relieving factors.  Symptoms exacerbated by oral intake.  Denies nausea, vomiting, fever, chills, chest pain, shortness of breath.  Patient also reports rash under breasts and sacral pressure ulcer. Workup in the emergency room included a CT of the abdomen and pelvis without contrast which revealed a few scattered dilated loops of small bowel without an abrupt transition point which may represent an ileus ileus.  Evidence of chronic pancreatitis was also noted.  Otherwise exam was negative.  Laboratory work revealed a white blood cell count of 16,300, a bicarbonate of 18, and urinalysis positive for nitrites, leukocytes, and 6 white blood cells as well as 1+ bacteria.  Troponin I was negative.  Otherwise laboratory work was unremarkable.  Patient was given a dose of Rocephin. Further workup revealed vulvovaginal candiasis and Cdif colitis. Wound clinic consulted 6/13/22 by wound care RN for evaluation of sacrum and left heel wounds. Patient is familiar to me, I followed her during an [facility] admission last year for multiple pressure wounds, she was last seen in our clinic in February 2021 and asked to be discharged from clinic as she was nearly healed - we have not seen her since then. She lives alone predominantly chair/bed bound with a daughter who helps her. She had a TWILA bed but reports she is currently on regular bed at home.     Today: patient seen for initial consult. At the time I was seeing patient, she already had discharge orders in place waiting on transportation home. She has been on a regular bed during admission with no offloading devices noted today. Patient eager to go home. Patient states wounds to buttocks  and heel have been present for a few weeks managed by home health but she never called our clinic or notified us to be seen.

## 2022-06-17 ENCOUNTER — HOSPITAL ENCOUNTER (EMERGENCY)
Facility: HOSPITAL | Age: 72
Discharge: LEFT BEFORE DISCHARGE | End: 2022-06-19
Payer: MEDICARE

## 2022-06-17 VITALS
OXYGEN SATURATION: 98 % | HEART RATE: 115 BPM | TEMPERATURE: 99 F | DIASTOLIC BLOOD PRESSURE: 86 MMHG | SYSTOLIC BLOOD PRESSURE: 129 MMHG | RESPIRATION RATE: 20 BRPM

## 2022-06-17 LAB
ALBUMIN SERPL-MCNC: 2.6 GM/DL (ref 3.4–4.8)
ALBUMIN/GLOB SERPL: 0.8 RATIO (ref 1.1–2)
ALP SERPL-CCNC: 72 UNIT/L (ref 40–150)
ALT SERPL-CCNC: 8 UNIT/L (ref 0–55)
AST SERPL-CCNC: 13 UNIT/L (ref 5–34)
BASOPHILS # BLD AUTO: 0.06 X10(3)/MCL (ref 0–0.2)
BASOPHILS NFR BLD AUTO: 0.5 %
BILIRUBIN DIRECT+TOT PNL SERPL-MCNC: 0.4 MG/DL
BUN SERPL-MCNC: 4.5 MG/DL (ref 9.8–20.1)
CALCIUM SERPL-MCNC: 8.5 MG/DL (ref 8.4–10.2)
CHLORIDE SERPL-SCNC: 104 MMOL/L (ref 98–107)
CO2 SERPL-SCNC: 22 MMOL/L (ref 23–31)
CREAT SERPL-MCNC: 0.68 MG/DL (ref 0.55–1.02)
EOSINOPHIL # BLD AUTO: 0.06 X10(3)/MCL (ref 0–0.9)
EOSINOPHIL NFR BLD AUTO: 0.5 %
ERYTHROCYTE [DISTWIDTH] IN BLOOD BY AUTOMATED COUNT: 15.9 % (ref 11.5–17)
GLOBULIN SER-MCNC: 3.1 GM/DL (ref 2.4–3.5)
GLUCOSE SERPL-MCNC: 131 MG/DL (ref 82–115)
HCT VFR BLD AUTO: 37 % (ref 37–47)
HGB BLD-MCNC: 12.3 GM/DL (ref 12–16)
IMM GRANULOCYTES # BLD AUTO: 0.08 X10(3)/MCL (ref 0–0.02)
IMM GRANULOCYTES NFR BLD AUTO: 0.6 % (ref 0–0.43)
LIPASE SERPL-CCNC: 10 U/L
LYMPHOCYTES # BLD AUTO: 2.76 X10(3)/MCL (ref 0.6–4.6)
LYMPHOCYTES NFR BLD AUTO: 20.9 %
MCH RBC QN AUTO: 28 PG (ref 27–31)
MCHC RBC AUTO-ENTMCNC: 33.2 MG/DL (ref 33–36)
MCV RBC AUTO: 84.1 FL (ref 80–94)
MONOCYTES # BLD AUTO: 0.86 X10(3)/MCL (ref 0.1–1.3)
MONOCYTES NFR BLD AUTO: 6.5 %
NEUTROPHILS # BLD AUTO: 9.4 X10(3)/MCL (ref 2.1–9.2)
NEUTROPHILS NFR BLD AUTO: 71 %
NRBC BLD AUTO-RTO: 0 %
PLATELET # BLD AUTO: 402 X10(3)/MCL (ref 130–400)
PMV BLD AUTO: 9.3 FL (ref 9.4–12.4)
POTASSIUM SERPL-SCNC: 3.8 MMOL/L (ref 3.5–5.1)
PROT SERPL-MCNC: 5.7 GM/DL (ref 5.8–7.6)
RBC # BLD AUTO: 4.4 X10(6)/MCL (ref 4.2–5.4)
SODIUM SERPL-SCNC: 136 MMOL/L (ref 136–145)
WBC # SPEC AUTO: 13.2 X10(3)/MCL (ref 4.5–11.5)

## 2022-06-17 PROCEDURE — 80053 COMPREHEN METABOLIC PANEL: CPT | Performed by: PHYSICIAN ASSISTANT

## 2022-06-17 PROCEDURE — 85025 COMPLETE CBC W/AUTO DIFF WBC: CPT | Performed by: PHYSICIAN ASSISTANT

## 2022-06-17 PROCEDURE — 99283 EMERGENCY DEPT VISIT LOW MDM: CPT | Mod: 25

## 2022-06-17 PROCEDURE — 83690 ASSAY OF LIPASE: CPT | Performed by: PHYSICIAN ASSISTANT

## 2022-06-17 PROCEDURE — 36415 COLL VENOUS BLD VENIPUNCTURE: CPT | Performed by: PHYSICIAN ASSISTANT

## 2022-06-17 NOTE — PROGRESS NOTES
C3 nurse attempted to contact Niurka Mata for a TCC post hospital discharge follow up call. No answer. Left voicemail with callback information. The patient does have a scheduled HOSFU appointment with Roney Leal MD 06/21/2022 @ 130 pm.  Spoke with daughterNathaly and she will send her mother a text to make her aware call if for a follow up.

## 2022-06-17 NOTE — PROGRESS NOTES
C3 nurse attempted to contact Niurka Mata for a TCC post hospital discharge follow up call. No answer. Left voicemail with callback information. The patient does have a scheduled HOSFU appointment with Roney Leal MD 06/21/2022 @ 130 pm

## 2022-06-17 NOTE — FIRST PROVIDER EVALUATION
Medical screening exam completed.  I have conducted a focused provider triage encounter, findings are as follows:    Brief history of present illness: 72-year-old female presents to ED for worsening abdominal pain; patient reports she was recently diagnosed with c diff 2 days ago     Vitals:    06/17/22 1753   BP: (!) 166/68   Pulse: 103   Resp: 18   Temp: 98.6 °F (37 °C)   TempSrc: Oral   SpO2: 98%       Pertinent physical exam:  Awake, alert     Brief workup plan:  CBC, CMP, Lipase, UA     Preliminary workup initiated; this workup will be continued and followed by the physician or advanced practice provider that is assigned to the patient when roomed.

## 2022-06-18 RX ORDER — ACETAMINOPHEN 500 MG
1000 TABLET ORAL
Status: ACTIVE | OUTPATIENT
Start: 2022-06-18 | End: 2022-06-18

## 2022-06-18 NOTE — ED PROVIDER NOTES
Encounter Date: 6/17/2022    SCRIBE #1 NOTE: I, Lauren Jarrell , am scribing for, and in the presence of,  Jonah French MD. I have scribed the following portions of the note - Other sections scribed: HPI, PE.       History     Chief Complaint   Patient presents with    Abdominal Pain     Pt to ER via [facility] for abd pain.  Pt recently admitted for c-diff and dc'd 2 days ago, not feeling better     Due to unavailable beds; we're doing [facility] updates, going over lab work, and reviewing patient's current symptoms at [facility] prior to being returned to [facility]. 72-year-old female with history of C. diff c/o diarrhea extending up back, states pain is 10/10. Pt is on vancomycin. She denies fever.     The history is provided by the patient. No  was used.     Review of patient's allergies indicates:   Allergen Reactions    Adhesive Other (See Comments)     Removes her skin.      Morphine Other (See Comments)     Makes her crazy     Past Medical History:   Diagnosis Date    Age-related nuclear cataract of both eyes 5/26/2022    Anemia 5/26/2022    Benign essential HTN 12/27/2018    C. difficile colitis 6/16/2022    Chronic cholecystitis 5/26/2022    Class 3 severe obesity due to excess calories with serious comorbidity and body mass index (BMI) of 40.0 to 44.9 in adult 12/28/2018    Closed fracture of distal end of right femur 12/24/2018    Closed fracture of right hip requiring operative repair, with routine healing, subsequent encounter 12/28/2018    Coronary artery disease involving native coronary artery of native heart without angina pectoris 5/26/2022    Diabetes mellitus type 2 in obese 12/27/2018    Diabetic polyneuropathy 5/26/2022    Diabetic ulcer of left heel associated with type 2 diabetes mellitus, limited to breakdown of skin 6/16/2022    Erosive osteoarthritis 5/26/2022    Gastroesophageal reflux disease 5/26/2022    Hypertension 5/26/2022    Hypomagnesemia 6/16/2022    Low  vitamin D level 5/26/2022    Mixed hyperlipidemia 5/26/2022    Osteoporosis 12/28/2018    history of multiple fractures and arthritis history of multiple fractures and arthritis    Other hyperlipidemia 12/28/2018    Peripheral arterial disease 5/26/2022    Physical deconditioning 5/9/2020    Postmenopausal 5/26/2022    Seasonal allergic rhinitis 5/26/2022    Stage 3 skin ulcer of sacral region 6/16/2022    STEMI (ST elevation myocardial infarction) 5/26/2022    Tobacco dependency 5/26/2022     No past surgical history on file.  No family history on file.     Review of Systems   Constitutional: Negative for chills, diaphoresis, fatigue and fever.   HENT: Negative for congestion and trouble swallowing.    Eyes: Negative for pain and discharge.   Respiratory: Negative for cough and wheezing.    Cardiovascular: Negative for chest pain and leg swelling.   Gastrointestinal: Positive for diarrhea. Negative for nausea and vomiting.   Genitourinary: Negative for dysuria, flank pain, vaginal bleeding and vaginal discharge.   Musculoskeletal: Negative for arthralgias.   Skin: Negative for color change.   Neurological: Negative for syncope, speech difficulty and weakness.   Psychiatric/Behavioral: Negative for agitation and confusion.   All other systems reviewed and are negative.      Physical Exam     Initial Vitals [06/17/22 1753]   BP Pulse Resp Temp SpO2   (!) 166/68 103 18 98.6 °F (37 °C) 98 %      MAP       --         Physical Exam    Constitutional:   Well-appearing   HENT:   Head: Normocephalic.   Eyes: EOM are normal. Left eye exhibits no discharge. No scleral icterus.   Cardiovascular: Regular rhythm.   Slightly tachycardic   Pulmonary/Chest: No stridor. No respiratory distress.   Abdominal: She exhibits no distension.   Benign abdomen    Musculoskeletal:         General: Normal range of motion.     Neurological: She is alert and oriented to person, place, and time. She has normal strength.   Skin: Skin is  "dry. No rash noted. No erythema. No pallor.   Psychiatric: She has a normal mood and affect. Her behavior is normal. Judgment and thought content normal.         ED Course   Procedures  Labs Reviewed   COMPREHENSIVE METABOLIC PANEL - Abnormal; Notable for the following components:       Result Value    Carbon Dioxide 22 (*)     Glucose Level 131 (*)     Blood Urea Nitrogen 4.5 (*)     Protein Total 5.7 (*)     Albumin Level 2.6 (*)     Albumin/Globulin Ratio 0.8 (*)     All other components within normal limits   CBC WITH DIFFERENTIAL - Abnormal; Notable for the following components:    WBC 13.2 (*)     Platelet 402 (*)     MPV 9.3 (*)     Neut # 9.4 (*)     IG# 0.08 (*)     IG% 0.6 (*)     All other components within normal limits   LIPASE - Normal   CBC W/ AUTO DIFFERENTIAL    Narrative:     The following orders were created for panel order CBC Auto Differential.  Procedure                               Abnormality         Status                     ---------                               -----------         ------                     CBC with Differential[[id number]]        Abnormal            Final result                 Please view results for these tests on the individual orders.   URINALYSIS, REFLEX TO URINE CULTURE          Imaging Results    None          Medications - No data to display           Scribe Attestation:   Scribe #1: I performed the above scribed service and the documentation accurately describes the services I performed. I attest to the accuracy of the note.    Attending Attestation:           Physician Attestation for Scribe:  Physician Attestation Statement for Scribe #1: I,  Jonah French MD, reviewed documentation, as scribed by Lauren Jarrell  in my presence, and it is both accurate and complete.                      Clinical Impression:    ***Please document a Clinical Impression and click the "Refresh" button to refresh your note and automatically pull in before signing.***         "

## 2022-06-20 ENCOUNTER — HOSPITAL ENCOUNTER (EMERGENCY)
Facility: HOSPITAL | Age: 72
Discharge: HOME | End: 2022-06-20
Attending: INTERNAL MEDICINE
Payer: MEDICARE

## 2022-06-20 VITALS
BODY MASS INDEX: 30.43 KG/M2 | DIASTOLIC BLOOD PRESSURE: 78 MMHG | RESPIRATION RATE: 18 BRPM | HEIGHT: 60 IN | OXYGEN SATURATION: 98 % | HEART RATE: 90 BPM | WEIGHT: 155 LBS | SYSTOLIC BLOOD PRESSURE: 159 MMHG

## 2022-06-20 DIAGNOSIS — Z86.19 HISTORY OF CLOSTRIDIOIDES DIFFICILE COLITIS: ICD-10-CM

## 2022-06-20 DIAGNOSIS — A04.72 C. DIFFICILE COLITIS: Chronic | ICD-10-CM

## 2022-06-20 DIAGNOSIS — K86.1 CHRONIC PANCREATITIS, UNSPECIFIED PANCREATITIS TYPE: ICD-10-CM

## 2022-06-20 DIAGNOSIS — E83.42 HYPOMAGNESEMIA: ICD-10-CM

## 2022-06-20 DIAGNOSIS — K29.70 GASTRITIS WITHOUT BLEEDING, UNSPECIFIED CHRONICITY, UNSPECIFIED GASTRITIS TYPE: Primary | ICD-10-CM

## 2022-06-20 LAB
ALBUMIN SERPL-MCNC: 2.6 GM/DL (ref 3.4–4.8)
ALBUMIN/GLOB SERPL: 0.7 RATIO (ref 1.1–2)
ALP SERPL-CCNC: 68 UNIT/L (ref 40–150)
ALT SERPL-CCNC: 8 UNIT/L (ref 0–55)
APTT PPP: 20.8 SECONDS (ref 23.4–33.9)
AST SERPL-CCNC: 17 UNIT/L (ref 5–34)
BASOPHILS # BLD AUTO: 0.06 X10(3)/MCL (ref 0–0.2)
BASOPHILS NFR BLD AUTO: 0.6 %
BILIRUBIN DIRECT+TOT PNL SERPL-MCNC: 0.4 MG/DL
BUN SERPL-MCNC: 7.5 MG/DL (ref 9.8–20.1)
CALCIUM SERPL-MCNC: 8.8 MG/DL (ref 8.4–10.2)
CHLORIDE SERPL-SCNC: 104 MMOL/L (ref 98–107)
CO2 SERPL-SCNC: 25 MMOL/L (ref 23–31)
CREAT SERPL-MCNC: 0.7 MG/DL (ref 0.55–1.02)
EOSINOPHIL # BLD AUTO: 0.05 X10(3)/MCL (ref 0–0.9)
EOSINOPHIL NFR BLD AUTO: 0.5 %
ERYTHROCYTE [DISTWIDTH] IN BLOOD BY AUTOMATED COUNT: 16 % (ref 11.5–17)
ETHANOL SERPL-MCNC: 12 MG/DL
GLOBULIN SER-MCNC: 3.7 GM/DL (ref 2.4–3.5)
GLUCOSE SERPL-MCNC: 124 MG/DL (ref 82–115)
HCT VFR BLD AUTO: 36.4 % (ref 37–47)
HGB BLD-MCNC: 12 GM/DL (ref 12–16)
IMM GRANULOCYTES # BLD AUTO: 0.04 X10(3)/MCL (ref 0–0.02)
IMM GRANULOCYTES NFR BLD AUTO: 0.4 % (ref 0–0.43)
INR BLD: 1.05 (ref 2–3)
LIPASE SERPL-CCNC: 10 U/L
LYMPHOCYTES # BLD AUTO: 2.07 X10(3)/MCL (ref 0.6–4.6)
LYMPHOCYTES NFR BLD AUTO: 20.7 %
MAGNESIUM SERPL-MCNC: 1.5 MG/DL (ref 1.6–2.6)
MCH RBC QN AUTO: 28.1 PG (ref 27–31)
MCHC RBC AUTO-ENTMCNC: 33 MG/DL (ref 33–36)
MCV RBC AUTO: 85.2 FL (ref 80–94)
MONOCYTES # BLD AUTO: 0.63 X10(3)/MCL (ref 0.1–1.3)
MONOCYTES NFR BLD AUTO: 6.3 %
NEUTROPHILS # BLD AUTO: 7.2 X10(3)/MCL (ref 2.1–9.2)
NEUTROPHILS NFR BLD AUTO: 71.5 %
NRBC BLD AUTO-RTO: 0 %
PLATELET # BLD AUTO: 341 X10(3)/MCL (ref 130–400)
PMV BLD AUTO: 9.1 FL (ref 9.4–12.4)
POTASSIUM SERPL-SCNC: 3.7 MMOL/L (ref 3.5–5.1)
PROT SERPL-MCNC: 6.3 GM/DL (ref 5.8–7.6)
PROTHROMBIN TIME: 13.5 SECONDS (ref 11.7–14.5)
RBC # BLD AUTO: 4.27 X10(6)/MCL (ref 4.2–5.4)
SODIUM SERPL-SCNC: 141 MMOL/L (ref 136–145)
WBC # SPEC AUTO: 10 X10(3)/MCL (ref 4.5–11.5)

## 2022-06-20 PROCEDURE — 83735 ASSAY OF MAGNESIUM: CPT | Performed by: INTERNAL MEDICINE

## 2022-06-20 PROCEDURE — 36415 COLL VENOUS BLD VENIPUNCTURE: CPT | Performed by: INTERNAL MEDICINE

## 2022-06-20 PROCEDURE — 63600175 PHARM REV CODE 636 W HCPCS: Performed by: INTERNAL MEDICINE

## 2022-06-20 PROCEDURE — 85730 THROMBOPLASTIN TIME PARTIAL: CPT | Performed by: INTERNAL MEDICINE

## 2022-06-20 PROCEDURE — 82077 ASSAY SPEC XCP UR&BREATH IA: CPT | Performed by: INTERNAL MEDICINE

## 2022-06-20 PROCEDURE — 83690 ASSAY OF LIPASE: CPT | Performed by: INTERNAL MEDICINE

## 2022-06-20 PROCEDURE — 80053 COMPREHEN METABOLIC PANEL: CPT | Performed by: INTERNAL MEDICINE

## 2022-06-20 PROCEDURE — 85610 PROTHROMBIN TIME: CPT | Performed by: INTERNAL MEDICINE

## 2022-06-20 PROCEDURE — 96360 HYDRATION IV INFUSION INIT: CPT

## 2022-06-20 PROCEDURE — 85025 COMPLETE CBC W/AUTO DIFF WBC: CPT | Performed by: INTERNAL MEDICINE

## 2022-06-20 PROCEDURE — 99285 EMERGENCY DEPT VISIT HI MDM: CPT | Mod: 25

## 2022-06-20 PROCEDURE — 25000003 PHARM REV CODE 250: Performed by: INTERNAL MEDICINE

## 2022-06-20 RX ORDER — SODIUM CHLORIDE, SODIUM LACTATE, POTASSIUM CHLORIDE, CALCIUM CHLORIDE 600; 310; 30; 20 MG/100ML; MG/100ML; MG/100ML; MG/100ML
1000 INJECTION, SOLUTION INTRAVENOUS CONTINUOUS
Status: DISCONTINUED | OUTPATIENT
Start: 2022-06-20 | End: 2022-06-20

## 2022-06-20 RX ORDER — DICYCLOMINE HYDROCHLORIDE 10 MG/1
10 CAPSULE ORAL
Qty: 28 CAPSULE | Refills: 0 | Status: SHIPPED | OUTPATIENT
Start: 2022-06-20 | End: 2022-06-27

## 2022-06-20 RX ORDER — ONDANSETRON 4 MG/1
4 TABLET, ORALLY DISINTEGRATING ORAL ONCE
Status: COMPLETED | OUTPATIENT
Start: 2022-06-20 | End: 2022-06-20

## 2022-06-20 RX ORDER — SUCRALFATE 1 G/1
1 TABLET ORAL ONCE
Status: COMPLETED | OUTPATIENT
Start: 2022-06-20 | End: 2022-06-20

## 2022-06-20 RX ORDER — LIDOCAINE HYDROCHLORIDE 20 MG/ML
10 SOLUTION OROPHARYNGEAL ONCE
Status: COMPLETED | OUTPATIENT
Start: 2022-06-20 | End: 2022-06-20

## 2022-06-20 RX ORDER — MAG HYDROX/ALUMINUM HYD/SIMETH 200-200-20
30 SUSPENSION, ORAL (FINAL DOSE FORM) ORAL ONCE
Status: COMPLETED | OUTPATIENT
Start: 2022-06-20 | End: 2022-06-20

## 2022-06-20 RX ORDER — HYDROCODONE BITARTRATE AND ACETAMINOPHEN 10; 325 MG/1; MG/1
1 TABLET ORAL EVERY 6 HOURS PRN
Qty: 20 TABLET | Refills: 0 | Status: SHIPPED | OUTPATIENT
Start: 2022-06-20 | End: 2022-06-25

## 2022-06-20 RX ORDER — LANOLIN ALCOHOL/MO/W.PET/CERES
800 CREAM (GRAM) TOPICAL ONCE
Status: COMPLETED | OUTPATIENT
Start: 2022-06-20 | End: 2022-06-20

## 2022-06-20 RX ORDER — ONDANSETRON 4 MG/1
4 TABLET, ORALLY DISINTEGRATING ORAL EVERY 6 HOURS PRN
Qty: 15 TABLET | Refills: 0 | Status: SHIPPED | OUTPATIENT
Start: 2022-06-20

## 2022-06-20 RX ADMIN — SUCRALFATE 1 G: 1 TABLET ORAL at 12:06

## 2022-06-20 RX ADMIN — ONDANSETRON 4 MG: 4 TABLET, ORALLY DISINTEGRATING ORAL at 12:06

## 2022-06-20 RX ADMIN — SODIUM CHLORIDE, POTASSIUM CHLORIDE, SODIUM LACTATE AND CALCIUM CHLORIDE 1000 ML: 600; 310; 30; 20 INJECTION, SOLUTION INTRAVENOUS at 12:06

## 2022-06-20 RX ADMIN — MAGNESIUM OXIDE 800 MG: 400 TABLET ORAL at 01:06

## 2022-06-20 RX ADMIN — ALUMINUM HYDROXIDE, MAGNESIUM HYDROXIDE, AND SIMETHICONE 30 ML: 200; 200; 20 SUSPENSION ORAL at 12:06

## 2022-06-20 RX ADMIN — LIDOCAINE HYDROCHLORIDE 10 ML: 20 SOLUTION ORAL; TOPICAL at 01:06

## 2022-06-20 NOTE — ED PROVIDER NOTES
Source of History:  Patient, no limitations    Chief complaint:  Abdominal Pain (PT C/O OF ABD PAIN HX pancreatitis x 2 weeks ago, checked in at main , but was not seen so left after 6 hours of waiting. )      HPI:  Niurka Mata is a 72 y.o. female presenting with Abdominal Pain (PT C/O OF ABD PAIN HX pancreatitis x 2 weeks ago, checked in at main , but was not seen so left after 6 hours of waiting. )       73 yo F with Hx of DM2, HTN, CAD, chronic pancreatitis, D diff and on PO vancomycin, presents with upper abdominal pain and nausea, no vomiting, pain does not radiate, says that she feels like this is pancreatitis again and wants it rechecked was checked, says that her diarrhea due to C diff has been much improved with treatment        Review of Systems   Constitutional symptoms:  Negative except as documented in HPI.   Skin symptoms:  Negative except as documented in HPI.   HEENT symptoms:  Negative except as documented in HPI.   Respiratory symptoms:  Negative except as documented in HPI.   Cardiovascular symptoms:  Negative except as documented in HPI.   Gastrointestinal symptoms:  Negative except as documented in HPI.    Genitourinary symptoms:  Negative except as documented in HPI.   Musculoskeletal symptoms:  Negative except as documented in HPI.   Neurologic symptoms:  Negative except as documented in HPI.   Psychiatric symptoms:  Negative except as documented in HPI.   Allergy/immunologic symptoms:  Negative except as documented in HPI.             Additional review of systems information: All other systems reviewed and otherwise negative.      Review of patient's allergies indicates:   Allergen Reactions    Adhesive Other (See Comments)     Removes her skin.      Morphine Other (See Comments)     Makes her crazy       PMH:  As per HPI and below:    Past Medical History:   Diagnosis Date    Age-related nuclear cataract of both eyes 5/26/2022    Anemia 5/26/2022    Benign essential HTN  12/27/2018    C. difficile colitis 6/16/2022    Chronic cholecystitis 5/26/2022    Class 3 severe obesity due to excess calories with serious comorbidity and body mass index (BMI) of 40.0 to 44.9 in adult 12/28/2018    Closed fracture of distal end of right femur 12/24/2018    Closed fracture of right hip requiring operative repair, with routine healing, subsequent encounter 12/28/2018    Coronary artery disease involving native coronary artery of native heart without angina pectoris 5/26/2022    Diabetes mellitus type 2 in obese 12/27/2018    Diabetic polyneuropathy 5/26/2022    Diabetic ulcer of left heel associated with type 2 diabetes mellitus, limited to breakdown of skin 6/16/2022    Erosive osteoarthritis 5/26/2022    Gastroesophageal reflux disease 5/26/2022    Hypertension 5/26/2022    Hypomagnesemia 6/16/2022    Low vitamin D level 5/26/2022    Mixed hyperlipidemia 5/26/2022    Osteoporosis 12/28/2018    history of multiple fractures and arthritis history of multiple fractures and arthritis    Other hyperlipidemia 12/28/2018    Peripheral arterial disease 5/26/2022    Physical deconditioning 5/9/2020    Postmenopausal 5/26/2022    Seasonal allergic rhinitis 5/26/2022    Stage 3 skin ulcer of sacral region 6/16/2022    STEMI (ST elevation myocardial infarction) 5/26/2022    Tobacco dependency 5/26/2022        History reviewed. No pertinent family history.    History reviewed. No pertinent surgical history.         Patient Active Problem List   Diagnosis    Class 3 severe obesity due to excess calories with serious comorbidity and body mass index (BMI) of 40.0 to 44.9 in adult    Age-related nuclear cataract of both eyes    Postmenopausal    Tobacco dependency    Anemia    Erosive osteoarthritis    Peripheral arterial disease    Benign essential HTN    Closed fracture of distal end of right femur    Chronic cholecystitis    Diabetes mellitus type 2 in obese    Diabetic  polyneuropathy associated with type 2 diabetes mellitus    Gastroesophageal reflux disease    Low vitamin D level    Osteoporosis    Mixed hyperlipidemia    Physical deconditioning    Seasonal allergic rhinitis    STEMI (ST elevation myocardial infarction)    Coronary artery disease involving native coronary artery of native heart without angina pectoris    Bacterial cystitis    Diabetic ulcer of left heel associated with type 2 diabetes mellitus, limited to breakdown of skin    Stage 3 skin ulcer of sacral region    C. difficile colitis    Hypomagnesemia        Physical Exam:    BP (!) 155/81   Pulse 86   Resp 18   Ht 5' (1.524 m)   Wt 70.3 kg (155 lb)   SpO2 98%   BMI 30.27 kg/m²     Nursing note and vital signs reviewed.    General:  Alert, ill appearing  Skin: Normal for Ethnic Origin, No cyanosis  Eye: Vision unchanged, Pupils symmetric, No icterus   Cardiovascular:  Regular rate and rhythm  Chest Wall: No deformity, equal chest rise  Respiratory:  Lungs are clear to auscultation, respirations are non-labored, shallow respirations  Musculoskeletal:  No deformity, Normal perfusion to all extremities  : No suprapubic pain, No CVA tenderness  Gastrointestinal:  Soft, mild generalized tenderness, Non distended, hyperactive BS    Neurological:  Alert and oriented to person, place, time, and situation, normal motor observed, normal speech observed.    Psychiatric:  Cooperative, appropriate mood & affect.        Labs that have been ordered have been independently reviewed and interpreted by myself.     Old Chart Reviewed.      Initial Impression/ Differential Dx:  GERD, intestinal spasm, gastroenteritis, gastritis, ulcer, cholecystitis, cholelithiasis, gallstones, pancreatitis, ileus, small bowel obstruction, appendicitis, diverticulitis, colitis, constipation, intestinal gas pain.      MDM:      Reviewed Nurses Note.    Reviewed Pertinent old records.    Orders Placed This Encounter    CT  Abdomen Pelvis  Without Contrast    CBC Auto Differential    Comprehensive Metabolic Panel    Protime-INR    APTT    Lipase    Magnesium    CBC with Differential    Ethanol    Urinalysis, Reflex to Urine Culture Urine, Clean Catch    Ambulatory referral/consult to Outpatient Case Management    Insert peripheral IV    ondansetron disintegrating tablet 4 mg    sucralfate tablet 1 g    AND Linked Order Group     aluminum-magnesium hydroxide-simethicone 200-200-20 mg/5 mL suspension 30 mL     LIDOcaine HCl 2% oral solution 10 mL    magnesium oxide tablet 800 mg    lactated ringers bolus 1,000 mL    HYDROcodone-acetaminophen (NORCO)  mg per tablet    dicyclomine (BENTYL) 10 MG capsule    ondansetron (ZOFRAN-ODT) 4 MG TbDL                    Labs Reviewed   COMPREHENSIVE METABOLIC PANEL - Abnormal; Notable for the following components:       Result Value    Glucose Level 124 (*)     Blood Urea Nitrogen 7.5 (*)     Albumin Level 2.6 (*)     Globulin 3.7 (*)     Albumin/Globulin Ratio 0.7 (*)     All other components within normal limits   PROTIME-INR - Abnormal; Notable for the following components:    INR 1.05 (*)     All other components within normal limits   APTT - Abnormal; Notable for the following components:    PTT 20.8 (*)     All other components within normal limits   MAGNESIUM - Abnormal; Notable for the following components:    Magnesium Level 1.50 (*)     All other components within normal limits   CBC WITH DIFFERENTIAL - Abnormal; Notable for the following components:    Hct 36.4 (*)     MPV 9.1 (*)     IG# 0.04 (*)     All other components within normal limits   ALCOHOL,MEDICAL (ETHANOL) - Abnormal; Notable for the following components:    Ethanol Level 12.0 (*)     All other components within normal limits   LIPASE - Normal   CBC W/ AUTO DIFFERENTIAL    Narrative:     The following orders were created for panel order CBC Auto Differential.  Procedure                                Abnormality         Status                     ---------                               -----------         ------                     CBC with Differential[[id number]]        Abnormal            Final result                 Please view results for these tests on the individual orders.   URINALYSIS, REFLEX TO URINE CULTURE          CT Abdomen Pelvis  Without Contrast   Final Result      Mildly limited assessment with no acute process identified.         Electronically signed by: Rico Maria   Date:    06/20/2022   Time:    13:27           Admission on 06/20/2022   Component Date Value Ref Range Status    Sodium Level 06/20/2022 141  136 - 145 mmol/L Final    Potassium Level 06/20/2022 3.7  3.5 - 5.1 mmol/L Final    Chloride 06/20/2022 104  98 - 107 mmol/L Final    Carbon Dioxide 06/20/2022 25  23 - 31 mmol/L Final    Glucose Level 06/20/2022 124 (A) 82 - 115 mg/dL Final    Blood Urea Nitrogen 06/20/2022 7.5 (A) 9.8 - 20.1 mg/dL Final    Creatinine 06/20/2022 0.70  0.55 - 1.02 mg/dL Final    Calcium Level Total 06/20/2022 8.8  8.4 - 10.2 mg/dL Final    Protein Total 06/20/2022 6.3  5.8 - 7.6 gm/dL Final    Albumin Level 06/20/2022 2.6 (A) 3.4 - 4.8 gm/dL Final    Globulin 06/20/2022 3.7 (A) 2.4 - 3.5 gm/dL Final    Albumin/Globulin Ratio 06/20/2022 0.7 (A) 1.1 - 2.0 ratio Final    Bilirubin Total 06/20/2022 0.4  <=1.5 mg/dL Final    Alkaline Phosphatase 06/20/2022 68  40 - 150 unit/L Final    Alanine Aminotransferase 06/20/2022 8  0 - 55 unit/L Final    Aspartate Aminotransferase 06/20/2022 17  5 - 34 unit/L Final    Estimated GFR-Non  06/20/2022 >60  mls/min/1.73/m2 Final    PT 06/20/2022 13.5  11.7 - 14.5 seconds Final    INR 06/20/2022 1.05 (A) 2.00 - 3.00 Final    PTT 06/20/2022 20.8 (A) 23.4 - 33.9 seconds Final    Lipase Level 06/20/2022 10  <=60 U/L Final    Magnesium Level 06/20/2022 1.50 (A) 1.60 - 2.60 mg/dL Final    WBC 06/20/2022 10.0  4.5 - 11.5 x10(3)/mcL Final     RBC 06/20/2022 4.27  4.20 - 5.40 x10(6)/mcL Final    Hgb 06/20/2022 12.0  12.0 - 16.0 gm/dL Final    Hct 06/20/2022 36.4 (A) 37.0 - 47.0 % Final    MCV 06/20/2022 85.2  80.0 - 94.0 fL Final    MCH 06/20/2022 28.1  27.0 - 31.0 pg Final    MCHC 06/20/2022 33.0  33.0 - 36.0 mg/dL Final    RDW 06/20/2022 16.0  11.5 - 17.0 % Final    Platelet 06/20/2022 341  130 - 400 x10(3)/mcL Final    MPV 06/20/2022 9.1 (A) 9.4 - 12.4 fL Final    Neut % 06/20/2022 71.5  % Final    Lymph % 06/20/2022 20.7  % Final    Mono % 06/20/2022 6.3  % Final    Eos % 06/20/2022 0.5  % Final    Basophil % 06/20/2022 0.6  % Final    Lymph # 06/20/2022 2.07  0.6 - 4.6 x10(3)/mcL Final    Neut # 06/20/2022 7.2  2.1 - 9.2 x10(3)/mcL Final    Mono # 06/20/2022 0.63  0.1 - 1.3 x10(3)/mcL Final    Eos # 06/20/2022 0.05  0 - 0.9 x10(3)/mcL Final    Baso # 06/20/2022 0.06  0 - 0.2 x10(3)/mcL Final    IG# 06/20/2022 0.04 (A) 0 - 0.0155 x10(3)/mcL Final    IG% 06/20/2022 0.4  0 - 0.43 % Final    NRBC% 06/20/2022 0.0  % Final    Ethanol Level 06/20/2022 12.0 (A) <=10.0 mg/dL Final       Imaging Results          CT Abdomen Pelvis  Without Contrast (Final result)  Result time 06/20/22 13:27:48    Final result by Rico Maria MD (06/20/22 13:27:48)                 Impression:      Mildly limited assessment with no acute process identified.      Electronically signed by: Rico Maria  Date:    06/20/2022  Time:    13:27             Narrative:    EXAMINATION:  CT ABDOMEN PELVIS WITHOUT CONTRAST    CLINICAL HISTORY:  Abdominal pain, acute, nonlocalized;    TECHNIQUE:  CT imaging of the abdomen and pelvis without intravenous contrast. Axial, coronal and sagittal reformatted images reviewed. Dose length product is 320 mGycm. Automatic exposure control, adjustment of mA/kV or iterative reconstruction technique used to limit radiation dose.    COMPARISON:  CT 06/12/2022    FINDINGS:  Assessment of the visceral organs and vasculature is  limited by the lack of IV contrast.  Evaluation also mildly limited by motion.    Liver/biliary: Small site of focal hepatic fat near the falciform ligament.  No biliary dilatation appreciated.    Pancreas: Chronic pancreatitis with stable appearance of the pancreas during the short interval between exams.    Spleen: Normal.    Adrenals: Normal.    Genitourinary: Bilateral renal stones.  No hydronephrosis.  Bladder not well assessed due to streak artifact.    Stomach/bowel: No bowel obstruction. Appendix not confidently seen.    Lymph nodes: No pathologically enlarged lymph node identified with noncontrast technique.    Peritoneum: No ascites or free air.    Vasculature: Numerous aortic and iliac artery calcifications.    Abdominal wall: Normal.    Lung bases: No consolidation or pleural effusion.    Bones: No acute osseous findings.                                               ED Course as of 06/20/22 1342   Mon Jun 20, 2022   1310 Magnesium(!): 1.50 [MP]   1327 Alcohol, Serum(!): 12.0  Alcohol and PO Vancomycin likely contributing to abd pain [MP]   1340 CBC Auto Differential(!)  WBC not elevated in setting of C diff colitis, vanc PO must be working [MP]      ED Course User Index  [MP] Roverto Rome DO                        Diagnostic Impression:    1. Gastritis without bleeding, unspecified chronicity, unspecified gastritis type    2. Hypomagnesemia    3. Chronic pancreatitis, unspecified pancreatitis type    4. C. difficile colitis    5. History of Clostridioides difficile colitis         ED Disposition Condition    Discharge Stable           Follow-up Information     Roney Leal MD In 3 days.    Specialty: Internal Medicine  Contact information:  [address] [place] [address] [phone]                          ED Prescriptions     Medication Sig Dispense Start Date End Date Auth. Provider    HYDROcodone-acetaminophen (NORCO)  mg per tablet Take 1 tablet by mouth every 6  (six) hours as needed for Pain. 20 tablet 6/20/2022 6/25/2022 Roverto Rome,     dicyclomine (BENTYL) 10 MG capsule Take 1 capsule (10 mg total) by mouth 4 (four) times daily before meals and nightly. for 7 days 28 capsule 6/20/2022 6/27/2022 Roverto Rome DO    ondansetron (ZOFRAN-ODT) 4 MG TbDL Take 1 tablet (4 mg total) by mouth every 6 (six) hours as needed. 15 tablet 6/20/2022  Roverto Rome DO        Follow-up Information     Follow up With Specialties Details Why Contact Info    Roney Leal MD Internal Medicine In 3 days  [address] [place] [address]  [phone]             Roverto Rome DO  06/20/22 0399

## 2022-06-21 ENCOUNTER — OUTPATIENT CASE MANAGEMENT (OUTPATIENT)
Dept: ADMINISTRATIVE | Facility: OTHER | Age: 72
End: 2022-06-21

## 2022-06-21 NOTE — LETTER
June 30, 2022    Niurka Mata  [address] [place] [address] Drive  [address]  [place] [address]             [facility] [address] [place] [address] I am writing from the Outpatient Care Management Department at [facility].  I've attempted to contact you regarding any needs you may have.  I have been unable to reach you by phone.   Please contact me if you would like to discuss any needs.     I can be reached [phone]  Monday thru Friday from 8:00am to 4:30pm.  [facility] also has a program with a nurse available 24/7 to answer questions or provide medical advice.  [facility] can be reached at [phone].     Sincerely,   Bárbara Alejo RN

## 2022-07-27 NOTE — PROGRESS NOTES
7-27-22    Phoned and LVM X3:  6-21-22, 6-30-22 and 7-27-22  Mailed UTR letter on 6-30-22  Case Closed

## 2022-08-31 ENCOUNTER — HOSPITAL ENCOUNTER (OUTPATIENT)
Dept: WOUND CARE | Facility: HOSPITAL | Age: 72
Discharge: HOME | End: 2022-08-31
Attending: EMERGENCY MEDICINE
Payer: MEDICARE

## 2022-08-31 VITALS
BODY MASS INDEX: 29.45 KG/M2 | RESPIRATION RATE: 18 BRPM | HEART RATE: 90 BPM | WEIGHT: 150 LBS | DIASTOLIC BLOOD PRESSURE: 57 MMHG | HEIGHT: 60 IN | TEMPERATURE: 99 F | SYSTOLIC BLOOD PRESSURE: 83 MMHG

## 2022-08-31 DIAGNOSIS — R53.81 PHYSICAL DECONDITIONING: ICD-10-CM

## 2022-08-31 DIAGNOSIS — I73.9 PERIPHERAL ARTERIAL DISEASE: ICD-10-CM

## 2022-08-31 DIAGNOSIS — R26.81 GAIT INSTABILITY: ICD-10-CM

## 2022-08-31 DIAGNOSIS — R39.81 FUNCTIONAL URINARY INCONTINENCE: ICD-10-CM

## 2022-08-31 DIAGNOSIS — E11.42 DIABETIC POLYNEUROPATHY ASSOCIATED WITH TYPE 2 DIABETES MELLITUS: Chronic | ICD-10-CM

## 2022-08-31 DIAGNOSIS — F17.200 TOBACCO DEPENDENCY: ICD-10-CM

## 2022-08-31 DIAGNOSIS — M15.4 EROSIVE OSTEOARTHRITIS: ICD-10-CM

## 2022-08-31 DIAGNOSIS — L98.429 STAGE 3 SKIN ULCER OF SACRAL REGION: Primary | ICD-10-CM

## 2022-08-31 LAB — POCT GLUCOSE: 153 MG/DL (ref 70–110)

## 2022-08-31 PROCEDURE — 27000999 HC MEDICAL RECORD PHOTO DOCUMENTATION

## 2022-08-31 PROCEDURE — 97597 DBRDMT OPN WND 1ST 20 CM/<: CPT | Mod: 59

## 2022-08-31 PROCEDURE — 87070 CULTURE OTHR SPECIMN AEROBIC: CPT

## 2022-08-31 PROCEDURE — 11042 DBRDMT SUBQ TIS 1ST 20SQCM/<: CPT

## 2022-08-31 NOTE — PATIENT INSTRUCTIONS
Pt seen today by: Dr Marlys Velazquez---Updated orders at 1430    Home health and self care DRESSING INSTRUCTIONS:[facility]  Wound Vac, Roho, and TWILA mattress script attached with orders faxed to Home Health      Wound location: Sacrum    Dressings to be changed daily and as needed if soiled or not intact.  Home health to visit and assist with dressing changes : Daily.  Patient will be seen in this clinic on  Wednesdays.  Patient and/or family may be asked to assist on other days. Home Health to leave enough supplies for family member to change dressings and demonstrate proper dressing change.    Cleanse wound with Vashe  Apply sensicare to the skin around the wound  Apply Vashe moistened bridger to the wound bed  Cover with exudry dressing and secure with mepilex bordered foam      Wound location: Right great toe    Cleanse wound with Vashe  Apply thera honey sheet to wound bed  Cover with a band-aid  Dressing to be changed daily    Return visit: September 7, 2022 at 10am      Wound may have been debrided in clinic: if so, WHAT YOU NEED TO KNOW:    Debridement is the removal of infected, damaged, or dead tissue so a wound can heal properly. Your wound may need more than one debridement. Debridement can cause bleeding, and a small amount of blood is expected.  AFTER A DEBRIDEMENT:    Keep your wound clean and dry. Do not remove the dressing unless instructed.  Follow the wound care orders provided to you or your home health care provider.  If you have pain, take over the counter pain relievers or pain medication if prescribed.  Elevate the wound and limit excessive activity to prevent bleeding and/or swelling in your wound.  If you see blood coming through the dressing, apply gauze and tape over the dressing and hold firm pressure to the wound with your hand for 5-10 minutes continuously, without peeking, to help the bleeding stop.  Contact [facility] wound care team at [phone] or go to the nearest Emergency  department if:    You have a fever greater than 101 taken by mouth.  Your pain gets worse or does not go away, even after taking your regular pain medicine.  Your skin around your wound is red, hot, swollen, or draining pus.  You have bleeding that continues to come through the dressing after holding pressure for 10 minutes       Compression: You may be using a compressive type of dressings to control edema: If so, keep your compression wrap or tubigrip in place. Do not get them wet, they should feel snug. If they feel tight, or cause pain in any way,  elevate your legs above your heart for 15 minutes. If the wraps still cause pain or you can not tolerate compression,  please remove and notify the clinic or your home health.     Nutrition:  The current daily value (%DV) for protein is 50 grams per day and is meant as a general goal for most people. Further increasing your dietary protein intake is very important for wound healing. Typically one needs over 100g of protein per day to help with wound healing needs.  If you are a dialysis patient or have problems with your kidneys, talk to your Nephrologist about how much protein you can take in with your condition.  Examples of high protein items that can be added to your diet include: eggs, chicken, red meats, almonds, cottage cheese, Greek yogurt, beans, and peanut butter.  Fortified protein bars, shakes and drinks can add 15-30 additional grams of protein per serving.   Also add:   1 daily general multivitamin   Adalberto : 1 packet twice daily   Vitamin C : 500mg twice daily   Zinc 220 mg daily  Vit D : once daily    Offloading   Offload your wound. This means to reduce pressure on and around the wound that reduces blood flow to the wound and prevents healing. Your wound care team will discuss specific ways for you to offload your specific wound. Common offloading strategies include:    Turn or reposition every 2 hours or sooner  Use pillows, wedges, ROHO wheelchair  cushions or other special devices like boots and shoes to lift the wound off of hard surfaces  Alternating Low Air-loss (ALAL) mattress may be ordered  Padded dressings can reduce wound pressure        Call our [facility] wound clinic for questions/concerns a [phone] .

## 2022-09-03 PROBLEM — R26.81 GAIT INSTABILITY: Status: ACTIVE | Noted: 2022-09-03

## 2022-09-03 LAB — BACTERIA SPEC CULT: NORMAL

## 2022-09-03 NOTE — PROGRESS NOTES
Subjective:       Patient ID: Niurka Mata is a 72 y.o. female.    Chief Complaint: Pressure Ulcer  72 yr female well known to our clinic but whom we have not seen for > year. She lives at home but apparently had recent hospitalization in June (June 12-15,2022) for C diff colitis. This was preceded by a heart attack as well as an acute gallbladder attack ([facility] admit 1/13-16/22).  She has had a zev last 6 months and she comes to our clinic today with a large sacral pressure ulcer that has been there for atleast 6  months if not more. She is here with her daughter who is someone we never met during our entire last treatment course with her. She used to be very non-compliant. She stays in bed most of day and can only get up to transfer without assistance.  One of the problems we had in the past was that she stayed in wet diapers for hours during the day and night due to insisting on living at home. She had been referred to [facility] and in fact had been in one before our last treatment but refused to go back. She denies a lot of pain in sacral region where she has large wound with large areas of undermining circumferentially with subcu depth. No recent fever or chills.   At her most recent admission she did have an abdominal CT done which did not show anything significant other than an ileus. She was found to have a yeast dermatitis and a UTI which, if I remember correctly, she gets quite often.   *daughter tells me she just got a NP as her provider which she did not have in the past.   PMH:   Failure to thrive  C. Diff colitis  Metabolic acidosis, improved  Chronic pancreatitis per CT scan  Stage II sacral pressure ulcer  Vulvovaginal candidiasis, resolved  Candidiasis under breasts. resp;jass  Type 2 insulin-dependent diabetes mellitus, Diabetic polyneuropathy  Essential hypertension  Coronary artery disease  Tobacco abuse  obesity  Gastroesophageal reflux disease  Peripheral arterial disease  Chronic back pain  with multiple chronic thoracolumbar compression fractures  Possible dementia or psychosis   ROS:  General: afebrile, no chills, + weakness  ENT: no sore throat but did have some difficulty swallowing, no congestion  Eye: + glasses, no new vision loss  Lungs: No cough or sob  Cv: no chest pain or chf  GI : no nausea or vomiting / no diarrhea at this time    + UTIs, frequency  Musc: weakness in lower legs / chronic back pain with hx compression fx.  Neuro: + neuropathy, no stroke     Objective:      Physical Exam  Vitals and nursing note reviewed.   Constitutional:       Appearance: She is normal weight.   HENT:      Head: Normocephalic and atraumatic.      Nose: Nose normal.      Mouth/Throat:      Mouth: Mucous membranes are moist.   Eyes:      Extraocular Movements: Extraocular movements intact.      Pupils: Pupils are equal, round, and reactive to light.   Cardiovascular:      Rate and Rhythm: Normal rate and regular rhythm.      Pulses: Normal pulses.      Heart sounds: Normal heart sounds.   Pulmonary:      Effort: Pulmonary effort is normal.      Breath sounds: Normal breath sounds.   Abdominal:      General: Abdomen is flat.   Musculoskeletal:      Cervical back: Normal range of motion.      Comments: M atrophy LE but passive ROM   Skin:     Capillary Refill: Capillary refill takes 2 to 3 seconds.      Findings: Lesion (large pink sacral wound with undermining/ pink tissue irasema-wound. right great toe with slough /exudate on full thickness wound distally) present.      Comments: Left heel with scar noted but no open wound // right great toe with yellow fibrous exudate on prominent dorsum of toe. No odor   Neurological:      Mental Status: She is alert and oriented to person, place, and time. Mental status is at baseline.      Motor: Weakness present.      Gait: Gait abnormal.   Psychiatric:         Mood and Affect: Mood normal.       Assessment:       1. Stage 3 skin ulcer of sacral region    2. Functional  urinary incontinence    3. Diabetic polyneuropathy associated with type 2 diabetes mellitus    4. Peripheral arterial disease    5. Erosive osteoarthritis    6. Tobacco dependency    7. Physical deconditioning    8. Gait instability           Altered Skin Integrity 08/31/22 1115 Right anterior Toe, first #3 Diabetic Ulcer (Active)   08/31/22 1115   Altered Skin Integrity Present on Admission: yes   Side: Right   Orientation: anterior   Location: Toe, first   Wound Number: #3   Is this injury device related?: No   Primary Wound Type: Diabetic ulc   Description of Altered Skin Integrity:    Ankle-Brachial Index: 8/31/22 R dp 100 pt 104   Pulses: Doopler monophasic   Removal Indication and Assessment:    Wound Outcome:    (Retired) Wound Length (cm):    (Retired) Wound Width (cm):    (Retired) Depth (cm):    Wound Description (Comments):    Removal Indications:    Wound Image   08/31/22 1101   Description of Altered Skin Integrity Full thickness tissue loss. Subcutaneous fat may be visible but bone, tendon or muscle are not exposed 08/31/22 1101   Dressing Appearance Intact;Moist drainage 08/31/22 1101   Drainage Amount Moderate 08/31/22 1101   Drainage Characteristics/Odor Yellow;Serosanguineous 08/31/22 1101   Appearance Yellow 08/31/22 1101   Black (%), Wound Tissue Color 0 % 08/31/22 1101   Red (%), Wound Tissue Color 0 % 08/31/22 1101   Yellow (%), Wound Tissue Color 100 % 08/31/22 1101   Periwound Area Intact 08/31/22 1101   Wound Edges Defined 08/31/22 1101   Wound Length (cm) 0.5 cm 08/31/22 1101   Wound Width (cm) 0.7 cm 08/31/22 1101   Wound Depth (cm) 0.2 cm 08/31/22 1101   Wound Volume (cm^3) 0.07 cm^3 08/31/22 1101   Wound Surface Area (cm^2) 0.35 cm^2 08/31/22 1101   Undermining (depth (cm)/location) 0.2 between 12-12:00 08/31/22 1101   Care Cleansed with:;Wound cleanser 08/31/22 1101   Dressing Applied;Honey;Bandaid;Other (comment) 08/31/22 1101            Wound 06/13/22 1235 Other (comment) medial  Sacral Spine (Active)   06/13/22 1235    Pre-existing: Yes   Primary Wound Type: Other   Side:    Orientation: medial   Location: Sacral Spine   Wound Number:    Ankle-Brachial Index:    Pulses:    Removal Indication and Assessment:    Wound Outcome:    (Retired) Wound Type:    (Retired) Wound Length (cm):    (Retired) Wound Width (cm):    (Retired) Depth (cm):    Wound Description (Comments):    Removal Indications:    Wound Image   08/31/22 1129   Dressing Appearance Intact;Moist drainage 08/31/22 1129   Drainage Amount Moderate 08/31/22 1129   Drainage Characteristics/Odor Yellow;Serosanguineous 08/31/22 1129   Appearance Pink;Yellow 08/31/22 1129   Tissue loss description Full thickness 08/31/22 1129   Black (%), Wound Tissue Color 0 % 08/31/22 1129   Red (%), Wound Tissue Color 80 % 08/31/22 1129   Yellow (%), Wound Tissue Color 20 % 08/31/22 1129   Periwound Area Intact 08/31/22 1129   Wound Edges Defined 08/31/22 1129   Wound Length (cm) 5.3 cm 08/31/22 1129   Wound Width (cm) 3 cm 08/31/22 1129   Wound Depth (cm) 2.5 cm 08/31/22 1129   Wound Volume (cm^3) 39.75 cm^3 08/31/22 1129   Wound Surface Area (cm^2) 15.9 cm^2 08/31/22 1129   Undermining (depth (cm)/location) 1.5 between 9-12:00 08/31/22 1129   Care Cleansed with:;Wound cleanser 08/31/22 1129   Dressing Gauze, wet to moist;Absorptive Pad 08/31/22 1129       [REMOVED]      (Retired) Wound 06/13/22 1233 Other (comment) Left Heel (Removed)   06/13/22 1233    Pre-existing: Yes   Primary Wound Type: Other   Side: Left   Orientation:    Location: Heel   Wound Number:    Ankle-Brachial Index: 8/31/22 .80   Pulses: Doopler biphasic   Removal Indication and Assessment:    Wound Outcome: Healed   (Retired) Wound Type:    (Retired) Wound Length (cm):    (Retired) Wound Width (cm):    (Retired) Depth (cm):    Wound Description (Comments):    Removal Indications:    Removed 08/31/22    Wound Image   08/31/22 1101   Dressing Appearance Open to air 08/31/22 1101    Drainage Amount None 08/31/22 1101   Appearance Epithelialization 08/31/22 1101   Tissue loss description Not applicable 08/31/22 1101   Black (%), Wound Tissue Color 0 % 08/31/22 1101   Red (%), Wound Tissue Color 0 % 08/31/22 1101   Yellow (%), Wound Tissue Color 0 % 08/31/22 1101   Periwound Area Intact 08/31/22 1101   Wound Length (cm) 0 cm 08/31/22 1101   Wound Width (cm) 0 cm 08/31/22 1101   Wound Depth (cm) 0 cm 08/31/22 1101   Wound Volume (cm^3) 0 cm^3 08/31/22 1101   Wound Surface Area (cm^2) 0 cm^2 08/31/22 1101   Care Cleansed with:;Wound cleanser 08/31/22 1101   Dressing Foam 08/31/22 1101   6/15/22: A1C: 6.3  Albumin: 2.6  Lab Results   Component Value Date    HGBA1C 6.3 06/15/2022     Lab Results   Component Value Date    WBC 10.0 06/20/2022    HGB 12.0 06/20/2022    HCT 36.4 (L) 06/20/2022     06/20/2022    CHOL 143 01/16/2022    TRIG 135 01/16/2022    HDL 28 (L) 01/16/2022    ALT 8 06/20/2022    AST 17 06/20/2022     06/20/2022    K 3.7 06/20/2022    CREATININE 0.70 06/20/2022    BUN 7.5 (L) 06/20/2022    CO2 25 06/20/2022    TSH 3.190 06/03/2021    INR 1.05 (L) 06/20/2022    HGBA1C 6.3 06/15/2022           Plan:     I reviewed Ms. Mata's most recent hospital stay as well as prior labs and imaging. She has been at home inbetween the hospital admits which she tells me began in February this year.  She was instructed about importance of offloading- her heels (as her wound is finally healed), and her buttock. We reminded her to turn every 2hr when in bed, I wrote her for a roho gel cushion for her recliner at home or wheelchair. I also wrote her for a TWILA mattress.  We discusssed need to stay clean and not sit in wet diapers during day or night / needs to get up and move more. I am told by daughter NATALIIA is coming to home now for PT and is getting her up more often. They tell me she is sitting on the toilet frequently as she knows when she has to urinate. I also advised to not drink fluids  2hr before bed. I asked her to wear heel booties when in chair and bed.  I debrided her wounds. Her sacral pressure ulcer is large but no exposed bone at this time. I think she would do well with NPWT. I debrided her toe ulcer and will use Honey to clean it up. Her left heel ulcer which we had seen her for in the past is healed.    I reminded both she and daughter about need to eat high protein diet and take supplemental vitamins.   I will see her back in one week.

## 2022-09-03 NOTE — PROCEDURES
"Debridement    Date/Time: 8/31/2022 10:00 AM  Performed by: Marlys Velazquez DO  Authorized by: Marlys Velazquez DO     Time out: Immediately prior to procedure a "time out" was called to verify the correct patient, procedure, equipment, support staff and site/side marked as required.    Consent Done?:  Yes (Verbal)    Preparation: Patient was prepped and draped in usual sterile fashion    Local anesthesia used?: Yes    Local anesthetic:  Topical anesthetic    Wound Details:    Location:  Sacrum    Type of Debridement:  Excisional       Length (cm):  5.3       Area (sq cm):  15.9       Width (cm):  3       Percent Debrided (%):  100       Depth (cm):  2.7       Total Area Debrided (sq cm):  15.9    Depth of debridement:  Subcutaneous tissue    Tissue debrided:  Subcutaneous    Devitalized tissue debrided:  Biofilm, Exudate and Slough    Instruments:  Curette    2nd Wound Details:     Location:  Right foot    Location:  Right 1st Toe    Location:  Right 1st Toe    Type of Debridement:  Non-excisional       Length (cm):  0.5       Area (sq cm):  0.4       Width (cm):  0.7       Percent Debrided (%):  100       Depth (cm):  0.2       Total Area Debrided (sq cm):  0.4    Depth of debridement:  Epidermis/Dermis    Tissue debrided:  Epidermis and Dermis    Devitalized tissue debrided:  Biofilm, Slough and Fibrin    Instruments:  Blade    Bleeding:  Minimal  Hemostasis Achieved: Yes    Method Used:  Pressure  Patient tolerance:  Patient tolerated the procedure well with no immediate complications     Large pressure ulcer sacral region with undermining along both sides, butterfly pattern across top of buttock with pink moist skin around edges  / slight tunneling noted within wound but no odor   "

## 2022-09-07 ENCOUNTER — HOSPITAL ENCOUNTER (OUTPATIENT)
Dept: WOUND CARE | Facility: HOSPITAL | Age: 72
Discharge: HOME | End: 2022-09-07
Attending: EMERGENCY MEDICINE
Payer: MEDICARE

## 2022-09-07 ENCOUNTER — HOSPITAL ENCOUNTER (OUTPATIENT)
Dept: RADIOLOGY | Facility: HOSPITAL | Age: 72
Discharge: HOME | End: 2022-09-07
Attending: EMERGENCY MEDICINE
Payer: MEDICARE

## 2022-09-07 VITALS
BODY MASS INDEX: 29.45 KG/M2 | HEART RATE: 89 BPM | TEMPERATURE: 99 F | SYSTOLIC BLOOD PRESSURE: 134 MMHG | HEIGHT: 60 IN | DIASTOLIC BLOOD PRESSURE: 75 MMHG | WEIGHT: 150 LBS | RESPIRATION RATE: 20 BRPM

## 2022-09-07 DIAGNOSIS — L89.154 PRESSURE ULCER OF SACRAL REGION, STAGE 4: ICD-10-CM

## 2022-09-07 DIAGNOSIS — E78.5 HYPERLIPIDEMIA, UNSPECIFIED HYPERLIPIDEMIA TYPE: ICD-10-CM

## 2022-09-07 DIAGNOSIS — E46 MALNUTRITION, UNSPECIFIED TYPE: ICD-10-CM

## 2022-09-07 DIAGNOSIS — I10 BENIGN HYPERTENSION: ICD-10-CM

## 2022-09-07 DIAGNOSIS — I73.9 PERIPHERAL VASCULAR DISEASE: ICD-10-CM

## 2022-09-07 DIAGNOSIS — R63.6 UNDERWEIGHT: ICD-10-CM

## 2022-09-07 DIAGNOSIS — E11.621 TYPE 2 DIABETES MELLITUS WITH FOOT ULCER, WITHOUT LONG-TERM CURRENT USE OF INSULIN: ICD-10-CM

## 2022-09-07 DIAGNOSIS — F17.200 TOBACCO DEPENDENCY: ICD-10-CM

## 2022-09-07 DIAGNOSIS — I25.10 CORONARY ARTERIOSCLEROSIS: ICD-10-CM

## 2022-09-07 DIAGNOSIS — R29.898 WEAKNESS OF BOTH LOWER EXTREMITIES: ICD-10-CM

## 2022-09-07 DIAGNOSIS — E11.621 DIABETIC ULCER OF RIGHT GREAT TOE: ICD-10-CM

## 2022-09-07 DIAGNOSIS — L97.509 TYPE 2 DIABETES MELLITUS WITH FOOT ULCER, WITHOUT LONG-TERM CURRENT USE OF INSULIN: ICD-10-CM

## 2022-09-07 DIAGNOSIS — R62.7 FAILURE TO THRIVE IN ADULT: ICD-10-CM

## 2022-09-07 DIAGNOSIS — L97.519 DIABETIC ULCER OF RIGHT GREAT TOE: ICD-10-CM

## 2022-09-07 LAB — POCT GLUCOSE: 131 MG/DL (ref 70–110)

## 2022-09-07 PROCEDURE — 27000999 HC MEDICAL RECORD PHOTO DOCUMENTATION

## 2022-09-07 PROCEDURE — 87070 CULTURE OTHR SPECIMN AEROBIC: CPT | Performed by: EMERGENCY MEDICINE

## 2022-09-07 PROCEDURE — 99215 OFFICE O/P EST HI 40 MIN: CPT

## 2022-09-07 PROCEDURE — 73660 X-RAY EXAM OF TOE(S): CPT | Mod: TC,RT

## 2022-09-07 NOTE — PATIENT INSTRUCTIONS
"Pt seen today by: Dr Joselin Lake    [facility] and self care DRESSING INSTRUCTIONS:    Home Health please draw a pre-albumin on next visit and fax results to us at [phone]    Home Health to see pt on Monday and Thursday      Patient and/or family may be asked to assist on other days. Home Health to leave enough supplies for family member to change dressings and demonstrate proper dressing change.      Wound location: Sacrum    When VAC is received home health to apply using following orders:  Cleanse wound with Vasche  Apply skin prep and duoderm to periwound  Apply sponge to wound bed   Set at -125mmg Hg continuous pressure  Change VAC 2 x per week on Monday and Thursday    If vac is removed for any reason use the following orders:    Cleanse wound with Vashe  Apply sensicare to the skin around the wound  Apply Vashe moistened bridger to the wound bed  Cover with exudry dressing and secure with mepilex bordered foam  Change dressing daily or if soiled or not intact      Wound location: Right great toe    Cleanse wound with Vashe  Apply thera honey sheet to wound bed  Cover with a band-aid (if staying too moist then cover with a small piece of abd pad and wrap with 1" bridger)  Dressing to be changed on Monday and Thursday    Return visit: September 14, 2022 at 11:00 am      Wound may have been debrided in clinic: if so, WHAT YOU NEED TO KNOW:    Debridement is the removal of infected, damaged, or dead tissue so a wound can heal properly. Your wound may need more than one debridement. Debridement can cause bleeding, and a small amount of blood is expected.  AFTER A DEBRIDEMENT:    Keep your wound clean and dry. Do not remove the dressing unless instructed.  Follow the wound care orders provided to you or your home health care provider.  If you have pain, take over the counter pain relievers or pain medication if prescribed.  Elevate the wound and limit excessive activity to prevent bleeding " and/or swelling in your wound.  If you see blood coming through the dressing, apply gauze and tape over the dressing and hold firm pressure to the wound with your hand for 5-10 minutes continuously, without peeking, to help the bleeding stop.  Contact [facility] wound care team at [phone] or go to the nearest Emergency department if:    You have a fever greater than 101 taken by mouth.  Your pain gets worse or does not go away, even after taking your regular pain medicine.  Your skin around your wound is red, hot, swollen, or draining pus.  You have bleeding that continues to come through the dressing after holding pressure for 10 minutes       Compression: You may be using a compressive type of dressings to control edema: If so, keep your compression wrap or tubigrip in place. Do not get them wet, they should feel snug. If they feel tight, or cause pain in any way,  elevate your legs above your heart for 15 minutes. If the wraps still cause pain or you can not tolerate compression,  please remove and notify the clinic or your home health.     Nutrition:  The current daily value (%DV) for protein is 50 grams per day and is meant as a general goal for most people. Further increasing your dietary protein intake is very important for wound healing. Typically one needs over 100g of protein per day to help with wound healing needs.  If you are a dialysis patient or have problems with your kidneys, talk to your Nephrologist about how much protein you can take in with your condition.  Examples of high protein items that can be added to your diet include: eggs, chicken, red meats, almonds, cottage cheese, Greek yogurt, beans, and peanut butter.  Fortified protein bars, shakes and drinks can add 15-30 additional grams of protein per serving.   Also add:   1 daily general multivitamin   Adalberto : 1 packet twice daily   Vitamin C : 500mg twice daily   Zinc 220 mg daily  Vit D : once daily    Offloading   Offload your wound. This  means to reduce pressure on and around the wound that reduces blood flow to the wound and prevents healing. Your wound care team will discuss specific ways for you to offload your specific wound. Common offloading strategies include:    Turn or reposition every 2 hours or sooner  Use pillows, wedges, ROHO wheelchair cushions or other special devices like boots and shoes to lift the wound off of hard surfaces  Alternating Low Air-loss (ALAL) mattress may be ordered  Padded dressings can reduce wound pressure        Call our [facility] wound clinic for questions/concerns a [phone] .

## 2022-09-07 NOTE — PROGRESS NOTES
Subjective:       Patient ID: Niurka Mata is a 72 y.o. female.    Chief Complaint: Pressure Ulcer (Sacral region)    72-year-old white female recently referred to this Wound Care Clinic by [facility]/Dr. Haris Newman  to help treat chronic pressure ulcers.  That is a new PCP for this patient.  She met him on 08/18/2022.  Patient has a history of diabetes, coronary artery disease, generalized debility, dyslipidemia,  hypertension, PAD with stents, neuropathy, anemia, vitamin-D deficiency, malnutrition , chronic smoking as well as  chronic sacral ulcer and chronic right toe ulcer  Pt has a h/o of sacral pressure ulcer and a right heel ulcer that began back in 2020. She was referred to this clinic in the fall of 2020 by her home health company to help treat both the sacral ulcer and the heel ulcer.  She required LTACH admission in the fall of 2020 and advanced wound measures and both actually healed and she was discharged in Feb 2021.  This past year, pt reports having a 'rough year'. She had an MI, gallbladder and esophageal issues. She missed too many appointments with Dr Leal so she was fired from his practice.  She redeveloped a severe stage IV sacral pressure ulcer as well as a right dorsal toe DFU.  She returned to clinic last week on 8/31/22 and was seen by Dr Velazquez. Caroline has ordered home health and wound vac. Pt here today on 9/7/22 with daughter. Says vac arriving tomorrow. No other offloading devices arrived. Pt resumed smoking and smokes over one pack per day. Appetite is poor and doesn't get  much protein in. Still generally weak and doesn't  move around much      Past Medical History:   DM II, HTN, PAD s/p bilateral stents 2019, BLE lymphedema, Neuropathy, Microcytic anemia, Obesity , Sacral decubitus , prior right  heel  ulcer, Hypertension, dyslipidemia; failure to thrive, malnutrition, Vitamin D deficiency , heavy chronic smoker (>1ppd )    Procedure history: Back surgery,  Hysterectomy , LESI,  , Right MICHELL, Tonsillectomy , Right Vitrectomy 3/21/2014 and 2014, Stenting of BLE, BL cataract extraction 10/10/2012 and 10/24/2012, Lap Cholecystectomy (22)     Family History: Mother:  at 85yo w/ CAD, Father:  at 79yo of suicide + DM type II + lung cancer        Review of Systems   Constitutional:  Positive for fatigue. Negative for chills and fever. Activity change: chronically sedentary.  HENT: Negative.     Respiratory: Negative.     Cardiovascular:  Negative for chest pain and palpitations. Leg swelling: chronic BLE edema.  Gastrointestinal: Negative.    Genitourinary: Negative.    Musculoskeletal:  Positive for arthralgias and myalgias.   Skin:  Negative for color change, pallor and rash. Wound: see hpi.  Neurological:  Negative for dizziness, seizures, syncope and headaches. Weakness: diffuse.      Objective:      Vitals:    22 1027   BP: 134/75   Pulse: 89   Resp: 20   Temp: 98.6 °F (37 °C)     @poctglucose@  Recent Labs   Lab 22  1041   POCTGLUCOSE 131*     Physical Exam  Constitutional:       Appearance: She is underweight. She is ill-appearing. She is not diaphoretic.      Comments: Looks much older than stated age   HENT:      Head: Normocephalic and atraumatic.      Mouth/Throat:      Pharynx: Oropharynx is clear.   Eyes:      Conjunctiva/sclera: Conjunctivae normal.   Cardiovascular:      Pulses: Normal pulses.   Pulmonary:      Effort: Pulmonary effort is normal.   Abdominal:      General: Abdomen is flat.   Musculoskeletal:      Right lower leg: Edema present.      Left lower leg: Edema present.        Legs:         Feet:    Skin:     Coloration: Skin is not jaundiced or pale.      Findings: No bruising or erythema.   Neurological:      General: No focal deficit present.      Mental Status: She is alert and oriented to person, place, and time. Mental status is at baseline.      Motor: Weakness: diffuse.   Psychiatric:         Mood  and Affect: Mood normal.         Behavior: Behavior is cooperative.            Altered Skin Integrity 09/07/22 1023 Right dorsal Toe, first #3 Diabetic Ulcer (Active)   09/07/22 1023   Altered Skin Integrity Present on Admission: yes   Side: Right   Orientation: dorsal   Location: Toe, first   Wound Number: #3   Is this injury device related?: No   Primary Wound Type: Diabetic ulc   Description of Altered Skin Integrity:    Ankle-Brachial Index: 8/31/22 R dp 100 pt 104   Pulses: non-palpable x 4 - + Doppler monophasic x 4   Removal Indication and Assessment:    Wound Outcome:    (Retired) Wound Length (cm):    (Retired) Wound Width (cm):    (Retired) Depth (cm):    Wound Description (Comments):    Removal Indications:    Wound Image   09/07/22 1000   Dressing Appearance Intact;Moist drainage 09/07/22 1000   Drainage Amount Moderate 09/07/22 1000   Drainage Characteristics/Odor Yellow;No odor 09/07/22 1000   Appearance Intact;Yellow;Red;Pink 09/07/22 1000   Tissue loss description Full thickness 09/07/22 1000   Black (%), Wound Tissue Color 0 % 09/07/22 1000   Red (%), Wound Tissue Color 20 % 09/07/22 1000   Yellow (%), Wound Tissue Color 80 % 09/07/22 1000   Periwound Area Intact;Moist;Pale white 09/07/22 1000   Wound Edges Defined 09/07/22 1000   Wound Length (cm) 0.3 cm 09/07/22 1000   Wound Width (cm) 1 cm 09/07/22 1000   Wound Depth (cm) 0.2 cm 09/07/22 1000   Wound Volume (cm^3) 0.06 cm^3 09/07/22 1000   Wound Surface Area (cm^2) 0.3 cm^2 09/07/22 1000   Care Cleansed with:;Wound cleanser;Applied: 09/07/22 1000   Dressing Applied;Honey;Absorptive Pad;Rolled gauze 09/07/22 1200   Periwound Care Absorptive dressing applied 09/07/22 1200   Dressing Change Due 09/08/22 09/07/22 1200            (Retired) Wound 09/07/22 1023 Other (comment) medial Sacral Spine (Active)   09/07/22 1023    Pre-existing: Yes   Primary Wound Type: Other   Side:    Orientation: medial   Location: Sacral Spine   Wound Number:     Ankle-Brachial Index:    Pulses:    Removal Indication and Assessment:    Wound Outcome:    (Retired) Wound Type:    (Retired) Wound Length (cm):    (Retired) Wound Width (cm):    (Retired) Depth (cm):    Wound Description (Comments):    Removal Indications:    Wound Image   09/07/22 1000   Dressing Appearance Intact;Moist drainage 09/07/22 1000   Drainage Amount Moderate 09/07/22 1000   Drainage Characteristics/Odor Yellow;Malodorous 09/07/22 1000   Appearance Red;Yellow;Pink 09/07/22 1000   Tissue loss description Full thickness 09/07/22 1000   Black (%), Wound Tissue Color 0 % 09/07/22 1000   Red (%), Wound Tissue Color 70 % 09/07/22 1000   Yellow (%), Wound Tissue Color 30 % 09/07/22 1000   Periwound Area Intact;Moist 09/07/22 1000   Wound Edges Defined 09/07/22 1000   Wound Length (cm) 5 cm 09/07/22 1000   Wound Width (cm) 2.2 cm 09/07/22 1000   Wound Depth (cm) 2 cm 09/07/22 1000   Wound Volume (cm^3) 22 cm^3 09/07/22 1000   Wound Surface Area (cm^2) 11 cm^2 09/07/22 1000   Undermining (depth (cm)/location) 2.2 cm b/t 9:00-4:00 (deepest b/t 9-10) 09/07/22 1000   Care Cleansed with:;Wound cleanser;Applied: 09/07/22 1000   Dressing Applied;Gauze, wet to dry;Absorptive Pad;Foam 09/07/22 1200   Packing packed with 09/07/22 1200   Periwound Care Moisture barrier applied;Skin barrier film applied 09/07/22 1200   Dressing Change Due 09/08/22 09/07/22 1200           Assessment:       1. Pressure ulcer of sacral region, stage 4    2. Diabetic ulcer of right great toe    3. Weakness of both lower extremities    4. Failure to thrive in adult    5. Malnutrition, unspecified type    6. Type 2 diabetes mellitus with foot ulcer, without long-term current use of insulin    7. Peripheral vascular disease    8. Coronary arteriosclerosis    9. Tobacco dependency    10. Benign hypertension    11. Hyperlipidemia, unspecified hyperlipidemia type    12. Underweight            Sacral pressure ulcer, stage IV: 2020, returned early  2022, return to clinic 8/31/22  Right dorsal great toe DFU: exposed bone/joint capsule noted 9/7/22  Prior left heel diabetic/pressure ulcer 2020  Severe BLE PAD:  previous stents  2019 with CIS/[place]; [facility] US early May 2020:  high grade stenosis bilateral common fem arteries.  CIS 11/4/20: Balloon angioplasty Laser atherectomy of Left SFA pop segment and Balloon angioplasty Left KRISTEN-Dr Fleming  Diabetes with neuropathy, hemoglobin A1c  May 2020: > 14, Down to 7.0 August 2020: unknown recent  bilateral lower extremity lymphedema with chronic stasis changes.  Malnutrition, prealbumin  17.0 September 2020  Deconditioning, and failure to thrive  Hypertension  Dyslipidemia  CAD  Heavy/chronic smoker  Lab Results   Component Value Date    WBC 10.0 06/20/2022    HGB 12.0 06/20/2022    HCT 36.4 (L) 06/20/2022    MCV 85.2 06/20/2022     06/20/2022         CMP  Sodium Level   Date Value Ref Range Status   06/20/2022 141 136 - 145 mmol/L Final     Potassium Level   Date Value Ref Range Status   06/20/2022 3.7 3.5 - 5.1 mmol/L Final     Carbon Dioxide   Date Value Ref Range Status   06/20/2022 25 23 - 31 mmol/L Final     Blood Urea Nitrogen   Date Value Ref Range Status   06/20/2022 7.5 (L) 9.8 - 20.1 mg/dL Final     Creatinine   Date Value Ref Range Status   06/20/2022 0.70 0.55 - 1.02 mg/dL Final     Calcium Level Total   Date Value Ref Range Status   06/20/2022 8.8 8.4 - 10.2 mg/dL Final     Albumin Level   Date Value Ref Range Status   06/20/2022 2.6 (L) 3.4 - 4.8 gm/dL Final     Bilirubin Total   Date Value Ref Range Status   06/20/2022 0.4 <=1.5 mg/dL Final     Alkaline Phosphatase   Date Value Ref Range Status   06/20/2022 68 40 - 150 unit/L Final     Aspartate Aminotransferase   Date Value Ref Range Status   06/20/2022 17 5 - 34 unit/L Final     Alanine Aminotransferase   Date Value Ref Range Status   06/20/2022 8 0 - 55 unit/L Final     Estimated GFR-Non    Date Value Ref Range Status    06/20/2022 >60 mls/min/1.73/m2 Final     Lab Results   Component Value Date    HGBA1C 6.3 06/15/2022       Plan:     Plan of Care:    This patient used to come to the Wound Care Clinic back in 2020 when she had a sacral pressure ulcer and a left heel ulcer.  Those healed and she was discharged but evidently she has not been doing well over the past year and her overall condition has deteriorated once again where she is extremely weak, malnourished, deconditioned and basically has  failure to thrive. (Like she did in 2020).  Her prior primary provider, Dr Leal fired her for missing appointments.  She finally  got a new doctor at the [facility] who met her on August 18th.  He ordered laboratory data (which we need to get )and he referred her to this clinic.  She has a severe stay stage IV sacral pressure ulcer that has been there for all of 2022 as well as a right dorsal hallux Dfu that has  exposed joint/bone.  Dr Velazquez saw her last week and ordered NPWT which will start tomorrow with home health for the sacral pressure ulcer.  I used nail nippers and sent off the shard of bone or joint capsule to micro for culture and will also order an xray of this toe  Pt has an uphill renteria: she is a heavy smoker, she is malnourished and underweight, doesn't have good oral intake and doesn't move around much at all. All of this will negatively impact her chance to heal any wounds.  Offloading: discussed that she must offload the sacrum and heels/toes to potentiate wound healing: use darco shoe, rolling knee scooter, ROHO/offloading cushion , TWILA mattress with frequent turning/standing; (these things have been ordered)  Nutrition: Must have a high protein diet to support wound  healing;   this should be over 100g protein /day (if no kidney issues); Also rec MVI along with vit C, vit D, zinc and Adalberto  Diabetes: must have a strict diabetic diet,  Smoker: must stop smoking: explained the negative impact  this has own not only the wounds (delayed healing) but overall health as well  Return to clinic 1 week           The time spent including preparing to see the patient, obtaining patient history and assessment, evaluation of the plan of care, patient/caregiver counseling and education, orders, documentation, coordination of care, and other professional medical management activities for today's encounter was 55 minutes.    Time spent performing procedures during today's encounter was 8 minutes.  I used nail nippers to nip off the exposed friable bone/joint capsule and sent to microbiology; pt tolerated well; no bleeding when wound redressed.

## 2022-09-10 LAB — BACTERIA SPEC CULT: NORMAL

## 2022-09-14 ENCOUNTER — HOSPITAL ENCOUNTER (OUTPATIENT)
Dept: WOUND CARE | Facility: HOSPITAL | Age: 72
Discharge: HOME | End: 2022-09-14
Attending: EMERGENCY MEDICINE
Payer: MEDICARE

## 2022-09-14 ENCOUNTER — TELEPHONE (OUTPATIENT)
Dept: WOUND CARE | Facility: HOSPITAL | Age: 72
End: 2022-09-14

## 2022-09-14 VITALS
RESPIRATION RATE: 20 BRPM | BODY MASS INDEX: 29.45 KG/M2 | SYSTOLIC BLOOD PRESSURE: 151 MMHG | TEMPERATURE: 99 F | DIASTOLIC BLOOD PRESSURE: 68 MMHG | WEIGHT: 150 LBS | HEIGHT: 60 IN | HEART RATE: 99 BPM

## 2022-09-14 DIAGNOSIS — L97.519 DIABETIC ULCER OF RIGHT GREAT TOE: ICD-10-CM

## 2022-09-14 DIAGNOSIS — L89.154 PRESSURE ULCER OF SACRAL REGION, STAGE 4: Primary | ICD-10-CM

## 2022-09-14 DIAGNOSIS — E46 MALNUTRITION, UNSPECIFIED TYPE: ICD-10-CM

## 2022-09-14 DIAGNOSIS — R62.7 FAILURE TO THRIVE IN ADULT: ICD-10-CM

## 2022-09-14 DIAGNOSIS — F17.200 TOBACCO DEPENDENCY: ICD-10-CM

## 2022-09-14 DIAGNOSIS — E11.621 DIABETIC ULCER OF RIGHT GREAT TOE: ICD-10-CM

## 2022-09-14 DIAGNOSIS — R29.898 WEAKNESS OF BOTH LOWER EXTREMITIES: ICD-10-CM

## 2022-09-14 PROCEDURE — 11042 DBRDMT SUBQ TIS 1ST 20SQCM/<: CPT

## 2022-09-14 PROCEDURE — 27000999 HC MEDICAL RECORD PHOTO DOCUMENTATION

## 2022-09-14 NOTE — PATIENT INSTRUCTIONS
"Pt seen today by: Dr Marlys Velazquez    [facility] Home health and self care DRESSING INSTRUCTIONS:    Home health to repply vac tomorrow Thursday, September 15, 2022    Home Health to see pt on Monday and Thursday      Patient and/or family may be asked to assist on other days. Home Health to leave enough supplies for family member to change dressings and demonstrate proper dressing change.      Wound location: Sacrum    When VAC is received home health to apply using following orders:  Cleanse wound with Vasche  Apply skin prep and duoderm to periwound (apply a piece of duoderm to over the existing piece (pt had a blister in that area) - be sure that no foam sits on her skin  Apply sponge to wound bed   Set at -125mmg Hg continuous pressure  Change VAC 2 x per week on Monday and Thursday    If vac is removed for any reason use the following orders:    Cleanse wound with Vashe  Apply sensicare to the skin around the wound  Apply aquacel advantage ag to wound bed, fill in void with dry 4x4  Cover with exudry dressing and secure with cover roll tape  Change dressing daily or if soiled or not intact      Wound location: Right great toe    Cleanse wound with Vashe  Apply promogran to wound bed  Cover with a band-aid (if staying too moist then cover with a small piece of abd pad and wrap with 1" bridger)  Dressing to be changed on Monday and Thursday    Return visit: September 21, 2022 at 9:00 am      Wound may have been debrided in clinic: if so, WHAT YOU NEED TO KNOW:    Debridement is the removal of infected, damaged, or dead tissue so a wound can heal properly. Your wound may need more than one debridement. Debridement can cause bleeding, and a small amount of blood is expected.  AFTER A DEBRIDEMENT:    Keep your wound clean and dry. Do not remove the dressing unless instructed.  Follow the wound care orders provided to you or your home health care provider.  If you have pain, take over the counter pain " relievers or pain medication if prescribed.  Elevate the wound and limit excessive activity to prevent bleeding and/or swelling in your wound.  If you see blood coming through the dressing, apply gauze and tape over the dressing and hold firm pressure to the wound with your hand for 5-10 minutes continuously, without peeking, to help the bleeding stop.  Contact [facility] wound care team at [phone] or go to the nearest Emergency department if:    You have a fever greater than 101 taken by mouth.  Your pain gets worse or does not go away, even after taking your regular pain medicine.  Your skin around your wound is red, hot, swollen, or draining pus.  You have bleeding that continues to come through the dressing after holding pressure for 10 minutes       Compression: You may be using a compressive type of dressings to control edema: If so, keep your compression wrap or tubigrip in place. Do not get them wet, they should feel snug. If they feel tight, or cause pain in any way,  elevate your legs above your heart for 15 minutes. If the wraps still cause pain or you can not tolerate compression,  please remove and notify the clinic or your home health.     Nutrition:  The current daily value (%DV) for protein is 50 grams per day and is meant as a general goal for most people. Further increasing your dietary protein intake is very important for wound healing. Typically one needs over 100g of protein per day to help with wound healing needs.  If you are a dialysis patient or have problems with your kidneys, talk to your Nephrologist about how much protein you can take in with your condition.  Examples of high protein items that can be added to your diet include: eggs, chicken, red meats, almonds, cottage cheese, Greek yogurt, beans, and peanut butter.  Fortified protein bars, shakes and drinks can add 15-30 additional grams of protein per serving.   Also add:   1 daily general multivitamin   Adalberot : 1 packet twice daily    Vitamin C : 500mg twice daily   Zinc 220 mg daily  Vit D : once daily    Offloading   Offload your wound. This means to reduce pressure on and around the wound that reduces blood flow to the wound and prevents healing. Your wound care team will discuss specific ways for you to offload your specific wound. Common offloading strategies include:    Turn or reposition every 2 hours or sooner  Use pillows, wedges, ROHO wheelchair cushions or other special devices like boots and shoes to lift the wound off of hard surfaces  Alternating Low Air-loss (ALAL) mattress may be ordered  Padded dressings can reduce wound pressure        Call our [facility] wound clinic for questions/concerns a [phone] .

## 2022-09-15 NOTE — PROCEDURES
"Debridement    Date/Time: 9/14/2022 11:00 AM  Performed by: Marlys Velazquez DO  Authorized by: Marlys Velazquez DO     Time out: Immediately prior to procedure a "time out" was called to verify the correct patient, procedure, equipment, support staff and site/side marked as required.    Consent Done?:  Yes (Verbal)    Preparation: Patient was prepped and draped in usual sterile fashion    Local anesthesia used?: Yes    Local anesthetic:  Topical anesthetic    Wound Details:    Location:  Right foot    Location:  Right 1st Toe    Type of Debridement:  Excisional       Length (cm):  0.3       Area (sq cm):  0.1       Width (cm):  0.4       Percent Debrided (%):  100       Depth (cm):  0.3       Total Area Debrided (sq cm):  0.1    Depth of debridement:  Subcutaneous tissue    Tissue debrided:  Subcutaneous    Devitalized tissue debrided:  Biofilm and Slough    Instruments:  Blade    2nd Wound Details:     Debridement - 2nd Wound - General Location: sacrum.    Type of Debridement:  Excisional       Length (cm):  5       Area (sq cm):  8.5       Width (cm):  1.7       Percent Debrided (%):  100       Depth (cm):  3.5       Total Area Debrided (sq cm):  8.5    Depth of debridement:  Subcutaneous tissue    Tissue debrided:  Subcutaneous and Fascia    Devitalized tissue debrided:  Biofilm, Slough and Exudate    Instruments:  Curette    Bleeding:  Minimal  Hemostasis Achieved: Yes    Method Used:  Pressure  Patient tolerance:  Patient tolerated the procedure well with no immediate complications     Toe wound bled well with no bone scraped.  Large sacral ulcer in butterfly pattern midline with undermining b/l and pink fleshy granulation tissue within the wound bed.   "

## 2022-09-15 NOTE — PROGRESS NOTES
Subjective:       Patient ID: Niurka Mata is a 72 y.o. female.    Chief Complaint: stage 4 pressure ulcer    HPI   Pressure Ulcer  72 yr female well known to our clinic but whom we have not seen since 2020.  She lives at home but apparently had recent hospitalization in June (June 12-15,2022) for C diff colitis. This was preceded by a heart attack as well as an acute gallbladder attack ([facility] admit 1/13-16/22).  She has had a zev last 6 months and she comes to our clinic today with a large sacral pressure ulcer that has been there for atleast 6  months if not more. She is here with her daughter who is someone we never met during our entire last treatment course with her. She has a history of non-compliance.  She stays in bed most of day and can only get up to transfer without assistance. A prealbumin done last week was very low (8.0). She tells me she started smoking again on her 72th birthday and is smoking one ppd.   No recent fever or chills.   Due to being released by Dr Leal she has just recently gotten a new NP as her primary provider.    ROS:  General: afebrile, no chills, + weakness  ENT: no sore throat but did have some difficulty swallowing, no congestion  Eye: + glasses, no new vision loss  Lungs: No cough or sob  Cv: no chest pain or chf  GI : no nausea or vomiting / no diarrhea at this time    + UTIs, frequency  Musc: weakness in lower legs / chronic back pain with hx compression fx.  Neuro: + neuropathy, no stroke     Objective:      Physical Exam  Vitals and nursing note reviewed.   Constitutional:       Appearance: She is normal weight.   HENT:      Head: Normocephalic and atraumatic.      Nose: Nose normal.      Mouth/Throat:      Mouth: Mucous membranes are moist.   Eyes:      Extraocular Movements: Extraocular movements intact.      Pupils: Pupils are equal, round, and reactive to light.   Cardiovascular:      Rate and Rhythm: Normal rate and regular rhythm.      Pulses: Normal pulses.       Heart sounds: Normal heart sounds.   Pulmonary:      Effort: Pulmonary effort is normal.      Breath sounds: Normal breath sounds.   Abdominal:      General: Abdomen is flat.   Musculoskeletal:      Cervical back: Normal range of motion.      Comments: M atrophy LE but passive ROM   Skin:     Capillary Refill: Capillary refill takes 2 to 3 seconds.      Findings: Lesion (large pink sacral wound with undermining/ pink tissue irasema-wound. right great toe with ulcer, no bone exposed today.     Comments: Left heel with scar noted but no open wound   Neurological:      Mental Status: She is alert and oriented to person, place, and time. Mental status is at baseline.      Motor: Weakness present.      Gait: Gait abnormal.   Psychiatric:         Mood and Affect: Mood normal.        Sacral pressure ulcer, stage IV: 2020, returned early 2022, return to clinic 8/31/22  Right dorsal great toe DFU: exposed bone/joint capsule noted 9/7/22  Prior left heel diabetic/pressure ulcer 2020  Severe BLE PAD:  previous stents  2019 with CIS/Cheeran; [facility] US early May 2020:  high grade stenosis bilateral common fem arteries.  CIS 11/4/20: Balloon angioplasty Laser atherectomy of Left SFA pop segment and Balloon angioplasty Left KRISTEN-Dr Fleming  Diabetes with neuropathy, hemoglobin A1c  May 2020: > 14, Down to 7.0 August 2020: unknown recent  bilateral lower extremity lymphedema with chronic stasis changes.  Malnutrition, prealbumin  17.0 September 2020, 8.0 9/7/22  Deconditioning, and failure to thrive  Hypertension  Dyslipidemia  CAD  Heavy/chronic smoker  Bone and swab culture neg for acute bacterial infection 9/7/22  Assessment:       1. Pressure ulcer of sacral region, stage 4    2. Diabetic ulcer of right great toe    3. Weakness of both lower extremities    4. Failure to thrive in adult    5. Malnutrition, unspecified type    6. Tobacco dependency           Altered Skin Integrity 09/07/22 1023 Right dorsal Toe, first #3 Diabetic  Ulcer (Active)   09/07/22 1023   Altered Skin Integrity Present on Admission: yes   Side: Right   Orientation: dorsal   Location: Toe, first   Wound Number: #3   Is this injury device related?: No   Primary Wound Type: Diabetic ulc   Description of Altered Skin Integrity:    Ankle-Brachial Index: 8/31/22 R dp 100 pt 104   Pulses: non-palpable x 4 - + Doppler monophasic x 4   Removal Indication and Assessment:    Wound Outcome:    (Retired) Wound Length (cm):    (Retired) Wound Width (cm):    (Retired) Depth (cm):    Wound Description (Comments):    Removal Indications:    Wound Image   09/14/22 1200   Dressing Appearance Intact;Moist drainage 09/14/22 1200   Drainage Amount Moderate 09/14/22 1200   Drainage Characteristics/Odor Yellow;No odor 09/14/22 1200   Appearance Red;Pink;Yellow 09/14/22 1200   Tissue loss description Full thickness 09/14/22 1200   Black (%), Wound Tissue Color 0 % 09/14/22 1200   Red (%), Wound Tissue Color 80 % 09/14/22 1200   Yellow (%), Wound Tissue Color 20 % 09/14/22 1200   Periwound Area Intact;Dry 09/14/22 1200   Wound Edges Defined 09/14/22 1200   Wound Length (cm) 0.3 cm 09/14/22 1200   Wound Width (cm) 0.4 cm 09/14/22 1200   Wound Depth (cm) 0.3 cm 09/14/22 1200   Wound Volume (cm^3) 0.036 cm^3 09/14/22 1200   Wound Surface Area (cm^2) 0.12 cm^2 09/14/22 1200   Care Cleansed with:;Wound cleanser;Applied: 09/14/22 1200            (Retired) Wound 09/07/22 1023 Other (comment) medial Sacral Spine (Active)   09/07/22 1023    Pre-existing: Yes   Primary Wound Type: Other   Side:    Orientation: medial   Location: Sacral Spine   Wound Number:    Ankle-Brachial Index:    Pulses:    Removal Indication and Assessment:    Wound Outcome:    (Retired) Wound Type:    (Retired) Wound Length (cm):    (Retired) Wound Width (cm):    (Retired) Depth (cm):    Wound Description (Comments):    Removal Indications:    Wound Image   09/14/22 1200   Dressing Appearance Intact;Moist drainage 09/14/22 1200    Drainage Amount Moderate 09/14/22 1200   Drainage Characteristics/Odor Serosanguineous;Yellow;No odor 09/14/22 1200   Appearance Red;Pink;Yellow 09/14/22 1200   Tissue loss description Full thickness 09/14/22 1200   Black (%), Wound Tissue Color 0 % 09/14/22 1200   Red (%), Wound Tissue Color 80 % 09/14/22 1200   Yellow (%), Wound Tissue Color 20 % 09/14/22 1200   Periwound Area Intact 09/14/22 1200   Wound Edges Defined 09/14/22 1200   Wound Length (cm) 5 cm 09/14/22 1200   Wound Width (cm) 1.7 cm 09/14/22 1200   Wound Depth (cm) 3.4 cm 09/14/22 1200   Wound Volume (cm^3) 28.9 cm^3 09/14/22 1200   Wound Surface Area (cm^2) 8.5 cm^2 09/14/22 1200   Care Cleansed with:;Soap and water;Applied: 09/14/22 1200           Plan:              Lab Results   Component Value Date    [facility] 6.3 06/15/2022   EXAMINATION:  XR TOE 2 OR MORE VIEWS RIGHT     CLINICAL HISTORY:  DFU right great toe/;     COMPARISON:  None.     FINDINGS:  There is demineralization of the visualized osseous structures more than expected for patient's age     No acute displaced fractures or dislocations.     There are some degenerative changes of the proximal and distal interphalangeal joints with degenerative changes of the tarsal rows and tarsal metatarsal joints articular spaces are otherwise preserved with smooth articular surfaces     No blastic or lytic lesions.     A soft tissue defect is identified by the great toe which might be related to an ulceration     On the provided images there is no evidence of primary and/or secondary signs to suggest the presence of osteomyelitis these, however, might be lacking on plain films and therefore if clinically indicated other imaging modalities might prove helpful for further assessment     Impression:     Changes suggestive of ulceration.     Demineralization of the visualized osseous structures.     Degenerative changes.     No findings on the provided images to suggest osteomyelitis         Electronically signed by: Kenneth Pierce      I reviewed her labs  (both cultures negative for bacteria) and films from last week. She is severely malnourished. I gave her samples of Juveen as well as ensure and Glucerna this week (as I did last visit) to help her determine which she likes and would try to committ to taking on daily basis. I encouraged her to take in 80grams protein day.  I debrided her wound beds  - have concerns that she has some undermining still in sacral wound. We will pack it today and have HH resume NPWT tomorrow.  I will have her return in one week.  Encouraged to offload at all times. Is still waiting for roho cushion and TWILA mattress for home.   5.   Discussed again the affect cigarette smoke has on wound healing.

## 2022-09-16 ENCOUNTER — DOCUMENT SCAN (OUTPATIENT)
Dept: HOME HEALTH SERVICES | Facility: HOSPITAL | Age: 72
End: 2022-09-16
Payer: MEDICARE

## 2022-09-16 ENCOUNTER — DOCUMENT SCAN (OUTPATIENT)
Dept: HOME HEALTH SERVICES | Facility: HOSPITAL | Age: 72
End: 2022-09-16

## 2022-09-21 ENCOUNTER — HOSPITAL ENCOUNTER (OUTPATIENT)
Dept: WOUND CARE | Facility: HOSPITAL | Age: 72
Discharge: HOME | End: 2022-09-21
Attending: EMERGENCY MEDICINE
Payer: MEDICARE

## 2022-09-21 VITALS
WEIGHT: 150 LBS | BODY MASS INDEX: 29.45 KG/M2 | TEMPERATURE: 98 F | HEIGHT: 60 IN | RESPIRATION RATE: 16 BRPM | HEART RATE: 109 BPM | DIASTOLIC BLOOD PRESSURE: 75 MMHG | SYSTOLIC BLOOD PRESSURE: 123 MMHG

## 2022-09-21 DIAGNOSIS — L97.509 TYPE 2 DIABETES MELLITUS WITH FOOT ULCER, WITHOUT LONG-TERM CURRENT USE OF INSULIN: ICD-10-CM

## 2022-09-21 DIAGNOSIS — E46 MALNUTRITION, UNSPECIFIED TYPE: ICD-10-CM

## 2022-09-21 DIAGNOSIS — R29.898 WEAKNESS OF BOTH LOWER EXTREMITIES: ICD-10-CM

## 2022-09-21 DIAGNOSIS — E11.621 DIABETIC ULCER OF RIGHT GREAT TOE: ICD-10-CM

## 2022-09-21 DIAGNOSIS — F17.200 TOBACCO DEPENDENCY: ICD-10-CM

## 2022-09-21 DIAGNOSIS — R62.7 FAILURE TO THRIVE IN ADULT: ICD-10-CM

## 2022-09-21 DIAGNOSIS — E11.621 TYPE 2 DIABETES MELLITUS WITH FOOT ULCER, WITHOUT LONG-TERM CURRENT USE OF INSULIN: ICD-10-CM

## 2022-09-21 DIAGNOSIS — L89.154 PRESSURE ULCER OF SACRAL REGION, STAGE 4: Primary | ICD-10-CM

## 2022-09-21 DIAGNOSIS — L97.519 DIABETIC ULCER OF RIGHT GREAT TOE: ICD-10-CM

## 2022-09-21 LAB — POCT GLUCOSE: 231 MG/DL (ref 70–110)

## 2022-09-21 PROCEDURE — 11042 DBRDMT SUBQ TIS 1ST 20SQCM/<: CPT

## 2022-09-21 PROCEDURE — 27000999 HC MEDICAL RECORD PHOTO DOCUMENTATION

## 2022-09-21 NOTE — PATIENT INSTRUCTIONS
"Pt seen today by: Dr Marlys Velazquez    [facility] and self care DRESSING INSTRUCTIONS:    Home health to repply vac tomorrow Thursday, September 22, 2022    Home Health to see pt on Monday and Thursday      Patient and/or family may be asked to assist on other days. Home Health to leave enough supplies for family member to change dressings and demonstrate proper dressing change.      Wound location: Sacrum    When VAC is received home health to apply using following orders:  Cleanse wound with Vasche  Apply skin prep and duoderm to periwound (apply a piece of duoderm to over the existing piece (pt had a blister in that area) - be sure that no foam sits on her skin  Apply sponge to wound bed   Set at -125mmg Hg continuous pressure  Change VAC 2 x per week on Monday and Thursday    If vac is removed for any reason use the following orders:    Cleanse wound with Vashe  Apply sensicare to the skin around the wound  Apply aquacel advantage ag to wound bed, fill in void with dry 4x4  Cover with exudry dressing and secure with cover roll tape  Change dressing daily or if soiled or not intact      Wound location: Right great toe    Cleanse wound with Vashe  Apply promogran to wound bed  Cover with a band-aid (if staying too moist then cover with a small piece of abd pad and wrap with 1" bridger)  Dressing to be changed on Monday and Thursday    Return visit: September 28, 2022 at11:00 am      Wound may have been debrided in clinic: if so, WHAT YOU NEED TO KNOW:    Debridement is the removal of infected, damaged, or dead tissue so a wound can heal properly. Your wound may need more than one debridement. Debridement can cause bleeding, and a small amount of blood is expected.  AFTER A DEBRIDEMENT:    Keep your wound clean and dry. Do not remove the dressing unless instructed.  Follow the wound care orders provided to you or your home health care provider.  If you have pain, take over the counter pain " relievers or pain medication if prescribed.  Elevate the wound and limit excessive activity to prevent bleeding and/or swelling in your wound.  If you see blood coming through the dressing, apply gauze and tape over the dressing and hold firm pressure to the wound with your hand for 5-10 minutes continuously, without peeking, to help the bleeding stop.  Contact [facility] wound care team at [phone] or go to the nearest Emergency department if:    You have a fever greater than 101 taken by mouth.  Your pain gets worse or does not go away, even after taking your regular pain medicine.  Your skin around your wound is red, hot, swollen, or draining pus.  You have bleeding that continues to come through the dressing after holding pressure for 10 minutes       Compression: You may be using a compressive type of dressings to control edema: If so, keep your compression wrap or tubigrip in place. Do not get them wet, they should feel snug. If they feel tight, or cause pain in any way,  elevate your legs above your heart for 15 minutes. If the wraps still cause pain or you can not tolerate compression,  please remove and notify the clinic or your home health.     Nutrition:  The current daily value (%DV) for protein is 50 grams per day and is meant as a general goal for most people. Further increasing your dietary protein intake is very important for wound healing. Typically one needs over 100g of protein per day to help with wound healing needs.  If you are a dialysis patient or have problems with your kidneys, talk to your Nephrologist about how much protein you can take in with your condition.  Examples of high protein items that can be added to your diet include: eggs, chicken, red meats, almonds, cottage cheese, Greek yogurt, beans, and peanut butter.  Fortified protein bars, shakes and drinks can add 15-30 additional grams of protein per serving.   Also add:   1 daily general multivitamin   Adalberto : 1 packet twice daily    Vitamin C : 500mg twice daily   Zinc 220 mg daily  Vit D : once daily    Offloading   Offload your wound. This means to reduce pressure on and around the wound that reduces blood flow to the wound and prevents healing. Your wound care team will discuss specific ways for you to offload your specific wound. Common offloading strategies include:    Turn or reposition every 2 hours or sooner  Use pillows, wedges, ROHO wheelchair cushions or other special devices like boots and shoes to lift the wound off of hard surfaces  Alternating Low Air-loss (ALAL) mattress may be ordered  Padded dressings can reduce wound pressure        Call our [facility] wound clinic for questions/concerns a [phone] .

## 2022-09-22 NOTE — PROGRESS NOTES
Subjective:       Patient ID: Niurka Mata is a 72 y.o. female.    Chief Complaint: No chief complaint on file.    HPI  Pressure Ulcer  72 yr female well known to our clinic for prior wound issue due to parapesis and wheelchair status. Ms. Hughes returned first week of September for a large sacral wound that she has had for atleast 6 months as well as a wound to the dorsal side of her great toe on right foot just below the nail. She restarted smoking for her 72th birthday. A PAB ordered at clinic this month returned at 8.0; thus we have several obstacles impeding her healing status.   No recent fever or chills. On today's visit she c/o of buttock pain over her wound bed as well as just touching the skin of her buttock. There is no redness or warmth or areas of induration or fluctuance. She is already on Lortab and gabapentin.    ROS:  General: afebrile, no chills, + weakness  ENT: no sore throat but did have some difficulty swallowing, no congestion  Eye: + glasses, no new vision loss  Lungs: No cough or sob  Cv: no chest pain or chf  GI : no nausea or vomiting / no diarrhea at this time    + UTIs, frequency  Musc: weakness in lower legs / chronic back pain with hx compression fx.  Neuro: + neuropathy, no stroke   Skin: + wounds to right great toe, sacrum. Neuralgia over buttock skin    Objective:      Physical Exam  Vitals and nursing note reviewed.   Constitutional:       Appearance: She is normal weight.   HENT:      Head: Normocephalic and atraumatic.      Nose: Nose normal.      Mouth/Throat:      Mouth: Mucous membranes are moist.   Eyes:      Extraocular Movements: Extraocular movements intact.      Pupils: Pupils are equal, round, and reactive to light.   Cardiovascular:      Rate and Rhythm: Normal rate and regular rhythm.      Pulses: Normal pulses.      Heart sounds: Normal heart sounds.   Pulmonary:      Effort: Pulmonary effort is normal.      Breath sounds: Normal breath sounds.   Abdominal:       General: Abdomen is flat.   Musculoskeletal:      Cervical back: Normal range of motion.      Comments: M atrophy LE but passive ROM   Skin:     Capillary Refill: Capillary refill takes 2 to 3 seconds.      Findings: Lesion (large pink sacral wound with undermining/ pink tissue irasema-wound. right great toe with ulcer, +bone  scraped with curette.  today.     Comments: Left heel with scar noted but no open wound   Neurological:      Mental Status: She is alert and oriented to person, place, and time. Mental status is at baseline.      Motor: Weakness present.      Gait: Gait abnormal.   Psychiatric:         Mood and Affect: Mood normal.        Sacral pressure ulcer, stage IV: 2020, returned early 2022, return to clinic 8/31/22  Right dorsal great toe DFU: exposed bone/joint capsule noted 9/7/22  Prior left heel diabetic/pressure ulcer 2020  Severe BLE PAD:  previous stents  2019 with CIS/Cheeran; [facility] US early May 2020:  high grade stenosis bilateral common fem arteries.  CIS 11/4/20: Balloon angioplasty Laser atherectomy of Left SFA pop segment and Balloon angioplasty Left Ana Cristina Fleming  Diabetes with neuropathy, hemoglobin A1c  May 2020: > 14, Down to 7.0 August 2020: will order one today.  bilateral lower extremity lymphedema with chronic stasis changes.  Malnutrition, prealbumin  17.0 September 2020, 8.0 9/7/22  Deconditioning, and failure to thrive  Hypertension  Dyslipidemia  CAD  Heavy/chronic smoker  Bone and swab culture neg for acute bacterial infection 9/7/22       Assessment:       1. Pressure ulcer of sacral region, stage 4    2. Diabetic ulcer of right great toe    3. Weakness of both lower extremities    4. Failure to thrive in adult    5. Malnutrition, unspecified type    6. Tobacco dependency    7. Type 2 diabetes mellitus with foot ulcer, without long-term current use of insulin           Altered Skin Integrity 09/07/22 1023 Right dorsal Toe, first #3 Diabetic Ulcer (Active)   09/07/22 1023    Altered Skin Integrity Present on Admission: yes   Side: Right   Orientation: dorsal   Location: Toe, first   Wound Number: #3   Is this injury device related?: No   Primary Wound Type: Diabetic ulc   Description of Altered Skin Integrity:    Ankle-Brachial Index: 8/31/22 R dp 100 pt 104   Pulses: non-palpable x 4 - + Doppler monophasic x 4   Removal Indication and Assessment:    Wound Outcome:    (Retired) Wound Length (cm):    (Retired) Wound Width (cm):    (Retired) Depth (cm):    Wound Description (Comments):    Removal Indications:    Wound Image   09/21/22 1027   Description of Altered Skin Integrity Partial thickness tissue loss. Shallow open ulcer with a red or pink wound bed, without slough. Intact or Open/Ruptured Serum-filled blister. 09/21/22 1027   Dressing Appearance Intact 09/21/22 1027   Drainage Amount None 09/21/22 1027   Appearance Eschar 09/21/22 1027   Tissue loss description Full thickness 09/21/22 1027   Black (%), Wound Tissue Color 0 % 09/21/22 1027   Red (%), Wound Tissue Color 0 % 09/21/22 1027   Yellow (%), Wound Tissue Color 0 % 09/21/22 1027   Periwound Area Intact 09/21/22 1027   Wound Edges Undefined 09/21/22 1027   Wound Length (cm) 0.3 cm 09/21/22 1027   Wound Width (cm) 0.8 cm 09/21/22 1027   Wound Depth (cm) 0 cm 09/21/22 1027   Wound Volume (cm^3) 0 cm^3 09/21/22 1027   Wound Surface Area (cm^2) 0.24 cm^2 09/21/22 1027   Care Cleansed with:;Wound cleanser;Applied: 09/21/22 1027   Dressing Removed;Changed;Collagen;Silver;Bandaid 09/21/22 1027            (Retired) Wound 09/07/22 1023 Other (comment) medial Sacral Spine (Active)   09/07/22 1023    Pre-existing: Yes   Primary Wound Type: Other   Side:    Orientation: medial   Location: Sacral Spine   Wound Number:    Ankle-Brachial Index:    Pulses:    Removal Indication and Assessment:    Wound Outcome:    (Retired) Wound Type:    (Retired) Wound Length (cm):    (Retired) Wound Width (cm):    (Retired) Depth (cm):    Wound  Description (Comments):    Removal Indications:    Wound Image   09/21/22 1028   Dressing Appearance Intact;Moist drainage 09/21/22 1028   Drainage Amount Moderate 09/21/22 1028   Drainage Characteristics/Odor Yellow;Sanguineous;Malodorous 09/21/22 1028   Appearance Pink;Yellow 09/21/22 1028   Tissue loss description Full thickness 09/21/22 1028   Black (%), Wound Tissue Color 0 % 09/21/22 1028   Red (%), Wound Tissue Color 80 % 09/21/22 1028   Yellow (%), Wound Tissue Color 20 % 09/21/22 1028   Periwound Area Intact 09/21/22 1028   Wound Edges Defined 09/21/22 1028   Wound Length (cm) 4.2 cm 09/21/22 1028   Wound Width (cm) 2 cm 09/21/22 1028   Wound Depth (cm) 1.9 cm 09/21/22 1028   Wound Volume (cm^3) 15.96 cm^3 09/21/22 1028   Wound Surface Area (cm^2) 8.4 cm^2 09/21/22 1028   Care Cleansed with:;Wound cleanser;Applied: 09/21/22 1028   Dressing Removed;Changed;Calcium alginate;Silver;Absorptive Pad 09/21/22 1028           Plan:              Lab Results   Component Value Date    HGBA1C 6.3 06/15/2022     Vital Signs  Temp: 98.4 °F (36.9 °C)  Temp src: Oral  Pulse: 109  Resp: 16  BP: 123/75  Pain Score: 10-Worst pain ever  Pain Loc: Buttocks  Height and Weight  Height: 5' (152.4 cm)  Weight: 68 kg (150 lb)  BSA (Calculated - sq m): 1.7 sq meters  BMI (Calculated): 29.3  Weight in (lb) to have BMI = 25: 127.7]  I reviewed her foot film that does have possible sharif changes related to osteomyelitis noted. Patient is aware but does  not want to be more aggressive with her care.   I debrided her wounds and tried to see if I could find any reason for her to have buttock pain besides due to the vac. I really cant go down on mmHg and I also would prefer not to make it intermittent. I suggested to hold it for one week and see if pain improved but she does not want to do this. I also can not write her for any more pain meds as she has a lot already. I suggested to try Voltaren gel to outer buttock area around vac set up  and see if helps.  3. I found not drainage from foot or buttock wounds. We discussed the  importance of offloading her butt and good hygiene.  4. I also discussed the Ensure samples we gave her and whether she is supplementing her diet with some kind of protein. Her daughter says she is trying.  5. Discussed concerns with nicotine on wound healing.  6. I will have her come back in 2 weeks for debridement.

## 2022-09-22 NOTE — PROCEDURES
"Debridement    Date/Time: 9/21/2022 9:00 AM  Performed by: Marlys Velazquez DO  Authorized by: Marlys Velazquez DO     Time out: Immediately prior to procedure a "time out" was called to verify the correct patient, procedure, equipment, support staff and site/side marked as required.    Consent Done?:  Yes (Verbal)    Preparation: Patient was prepped and draped in usual sterile fashion    Local anesthesia used?: Yes    Local anesthetic:  Topical anesthetic    Wound Details:    Location:  Right foot    Location:  Right 1st Toe    Type of Debridement:  Excisional       Length (cm):  0.3       Area (sq cm):  0.24       Width (cm):  0.8       Percent Debrided (%):  100       Depth (cm):  0.4       Total Area Debrided (sq cm):  0.24    Depth of debridement:  Subcutaneous tissue    Tissue debrided:  Subcutaneous    Devitalized tissue debrided:  Biofilm, Exudate and Slough    Instruments:  Curette    2nd Wound Details:     Debridement - 2nd Wound - General Location: sacrum /buttock.    Type of Debridement:  Excisional       Length (cm):  4.2       Area (sq cm):  8.4       Width (cm):  2       Percent Debrided (%):  100       Depth (cm):  1.9       Total Area Debrided (sq cm):  8.4    Depth of debridement:  Subcutaneous tissue    Tissue debrided:  Subcutaneous and Adipose    Devitalized tissue debrided:  Biofilm and Slough    Instruments:  Curette    Bleeding:  Minimal  Hemostasis Achieved: Yes    Method Used:  Pressure  Patient tolerance:  Patient tolerated the procedure well with no immediate complications     Curette used to debride toe wound, goes straight down to touch bone, slough and exudate removed.  Scraped areas of undermining along both sides of large wound bed. Pink, fleshy tissue exposed that bleeds well.  "

## 2022-09-27 ENCOUNTER — DOCUMENT SCAN (OUTPATIENT)
Dept: HOME HEALTH SERVICES | Facility: HOSPITAL | Age: 72
End: 2022-09-27
Payer: MEDICARE

## 2022-09-28 ENCOUNTER — HOSPITAL ENCOUNTER (OUTPATIENT)
Dept: WOUND CARE | Facility: HOSPITAL | Age: 72
Discharge: HOME | End: 2022-09-28
Attending: EMERGENCY MEDICINE
Payer: MEDICARE

## 2022-09-28 VITALS
HEIGHT: 60 IN | BODY MASS INDEX: 29.45 KG/M2 | HEART RATE: 105 BPM | SYSTOLIC BLOOD PRESSURE: 165 MMHG | DIASTOLIC BLOOD PRESSURE: 83 MMHG | RESPIRATION RATE: 18 BRPM | WEIGHT: 150 LBS | TEMPERATURE: 98 F

## 2022-09-28 DIAGNOSIS — E11.621 DIABETIC ULCER OF RIGHT GREAT TOE: ICD-10-CM

## 2022-09-28 DIAGNOSIS — R29.898 WEAKNESS OF BOTH LOWER EXTREMITIES: ICD-10-CM

## 2022-09-28 DIAGNOSIS — R62.7 FAILURE TO THRIVE IN ADULT: ICD-10-CM

## 2022-09-28 DIAGNOSIS — L97.509 TYPE 2 DIABETES MELLITUS WITH FOOT ULCER, WITHOUT LONG-TERM CURRENT USE OF INSULIN: ICD-10-CM

## 2022-09-28 DIAGNOSIS — E11.621 TYPE 2 DIABETES MELLITUS WITH FOOT ULCER, WITHOUT LONG-TERM CURRENT USE OF INSULIN: ICD-10-CM

## 2022-09-28 DIAGNOSIS — L89.154 PRESSURE ULCER OF SACRAL REGION, STAGE 4: Primary | ICD-10-CM

## 2022-09-28 DIAGNOSIS — E46 MALNUTRITION, UNSPECIFIED TYPE: ICD-10-CM

## 2022-09-28 DIAGNOSIS — L97.519 DIABETIC ULCER OF RIGHT GREAT TOE: ICD-10-CM

## 2022-09-28 LAB — POCT GLUCOSE: 170 MG/DL (ref 70–110)

## 2022-09-28 PROCEDURE — 27000999 HC MEDICAL RECORD PHOTO DOCUMENTATION

## 2022-09-28 PROCEDURE — 11042 DBRDMT SUBQ TIS 1ST 20SQCM/<: CPT

## 2022-09-28 NOTE — PATIENT INSTRUCTIONS
"Pt seen today by: Dr Marlys Velazquez    [facility] and self care DRESSING INSTRUCTIONS:    We are ordering a CT scan of the pelvis, after CT is done please hold metformin for 3 days and then draw a BUN and Creatine on patient      Home Health to see pt on Monday and Thursday      Patient and/or family may be asked to assist on other days. Home Health to leave enough supplies for family member to change dressings and demonstrate proper dressing change.    Hold wound vac for one week      Wound location: Sacrum      Cleanse wound with Vashe  Apply sensicare to the skin around the wound  Pack the wound with vasche moistened gauze  Cover with exudry dressing and secure with cover roll tape  Change dressing daily or if soiled or not intact      Wound location: Right great toe    Cleanse wound with Vashe  Apply promogran to wound bed  Cover with a band-aid (if staying too moist then cover with a small piece of abd pad and wrap with 1" bridger)  Dressing to be changed on Monday and Thursday    Return visit: October 5th 2022 at10:00 am      Wound may have been debrided in clinic: if so, WHAT YOU NEED TO KNOW:    Debridement is the removal of infected, damaged, or dead tissue so a wound can heal properly. Your wound may need more than one debridement. Debridement can cause bleeding, and a small amount of blood is expected.  AFTER A DEBRIDEMENT:    Keep your wound clean and dry. Do not remove the dressing unless instructed.  Follow the wound care orders provided to you or your home health care provider.  If you have pain, take over the counter pain relievers or pain medication if prescribed.  Elevate the wound and limit excessive activity to prevent bleeding and/or swelling in your wound.  If you see blood coming through the dressing, apply gauze and tape over the dressing and hold firm pressure to the wound with your hand for 5-10 minutes continuously, without peeking, to help the bleeding stop.  Contact " [facility] wound care team at [phone] or go to the nearest Emergency department if:    You have a fever greater than 101 taken by mouth.  Your pain gets worse or does not go away, even after taking your regular pain medicine.  Your skin around your wound is red, hot, swollen, or draining pus.  You have bleeding that continues to come through the dressing after holding pressure for 10 minutes       Compression: You may be using a compressive type of dressings to control edema: If so, keep your compression wrap or tubigrip in place. Do not get them wet, they should feel snug. If they feel tight, or cause pain in any way,  elevate your legs above your heart for 15 minutes. If the wraps still cause pain or you can not tolerate compression,  please remove and notify the clinic or your home health.     Nutrition:  The current daily value (%DV) for protein is 50 grams per day and is meant as a general goal for most people. Further increasing your dietary protein intake is very important for wound healing. Typically one needs over 100g of protein per day to help with wound healing needs.  If you are a dialysis patient or have problems with your kidneys, talk to your Nephrologist about how much protein you can take in with your condition.  Examples of high protein items that can be added to your diet include: eggs, chicken, red meats, almonds, cottage cheese, Greek yogurt, beans, and peanut butter.  Fortified protein bars, shakes and drinks can add 15-30 additional grams of protein per serving.   Also add:   1 daily general multivitamin   Adalberto : 1 packet twice daily   Vitamin C : 500mg twice daily   Zinc 220 mg daily  Vit D : once daily    Offloading   Offload your wound. This means to reduce pressure on and around the wound that reduces blood flow to the wound and prevents healing. Your wound care team will discuss specific ways for you to offload your specific wound. Common offloading strategies include:    Turn or  reposition every 2 hours or sooner  Use pillows, wedges, ROHO wheelchair cushions or other special devices like boots and shoes to lift the wound off of hard surfaces  Alternating Low Air-loss (ALAL) mattress may be ordered  Padded dressings can reduce wound pressure        Call our [facility] wound clinic for questions/concerns a [phone] .

## 2022-09-29 ENCOUNTER — DOCUMENT SCAN (OUTPATIENT)
Dept: HOME HEALTH SERVICES | Facility: HOSPITAL | Age: 72
End: 2022-09-29
Payer: MEDICARE

## 2022-10-02 ENCOUNTER — DOCUMENT SCAN (OUTPATIENT)
Dept: HOME HEALTH SERVICES | Facility: HOSPITAL | Age: 72
End: 2022-10-02
Payer: MEDICARE

## 2022-10-02 NOTE — PROCEDURES
"Debridement    Date/Time: 9/28/2022 11:00 AM  Performed by: Marlys Velazquez DO  Authorized by: Marlys Velazquez DO     Time out: Immediately prior to procedure a "time out" was called to verify the correct patient, procedure, equipment, support staff and site/side marked as required.    Consent Done?:  Yes (Verbal)    Preparation: Patient was prepped and draped in usual sterile fashion    Local anesthesia used?: Yes    Local anesthetic:  Topical anesthetic    Wound Details:    Location:  Right foot    Location:  Right 1st Toe    Type of Debridement:  Excisional       Length (cm):  0.2       Area (sq cm):  0.08       Width (cm):  0.4       Percent Debrided (%):  100       Depth (cm):  0.3       Total Area Debrided (sq cm):  0.08    Depth of debridement:  Subcutaneous tissue    Tissue debrided:  Subcutaneous    Devitalized tissue debrided:  Slough and Exudate    Instruments:  Curette    2nd Wound Details:     Debridement - 2nd Wound - General Location: sacrum.    Type of Debridement:  Excisional       Length (cm):  5       Area (sq cm):  7.5       Width (cm):  1.5       Percent Debrided (%):  100       Depth (cm):  2.2       Total Area Debrided (sq cm):  7.5    Depth of debridement:  Subcutaneous tissue    Tissue debrided:  Subcutaneous    Devitalized tissue debrided:  Biofilm, Slough and Exudate    Instruments:  Curette    Bleeding:  Minimal  Hemostasis Achieved: Yes    Method Used:  Pressure  Patient tolerance:  Patient tolerated the procedure well with no immediate complications     Curette used to probe wound bed to bleeding tissue. Bone struck with curette  Curette used to debrided wound bed till bleeding /pink bed with undermining, pink irasema-wound skin around wound   "

## 2022-10-02 NOTE — PROGRESS NOTES
Subjective:       Patient ID: Niurka Mata is a 72 y.o. female.    Chief Complaint: No chief complaint on file.    HPI      Chief Complaint: No chief complaint on file.    HPI  Pressure Ulcer  72 yr female well known to our clinic for prior wound issue due to parapesis and wheelchair status. Ms. Hughes returned first week of September for a large sacral wound that she has had for atleast 6 months as well as a wound to the dorsal side of her great toe on right foot just below the nail. She restarted smoking for her 72th birthday. A PAB ordered at clinic this month returned at 8.0; thus we have several obstacles impeding her healing status.  No recent fever or chills. On last week's  visit she c/o of buttock pain over her wound bed as well as just touching the skin of her buttock. There is no redness or warmth or areas of induration or fluctuance. She is already on Lortab and gabapentin.    ROS:  General: afebrile, no chills, + weakness  ENT: no sore throat but did have some difficulty swallowing, no congestion  Eye: + glasses, no new vision loss  Lungs: No cough or sob  Cv: no chest pain or chf  GI : no nausea or vomiting / no diarrhea at this time    + UTIs, frequency  Musc: weakness in lower legs / chronic back pain with hx compression fx.  Neuro: + neuropathy, no stroke   Skin: + wounds to right great toe, sacrum. Neuralgia over buttock skin    Objective:    Vital Signs  Temp: 98.4 °F (36.9 °C)  Temp src: Oral  Pulse: 105  Resp: 18  BP: (!) 165/83  Pain Score:   8  Pain Loc: Buttocks (buttocks)  Height and Weight  Height: 5' (152.4 cm)  Weight: 68 kg (150 lb)  BSA (Calculated - sq m): 1.7 sq meters  BMI (Calculated): 29.3  Weight in (lb) to have BMI = 25: 127.7]      Physical Exam  Vitals and nursing note reviewed.   Constitutional:       Appearance: She is normal weight.   HENT:      Head: Normocephalic and atraumatic.      Nose: Nose normal.      Mouth/Throat:      Mouth: Mucous membranes are moist.    Eyes:      Extraocular Movements: Extraocular movements intact.      Pupils: Pupils are equal, round, and reactive to light.   Cardiovascular:      Rate and Rhythm: Normal rate and regular rhythm.      Pulses: Normal pulses.      Heart sounds: Normal heart sounds.   Pulmonary:      Effort: Pulmonary effort is normal.      Breath sounds: Normal breath sounds.   Abdominal:      General: Abdomen is flat.   Musculoskeletal:      Cervical back: Normal range of motion.      Comments: M atrophy LE but passive ROM   Skin:     Capillary Refill: Capillary refill takes 2 to 3 seconds.      Findings: Lesion (large pink sacral wound with undermining/ pink tissue irasema-wound. right great toe with ulcer, +bone  scraped with curette.    Comments: Left heel with scar noted but no open wound   Neurological:      Mental Status: She is alert and oriented to person, place, and time. Mental status is at baseline.      Motor: Weakness present.      Gait: Gait abnormal.   Psychiatric:         Mood and Affect: Mood normal.        Sacral pressure ulcer, stage IV: 2020, returned early 2022, return to clinic 8/31/22  Right dorsal great toe DFU: exposed bone/joint capsule noted 9/7/22  Prior left heel diabetic/pressure ulcer 2020  Severe BLE PAD:  previous stents  2019 with CIS/[place]; [place] US early May 2020:  high grade stenosis bilateral common fem arteries.  CIS 11/4/20: Balloon angioplasty Laser atherectomy of Left SFA pop segment and Balloon angioplasty Left Ana Cristina Fleming  Diabetes with neuropathy, hemoglobin A1c  May 2020: > 14, Down to 7.0 August 2020: will order one today.  bilateral lower extremity lymphedema with chronic stasis changes.  Malnutrition, prealbumin  17.0 September 2020, 8.0 9/7/22  Deconditioning, and failure to thrive  Hypertension  Dyslipidemia  CAD  Heavy/chronic smoker  Bone and swab culture neg for acute bacterial infection 9/7/22       Assessment:       1. Pressure ulcer of sacral region, stage 4    2. Diabetic  ulcer of right great toe    3. Weakness of both lower extremities    4. Failure to thrive in adult    5. Malnutrition, unspecified type    6. Type 2 diabetes mellitus with foot ulcer, without long-term current use of insulin           Altered Skin Integrity 09/07/22 1023 Right dorsal Toe, first #3 Diabetic Ulcer (Active)   09/07/22 1023   Altered Skin Integrity Present on Admission: yes   Side: Right   Orientation: dorsal   Location: Toe, first   Wound Number: #3   Is this injury device related?: No   Primary Wound Type: Diabetic ulc   Description of Altered Skin Integrity:    Ankle-Brachial Index: 8/31/22 R dp 100 pt 104   Pulses: non-palpable x 4 - + Doppler monophasic x 4   Removal Indication and Assessment:    Wound Outcome:    (Retired) Wound Length (cm):    (Retired) Wound Width (cm):    (Retired) Depth (cm):    Wound Description (Comments):    Removal Indications:    Wound Image   09/28/22 1151   Description of Altered Skin Integrity Full thickness tissue loss with exposed bone, tendon, or muscle. Often includes undermining and tunneling. May extend into muscle and/or supporting structures. 09/28/22 1151   Dressing Appearance Intact;Dried drainage 09/28/22 1151   Drainage Amount Small 09/28/22 1151   Drainage Characteristics/Odor Yellow;Serosanguineous 09/28/22 1151   Appearance Yellow;Pink 09/28/22 1151   Tissue loss description Full thickness 09/28/22 1151   Black (%), Wound Tissue Color 0 % 09/28/22 1151   Red (%), Wound Tissue Color 90 % 09/28/22 1151   Yellow (%), Wound Tissue Color 10 % 09/28/22 1151   Periwound Area Intact 09/28/22 1151   Wound Edges Defined 09/28/22 1151   Wound Length (cm) 0.2 cm 09/28/22 1151   Wound Width (cm) 0.4 cm 09/28/22 1151   Wound Depth (cm) 0.3 cm 09/28/22 1151   Wound Volume (cm^3) 0.024 cm^3 09/28/22 1151   Wound Surface Area (cm^2) 0.08 cm^2 09/28/22 1151   Care Cleansed with:;Wound cleanser;Applied: 09/28/22 1151   Dressing Removed;Changed;Collagen;Silver;Absorptive  "Pad;Rolled gauze 09/28/22 1151            (Retired) Wound 09/07/22 1023 Other (comment) medial Sacral Spine (Active)   09/07/22 1023    Pre-existing: Yes   Primary Wound Type: Other   Side:    Orientation: medial   Location: Sacral Spine   Wound Number:    Ankle-Brachial Index:    Pulses:    Removal Indication and Assessment:    Wound Outcome:    (Retired) Wound Type:    (Retired) Wound Length (cm):    (Retired) Wound Width (cm):    (Retired) Depth (cm):    Wound Description (Comments):    Removal Indications:    Wound Image   09/28/22 1153   Dressing Appearance Intact;Moist drainage 09/28/22 1153   Drainage Amount Moderate 09/28/22 1153   Drainage Characteristics/Odor Yellow;Serosanguineous;Malodorous 09/28/22 1153   Appearance Pink;Yellow 09/28/22 1153   Tissue loss description Full thickness 09/28/22 1153   Black (%), Wound Tissue Color 0 % 09/28/22 1153   Red (%), Wound Tissue Color 80 % 09/28/22 1153   Yellow (%), Wound Tissue Color 10 % 09/28/22 1153   Periwound Area Intact 09/28/22 1153   Wound Edges Defined 09/28/22 1153   Wound Length (cm) 5 cm 09/28/22 1153   Wound Width (cm) 1.5 cm 09/28/22 1153   Wound Depth (cm) 2.2 cm 09/28/22 1153   Wound Volume (cm^3) 16.5 cm^3 09/28/22 1153   Wound Surface Area (cm^2) 7.5 cm^2 09/28/22 1153   Undermining (depth (cm)/location) 3.2 between 7-2:00 09/28/22 1153   Care Cleansed with:;Wound cleanser;Applied: 09/28/22 1153   Dressing Removed;Changed;Absorptive Pad 09/28/22 1153           Plan:              Lab Results   Component Value Date    HGBA1C 6.3 06/15/2022     I spoke with patient and family about her "pain" in buttock which is out of proportion to my findings. I have ordered a MRI of her pelvis to see if she has an underlying abscess or something else going on. She is not compliant with her offloading and is still wearing diapers. I will order inflammatory markers as well at next visit.  I debrided her wounds and made recommendations to hold NPWT for this " week.  Continue to offload / given protein supplements and advised to drink 2 / day on daily basis.   Toe wound is still with bone exposed on probing. I gave her instructions on wound care and off loading.  I will have her return in one week.

## 2022-10-05 ENCOUNTER — HOSPITAL ENCOUNTER (OUTPATIENT)
Dept: WOUND CARE | Facility: HOSPITAL | Age: 72
Discharge: HOME | End: 2022-10-05
Attending: EMERGENCY MEDICINE
Payer: MEDICARE

## 2022-10-05 VITALS
TEMPERATURE: 98 F | DIASTOLIC BLOOD PRESSURE: 84 MMHG | BODY MASS INDEX: 29.45 KG/M2 | RESPIRATION RATE: 16 BRPM | HEIGHT: 60 IN | WEIGHT: 150 LBS | SYSTOLIC BLOOD PRESSURE: 139 MMHG | HEART RATE: 101 BPM

## 2022-10-05 DIAGNOSIS — E11.621 DIABETIC ULCER OF LEFT HEEL ASSOCIATED WITH TYPE 2 DIABETES MELLITUS, LIMITED TO BREAKDOWN OF SKIN: Chronic | ICD-10-CM

## 2022-10-05 DIAGNOSIS — L97.519 DIABETIC ULCER OF RIGHT GREAT TOE: ICD-10-CM

## 2022-10-05 DIAGNOSIS — A04.72 C. DIFFICILE COLITIS: Chronic | ICD-10-CM

## 2022-10-05 DIAGNOSIS — L98.429 STAGE 3 SKIN ULCER OF SACRAL REGION: ICD-10-CM

## 2022-10-05 DIAGNOSIS — R29.898 WEAKNESS OF BOTH LOWER EXTREMITIES: ICD-10-CM

## 2022-10-05 DIAGNOSIS — E46 MALNUTRITION, UNSPECIFIED TYPE: ICD-10-CM

## 2022-10-05 DIAGNOSIS — E66.9 DIABETES MELLITUS TYPE 2 IN OBESE: Chronic | ICD-10-CM

## 2022-10-05 DIAGNOSIS — E11.69 DIABETES MELLITUS TYPE 2 IN OBESE: Chronic | ICD-10-CM

## 2022-10-05 DIAGNOSIS — L89.154 PRESSURE ULCER OF SACRAL REGION, STAGE 4: Primary | ICD-10-CM

## 2022-10-05 DIAGNOSIS — L97.509 TYPE 2 DIABETES MELLITUS WITH FOOT ULCER, WITHOUT LONG-TERM CURRENT USE OF INSULIN: ICD-10-CM

## 2022-10-05 DIAGNOSIS — L97.421 DIABETIC ULCER OF LEFT HEEL ASSOCIATED WITH TYPE 2 DIABETES MELLITUS, LIMITED TO BREAKDOWN OF SKIN: Chronic | ICD-10-CM

## 2022-10-05 DIAGNOSIS — I73.9 PERIPHERAL VASCULAR DISEASE: ICD-10-CM

## 2022-10-05 DIAGNOSIS — E11.621 TYPE 2 DIABETES MELLITUS WITH FOOT ULCER, WITHOUT LONG-TERM CURRENT USE OF INSULIN: ICD-10-CM

## 2022-10-05 DIAGNOSIS — E11.621 DIABETIC ULCER OF RIGHT GREAT TOE: ICD-10-CM

## 2022-10-05 LAB
ALBUMIN SERPL-MCNC: 2.4 GM/DL (ref 3.4–4.8)
ALBUMIN/GLOB SERPL: 0.6 RATIO (ref 1.1–2)
ALP SERPL-CCNC: 90 UNIT/L (ref 40–150)
ALT SERPL-CCNC: 7 UNIT/L (ref 0–55)
AST SERPL-CCNC: 8 UNIT/L (ref 5–34)
BASOPHILS # BLD AUTO: 0.09 X10(3)/MCL (ref 0–0.2)
BASOPHILS NFR BLD AUTO: 0.7 %
BILIRUBIN DIRECT+TOT PNL SERPL-MCNC: 0.3 MG/DL
BUN SERPL-MCNC: 9 MG/DL (ref 9.8–20.1)
CALCIUM SERPL-MCNC: 8.9 MG/DL (ref 8.4–10.2)
CHLORIDE SERPL-SCNC: 99 MMOL/L (ref 98–107)
CO2 SERPL-SCNC: 26 MMOL/L (ref 23–31)
CREAT SERPL-MCNC: 0.64 MG/DL (ref 0.55–1.02)
CRP SERPL-MCNC: 13.6 MG/L
EOSINOPHIL # BLD AUTO: 0.2 X10(3)/MCL (ref 0–0.9)
EOSINOPHIL NFR BLD AUTO: 1.6 %
ERYTHROCYTE [DISTWIDTH] IN BLOOD BY AUTOMATED COUNT: 14.6 % (ref 11.5–17)
ERYTHROCYTE [SEDIMENTATION RATE] IN BLOOD: 83 MM/HR (ref 0–20)
GFR SERPLBLD CREATININE-BSD FMLA CKD-EPI: >60 MLS/MIN/1.73/M2
GLOBULIN SER-MCNC: 4.2 GM/DL (ref 2.4–3.5)
GLUCOSE SERPL-MCNC: 167 MG/DL (ref 82–115)
HCT VFR BLD AUTO: 39 % (ref 37–47)
HGB BLD-MCNC: 12.8 GM/DL (ref 12–16)
IMM GRANULOCYTES # BLD AUTO: 0.08 X10(3)/MCL (ref 0–0.04)
IMM GRANULOCYTES NFR BLD AUTO: 0.6 %
LYMPHOCYTES # BLD AUTO: 2.53 X10(3)/MCL (ref 0.6–4.6)
LYMPHOCYTES NFR BLD AUTO: 19.7 %
MCH RBC QN AUTO: 27.7 PG (ref 27–31)
MCHC RBC AUTO-ENTMCNC: 32.8 MG/DL (ref 33–36)
MCV RBC AUTO: 84.4 FL (ref 80–94)
MONOCYTES # BLD AUTO: 0.85 X10(3)/MCL (ref 0.1–1.3)
MONOCYTES NFR BLD AUTO: 6.6 %
NEUTROPHILS # BLD AUTO: 9.1 X10(3)/MCL (ref 2.1–9.2)
NEUTROPHILS NFR BLD AUTO: 70.8 %
NRBC BLD AUTO-RTO: 0 %
PLATELET # BLD AUTO: 491 X10(3)/MCL (ref 130–400)
PMV BLD AUTO: 8.7 FL (ref 7.4–10.4)
POCT GLUCOSE: 163 MG/DL (ref 70–110)
POTASSIUM SERPL-SCNC: 3.9 MMOL/L (ref 3.5–5.1)
PROT SERPL-MCNC: 6.6 GM/DL (ref 5.8–7.6)
RBC # BLD AUTO: 4.62 X10(6)/MCL (ref 4.2–5.4)
SODIUM SERPL-SCNC: 133 MMOL/L (ref 136–145)
WBC # SPEC AUTO: 12.8 X10(3)/MCL (ref 4.5–11.5)

## 2022-10-05 PROCEDURE — 85651 RBC SED RATE NONAUTOMATED: CPT | Performed by: EMERGENCY MEDICINE

## 2022-10-05 PROCEDURE — 36415 COLL VENOUS BLD VENIPUNCTURE: CPT | Performed by: EMERGENCY MEDICINE

## 2022-10-05 PROCEDURE — 80053 COMPREHEN METABOLIC PANEL: CPT | Performed by: EMERGENCY MEDICINE

## 2022-10-05 PROCEDURE — 11042 DBRDMT SUBQ TIS 1ST 20SQCM/<: CPT

## 2022-10-05 PROCEDURE — 86140 C-REACTIVE PROTEIN: CPT | Performed by: EMERGENCY MEDICINE

## 2022-10-05 PROCEDURE — 27000999 HC MEDICAL RECORD PHOTO DOCUMENTATION

## 2022-10-05 PROCEDURE — 85025 COMPLETE CBC W/AUTO DIFF WBC: CPT | Performed by: EMERGENCY MEDICINE

## 2022-10-05 NOTE — PATIENT INSTRUCTIONS
"Pt seen today by: Dr Marlys Velazquez    [facility] and self care DRESSING INSTRUCTIONS:    We are ordering a CT scan of the pelvis, after CT is done please hold metformin for 3 days and then draw a BUN and Creatine on patient      Home Health to see pt on Monday and Thursday      Patient and/or family may be asked to assist on other days. Home Health to leave enough supplies for family member to change dressings and demonstrate proper dressing change.    Hold wound vac for one week      Wound location: Sacrum      Cleanse wound with Vashe  Apply sensicare to the skin around the wound  Pack the wound with vasche moistened gauze  Cover with exudry dressing and secure with cover roll tape  Change dressing daily or if soiled or not intact      Wound location: Right great toe    Cleanse wound with Vashe  Apply promogran to wound bed  Cover with a band-aid (if staying too moist then cover with a small piece of abd pad and wrap with 1" bridger)  Dressing to be changed on Monday and Thursday  Return visit: October 12th 2022 at 10:00 am      Wound may have been debrided in clinic: if so, WHAT YOU NEED TO KNOW:    Debridement is the removal of infected, damaged, or dead tissue so a wound can heal properly. Your wound may need more than one debridement. Debridement can cause bleeding, and a small amount of blood is expected.  AFTER A DEBRIDEMENT:    Keep your wound clean and dry. Do not remove the dressing unless instructed.  Follow the wound care orders provided to you or your home health care provider.  If you have pain, take over the counter pain relievers or pain medication if prescribed.  Elevate the wound and limit excessive activity to prevent bleeding and/or swelling in your wound.  If you see blood coming through the dressing, apply gauze and tape over the dressing and hold firm pressure to the wound with your hand for 5-10 minutes continuously, without peeking, to help the bleeding stop.  Contact " [facility] wound care team at [phone] or go to the nearest Emergency department if:    You have a fever greater than 101 taken by mouth.  Your pain gets worse or does not go away, even after taking your regular pain medicine.  Your skin around your wound is red, hot, swollen, or draining pus.  You have bleeding that continues to come through the dressing after holding pressure for 10 minutes       Compression: You may be using a compressive type of dressings to control edema: If so, keep your compression wrap or tubigrip in place. Do not get them wet, they should feel snug. If they feel tight, or cause pain in any way,  elevate your legs above your heart for 15 minutes. If the wraps still cause pain or you can not tolerate compression,  please remove and notify the clinic or your home health.     Nutrition:  The current daily value (%DV) for protein is 50 grams per day and is meant as a general goal for most people. Further increasing your dietary protein intake is very important for wound healing. Typically one needs over 100g of protein per day to help with wound healing needs.  If you are a dialysis patient or have problems with your kidneys, talk to your Nephrologist about how much protein you can take in with your condition.  Examples of high protein items that can be added to your diet include: eggs, chicken, red meats, almonds, cottage cheese, Greek yogurt, beans, and peanut butter.  Fortified protein bars, shakes and drinks can add 15-30 additional grams of protein per serving.   Also add:   1 daily general multivitamin   Adalberto : 1 packet twice daily   Vitamin C : 500mg twice daily   Zinc 220 mg daily  Vit D : once daily    Offloading   Offload your wound. This means to reduce pressure on and around the wound that reduces blood flow to the wound and prevents healing. Your wound care team will discuss specific ways for you to offload your specific wound. Common offloading strategies include:    Turn or  reposition every 2 hours or sooner  Use pillows, wedges, ROHO wheelchair cushions or other special devices like boots and shoes to lift the wound off of hard surfaces  Alternating Low Air-loss (ALAL) mattress may be ordered  Padded dressings can reduce wound pressure        Call our [facility] wound clinic for questions/concerns a [phone] .

## 2022-10-06 ENCOUNTER — HOSPITAL ENCOUNTER (OUTPATIENT)
Dept: RADIOLOGY | Facility: HOSPITAL | Age: 72
Discharge: HOME | End: 2022-10-06
Attending: EMERGENCY MEDICINE
Payer: MEDICARE

## 2022-10-06 DIAGNOSIS — L89.154 PRESSURE ULCER OF SACRAL REGION, STAGE 4: ICD-10-CM

## 2022-10-06 PROCEDURE — 72193 CT PELVIS W/DYE: CPT | Mod: TC

## 2022-10-06 PROCEDURE — 25500020 PHARM REV CODE 255: Performed by: EMERGENCY MEDICINE

## 2022-10-06 RX ADMIN — IOPAMIDOL 100 ML: 755 INJECTION, SOLUTION INTRAVENOUS at 11:10

## 2022-10-10 PROBLEM — M86.10 ACUTE OSTEOMYELITIS: Status: ACTIVE | Noted: 2022-10-10

## 2022-10-10 NOTE — PROGRESS NOTES
Subjective:       Patient ID: Niurka Mata is a 72 y.o. female.    Chief Complaint: Pressure Ulcer    HPI      HPI  Pressure Ulcer  72 yr female well known to our clinic for prior wound issue due to parapesis and wheelchair status. Ms. Hughes returned first week of September for a large sacral wound that she has had for atleast 6 months as well as a wound to the dorsal side of her great toe on right foot just below the nail. She restarted smoking for her 72th birthday. A PAB ordered at clinic this month returned at 8.0; thus we have several obstacles impeding her healing status.  No recent fever or chills. On last week's  visit she c/o of buttock pain over her wound bed as well as just touching the skin of her buttock. There is no redness or warmth or areas of induration or fluctuance. She is already on Lortab and gabapentin.    ROS:  General: afebrile, no chills, + weakness  ENT: no sore throat but did have some difficulty swallowing, no congestion  Eye: + glasses, no new vision loss  Lungs: No cough or sob  Cv: no chest pain or chf  GI : no nausea or vomiting / no diarrhea at this time    + UTIs, frequency  Musc: weakness in lower legs / chronic back pain with hx compression fx.  Neuro: + neuropathy, no stroke   Skin: + wounds to right great toe, sacrum. Neuralgia over buttock skin    Objective:   Vital Signs  Temp: 98.1 °F (36.7 °C)  Temp src: Oral  Pulse: 101  Resp: 16  BP: 139/84  Pain Score: 10-Worst pain ever  Pain Loc: Buttocks  Height and Weight  Height: 5' (152.4 cm)  Weight: 68 kg (150 lb)  BSA (Calculated - sq m): 1.7 sq meters  BMI (Calculated): 29.3  Weight in (lb) to have BMI = 25: 127.7]      Physical Exam  Vitals and nursing note reviewed.   Constitutional:       Appearance: She is normal weight.   HENT:      Head: Normocephalic and atraumatic.      Nose: Nose normal.      Mouth/Throat:      Mouth: Mucous membranes are moist.   Eyes:      Extraocular Movements: Extraocular movements intact.       Pupils: Pupils are equal, round, and reactive to light.   Cardiovascular:      Rate and Rhythm: Normal rate and regular rhythm.      Pulses: Normal pulses.      Heart sounds: Normal heart sounds.   Pulmonary:      Effort: Pulmonary effort is normal.      Breath sounds: Normal breath sounds.   Abdominal:      General: Abdomen is flat.   Musculoskeletal:      Cervical back: Normal range of motion.      Comments: M atrophy LE but passive ROM   Skin:     Capillary Refill: Capillary refill takes 2 to 3 seconds.      Findings: Lesion (large pink sacral wound with undermining/ pink tissue irasema-wound. No induration but very tender to touch,  Right great toe with ulcer, +bone  scraped with curette.    Comments: Left heel with scar noted but no open wound   Neurological:      Mental Status: She is alert and oriented to person, place, and time. Mental status is at baseline.      Motor: Weakness present.      Gait: Gait abnormal.   Psychiatric:         Mood and Affect: Mood depressed   Problem list:  Sacral pressure ulcer, stage IV: 2020, returned early 2022, return to clinic 8/31/22  Right dorsal great toe DFU: exposed bone/joint capsule noted 9/7/22  Prior left heel diabetic/pressure ulcer 2020  Severe BLE PAD:  previous stents  2019 with CIS/[place]; [facility] US early May 2020:  high grade stenosis bilateral common fem arteries.  CIS 11/4/20: Balloon angioplasty Laser atherectomy of Left SFA pop segment and Balloon angioplasty Left Ana Cristina Fleming  Diabetes with neuropathy, hemoglobin A1c  May 2020: > 14, Down to 7.0 August 2020: will order one today.  bilateral lower extremity lymphedema with chronic stasis changes.  Malnutrition, prealbumin  17.0 September 2020, 8.0 9/7/22  Deconditioning, and failure to thrive  Hypertension  Dyslipidemia  CAD  Heavy/chronic smoker  Bone and swab culture neg for acute bacterial infection 9/7/22  CT pelvs 10/6/22- no evidence osteomyelitis noted  Chronic pain         Assessment:       1.  Pressure ulcer of sacral region, stage 4    2. Malnutrition, unspecified type    3. Diabetes mellitus type 2 in obese    4. C. difficile colitis    5. Stage 3 skin ulcer of sacral region    6. Weakness of both lower extremities    7. Diabetic ulcer of right great toe    8. Type 2 diabetes mellitus with foot ulcer, without long-term current use of insulin    9. Diabetic ulcer of left heel associated with type 2 diabetes mellitus, limited to breakdown of skin    10. Peripheral vascular disease           Altered Skin Integrity 09/07/22 1023 Right dorsal Toe, first #3 Diabetic Ulcer (Active)   09/07/22 1023   Altered Skin Integrity Present on Admission: yes   Side: Right   Orientation: dorsal   Location: Toe, first   Wound Number: #3   Is this injury device related?: No   Primary Wound Type: Diabetic ulc   Description of Altered Skin Integrity:    Ankle-Brachial Index: 9/28/22 R dp 100 pt 104   Pulses: non-palpable x 4 - + Doppler monophasic x 4   Removal Indication and Assessment:    Wound Outcome:    (Retired) Wound Length (cm):    (Retired) Wound Width (cm):    (Retired) Depth (cm):    Wound Description (Comments):    Removal Indications:    Wound Image   10/05/22 1038   Description of Altered Skin Integrity Full thickness tissue loss. Subcutaneous fat may be visible but bone, tendon or muscle are not exposed 10/05/22 1038   Dressing Appearance Intact;Dried drainage 10/05/22 1038   Drainage Amount Small 10/05/22 1038   Drainage Characteristics/Odor Yellow;Serosanguineous 10/05/22 1038   Appearance Red;Yellow 10/05/22 1038   Tissue loss description Full thickness 10/05/22 1038   Black (%), Wound Tissue Color 0 % 10/05/22 1038   Red (%), Wound Tissue Color 80 % 10/05/22 1038   Yellow (%), Wound Tissue Color 0 % 10/05/22 1038   Periwound Area Intact 10/05/22 1038   Wound Edges Defined 10/05/22 1038   Wound Length (cm) 0.4 cm 10/05/22 1038   Wound Width (cm) 0.8 cm 10/05/22 1038   Wound Depth (cm) 0.3 cm 10/05/22 1038    Wound Volume (cm^3) 0.096 cm^3 10/05/22 1038   Wound Surface Area (cm^2) 0.32 cm^2 10/05/22 1038   Care Cleansed with:;Wound cleanser;Applied: 10/05/22 1038   Dressing Removed;Changed;Collagen;Silver;Bandaid 10/05/22 1038            (Retired) Wound 09/07/22 1023 Other (comment) medial Sacral Spine #1 (Active)   09/07/22 1023    Pre-existing: Yes   Primary Wound Type: Other   Side:    Orientation: medial   Location: Sacral Spine   Wound Number: #1   Ankle-Brachial Index:    Pulses:    Removal Indication and Assessment:    Wound Outcome:    (Retired) Wound Type:    (Retired) Wound Length (cm):    (Retired) Wound Width (cm):    (Retired) Depth (cm):    Wound Description (Comments):    Removal Indications:    Wound Image   10/05/22 1040   Dressing Appearance Open to air 10/05/22 1040   Drainage Amount None 10/05/22 1040   Appearance Pink 10/05/22 1040   Tissue loss description Full thickness 10/05/22 1040   Black (%), Wound Tissue Color 0 % 10/05/22 1040   Red (%), Wound Tissue Color 100 % 10/05/22 1040   Yellow (%), Wound Tissue Color 0 % 10/05/22 1040   Periwound Area Intact 10/05/22 1040   Wound Edges Defined 10/05/22 1040   Wound Length (cm) 4 cm 10/05/22 1040   Wound Width (cm) 1.7 cm 10/05/22 1040   Wound Depth (cm) 1.8 cm 10/05/22 1040   Wound Volume (cm^3) 12.24 cm^3 10/05/22 1040   Wound Surface Area (cm^2) 6.8 cm^2 10/05/22 1040   Undermining (depth (cm)/location) 3.5 between 7-5:00 10/05/22 1040   Care Cleansed with:;Wound cleanser;Applied: 10/05/22 1040   Dressing Removed;Changed;Absorptive Pad 10/05/22 1040           Plan:              Results for orders placed or performed during the hospital encounter of 10/06/22 (from the past 2160 hour(s))   CT Pelvis With Contrast    Impression    Sacral decubitus seen with minimal fluid collection is seen within the decubitus ulcer as outlined above.  Collection seems to communicate with the open wound.      Electronically signed by: Libby  Soledad  Date:    10/06/2022  Time:    12:05     Lab Results   Component Value Date    CRP 13.60 (H) 10/05/2022     Lab Results   Component Value Date    SEDRATE 83 (H) 10/05/2022     Lab Results   Component Value Date    WBC 12.8 (H) 10/05/2022    HGB 12.8 10/05/2022    HCT 39.0 10/05/2022    MCV 84.4 10/05/2022     (H) 10/05/2022   CMP  Sodium Level   Date Value Ref Range Status   10/05/2022 133 (L) 136 - 145 mmol/L Final     Potassium Level   Date Value Ref Range Status   10/05/2022 3.9 3.5 - 5.1 mmol/L Final     Carbon Dioxide   Date Value Ref Range Status   10/05/2022 26 23 - 31 mmol/L Final     Blood Urea Nitrogen   Date Value Ref Range Status   10/05/2022 9.0 (L) 9.8 - 20.1 mg/dL Final     Creatinine   Date Value Ref Range Status   10/05/2022 0.64 0.55 - 1.02 mg/dL Final     Calcium Level Total   Date Value Ref Range Status   10/05/2022 8.9 8.4 - 10.2 mg/dL Final     Albumin Level   Date Value Ref Range Status   10/05/2022 2.4 (L) 3.4 - 4.8 gm/dL Final     Bilirubin Total   Date Value Ref Range Status   10/05/2022 0.3 <=1.5 mg/dL Final     Alkaline Phosphatase   Date Value Ref Range Status   10/05/2022 90 40 - 150 unit/L Final     Aspartate Aminotransferase   Date Value Ref Range Status   10/05/2022 8 5 - 34 unit/L Final     Alanine Aminotransferase   Date Value Ref Range Status   10/05/2022 7 0 - 55 unit/L Final     Estimated GFR-Non    Date Value Ref Range Status   06/20/2022 >60 mls/min/1.73/m2 Final     I debrided her sacral wound which again she has pain out of proportioin to my  findings. Her skin is tender to touch even around the wound but I do not see or feel any area of induration or fluctuance that would be communicating or hidden within wound bed. I am going to hold the vac although her daughter tells me she says pain is no better without it.  She is already on norco and ?morphine by a pain management doctor so I can  not add more oral meds. I did suggest voltaren gel to  skin around wound as a try for some additional help.  Her toe wound is still deep with bone probed but none exposed.   She has had low PAB in recent past and I have given her samples of protein supplements but she is still not willing to commit to them on daily basis.   She is still smoking >1ppd cigarettes as well.  I am very guarded about her wound success prognosis. Her inflammatory markers are high which makes me think she has osteo in the bed but it is possible that they are high from her toe wound as well. However, her bone and tissue cultures grew out normal karlos on September 6,2022.  I could order a triple phase bone scan to see if she has osteo within pelvis if she would be willing to do hyperbarics and 6 weeks of IV antibiotics. She would be best served to go into a LTACH but she has refused this before. I will speak with her and daughter at this visit as well as look at films in event there is some communicating track of fluid I am missing that I can probe and expose.  I will see her back in one week.

## 2022-10-10 NOTE — PROCEDURES
"Debridement    Date/Time: 10/5/2022 10:00 AM  Performed by: Marlys Velazquez DO  Authorized by: Marlys Velazquez DO     Time out: Immediately prior to procedure a "time out" was called to verify the correct patient, procedure, equipment, support staff and site/side marked as required.    Consent Done?:  Yes (Verbal)    Preparation: Patient was prepped and draped in usual sterile fashion    Local anesthesia used?: Yes    Local anesthetic:  Topical anesthetic    Wound Details:    Location:  Right foot    Location:  Right 1st Toe    Type of Debridement:  Excisional       Length (cm):  0.4       Area (sq cm):  0.32       Width (cm):  0.8       Percent Debrided (%):  100       Depth (cm):  0.4       Total Area Debrided (sq cm):  0.32    Depth of debridement:  Subcutaneous tissue    Tissue debrided:  Epidermis, Subcutaneous and Other    Devitalized tissue debrided:  Biofilm and Slough    Instruments:  Curette    2nd Wound Details:     Debridement - 2nd Wound - General Location: buttock.    Type of Debridement:  Excisional       Length (cm):  4       Area (sq cm):  6.8       Width (cm):  1.7       Percent Debrided (%):  100       Depth (cm):  1.8       Total Area Debrided (sq cm):  6.8    Depth of debridement:  Subcutaneous tissue    Tissue debrided:  Subcutaneous and Other    Devitalized tissue debrided:  Biofilm and Slough    Instruments:  Curette    Bleeding:  Minimal  Hemostasis Achieved: Yes    Method Used:  Pressure  Patient tolerance:  Patient tolerated the procedure well with no immediate complications     Curette used to probe toe wound with bone scraped, minimal bleeding   Buttock wound debrided with slough and bioifilm as well as serous fluid removed, very tender, no pockets other than undermining along both sides of midline woundbed that area in shape of butterfly.   "

## 2022-10-12 ENCOUNTER — HOSPITAL ENCOUNTER (OUTPATIENT)
Dept: WOUND CARE | Facility: HOSPITAL | Age: 72
Discharge: HOME | End: 2022-10-12
Attending: EMERGENCY MEDICINE
Payer: MEDICARE

## 2022-10-12 VITALS
DIASTOLIC BLOOD PRESSURE: 73 MMHG | SYSTOLIC BLOOD PRESSURE: 113 MMHG | HEIGHT: 60 IN | WEIGHT: 150 LBS | HEART RATE: 98 BPM | TEMPERATURE: 98 F | RESPIRATION RATE: 20 BRPM | BODY MASS INDEX: 29.45 KG/M2

## 2022-10-12 DIAGNOSIS — L97.509 TYPE 2 DIABETES MELLITUS WITH FOOT ULCER, WITHOUT LONG-TERM CURRENT USE OF INSULIN: ICD-10-CM

## 2022-10-12 DIAGNOSIS — L89.154 PRESSURE ULCER OF SACRAL REGION, STAGE 4: Primary | ICD-10-CM

## 2022-10-12 DIAGNOSIS — E46 MALNUTRITION, UNSPECIFIED TYPE: ICD-10-CM

## 2022-10-12 DIAGNOSIS — L97.519 DIABETIC ULCER OF RIGHT GREAT TOE: ICD-10-CM

## 2022-10-12 DIAGNOSIS — E11.621 TYPE 2 DIABETES MELLITUS WITH FOOT ULCER, WITHOUT LONG-TERM CURRENT USE OF INSULIN: ICD-10-CM

## 2022-10-12 DIAGNOSIS — R29.898 WEAKNESS OF BOTH LOWER EXTREMITIES: ICD-10-CM

## 2022-10-12 DIAGNOSIS — R62.7 FAILURE TO THRIVE IN ADULT: ICD-10-CM

## 2022-10-12 DIAGNOSIS — E11.621 DIABETIC ULCER OF RIGHT GREAT TOE: ICD-10-CM

## 2022-10-12 LAB — POCT GLUCOSE: 423 MG/DL (ref 70–110)

## 2022-10-12 PROCEDURE — 11042 DBRDMT SUBQ TIS 1ST 20SQCM/<: CPT

## 2022-10-12 PROCEDURE — 27000999 HC MEDICAL RECORD PHOTO DOCUMENTATION

## 2022-10-12 NOTE — PATIENT INSTRUCTIONS
"Pt seen today by: Dr Marlys Velazquez    [facility] Home health and self care DRESSING INSTRUCTIONS:    Home Health to see pt on Monday and Thursday      Patient and/or family may be asked to assist on other days. Home Health to leave enough supplies for family member to change dressings and demonstrate proper dressing change.      Ok to restart vac tomorrow (10/13/22)     Wound location: Sacrum      Cleanse wound with Vashe  Apply duoderm to periwound  Apply black sponge into wound bed (be sure to apply sponge into undermining areas between 7:00 - 3:00 oclock (deepest is 3.5 cm at 1:00 oclock)  Set at -125mmHg continuous pressure  Home health to change vac on Mondays and Fridays    If vac is removed for any reason use the following orders:    Apply sensicare to the skin around the wound  Pack the wound with vasche moistened gauze  Cover with exudry dressing and secure with cover roll tape  Change dressing daily or if soiled or not intact      Wound location: Right great toe    Cleanse wound with Vashe  Apply promogran to wound bed  Cover with a band-aid (if staying too moist then cover with a small piece of abd pad and wrap with 1" bridger)  Dressing to be changed on Monday and Thursday    Return visit: October 19th 2022 at 10:30 am      Wound may have been debrided in clinic: if so, WHAT YOU NEED TO KNOW:    Debridement is the removal of infected, damaged, or dead tissue so a wound can heal properly. Your wound may need more than one debridement. Debridement can cause bleeding, and a small amount of blood is expected.  AFTER A DEBRIDEMENT:    Keep your wound clean and dry. Do not remove the dressing unless instructed.  Follow the wound care orders provided to you or your home health care provider.  If you have pain, take over the counter pain relievers or pain medication if prescribed.  Elevate the wound and limit excessive activity to prevent bleeding and/or swelling in your wound.  If you see blood coming " through the dressing, apply gauze and tape over the dressing and hold firm pressure to the wound with your hand for 5-10 minutes continuously, without peeking, to help the bleeding stop.  Contact [facility] wound care team at [phone] or go to the nearest Emergency department if:    You have a fever greater than 101 taken by mouth.  Your pain gets worse or does not go away, even after taking your regular pain medicine.  Your skin around your wound is red, hot, swollen, or draining pus.  You have bleeding that continues to come through the dressing after holding pressure for 10 minutes       Compression: You may be using a compressive type of dressings to control edema: If so, keep your compression wrap or tubigrip in place. Do not get them wet, they should feel snug. If they feel tight, or cause pain in any way,  elevate your legs above your heart for 15 minutes. If the wraps still cause pain or you can not tolerate compression,  please remove and notify the clinic or your home health.     Nutrition:  The current daily value (%DV) for protein is 50 grams per day and is meant as a general goal for most people. Further increasing your dietary protein intake is very important for wound healing. Typically one needs over 100g of protein per day to help with wound healing needs.  If you are a dialysis patient or have problems with your kidneys, talk to your Nephrologist about how much protein you can take in with your condition.  Examples of high protein items that can be added to your diet include: eggs, chicken, red meats, almonds, cottage cheese, Greek yogurt, beans, and peanut butter.  Fortified protein bars, shakes and drinks can add 15-30 additional grams of protein per serving.   Also add:   1 daily general multivitamin   Adalberto : 1 packet twice daily   Vitamin C : 500mg twice daily   Zinc 220 mg daily  Vit D : once daily    Offloading   Offload your wound. This means to reduce pressure on and around the wound that  reduces blood flow to the wound and prevents healing. Your wound care team will discuss specific ways for you to offload your specific wound. Common offloading strategies include:    Turn or reposition every 2 hours or sooner  Use pillows, wedges, ROHO wheelchair cushions or other special devices like boots and shoes to lift the wound off of hard surfaces  Alternating Low Air-loss (ALAL) mattress may be ordered  Padded dressings can reduce wound pressure        Call our [facility] wound clinic for questions/concerns a [phone] .

## 2022-10-14 NOTE — PROCEDURES
"Debridement    Date/Time: 10/12/2022 10:00 AM  Performed by: Marlys Velazquez DO  Authorized by: Marlys Velazquez DO     Time out: Immediately prior to procedure a "time out" was called to verify the correct patient, procedure, equipment, support staff and site/side marked as required.    Consent Done?:  Yes (Verbal)    Preparation: Patient was prepped and draped in usual sterile fashion    Local anesthesia used?: Yes    Local anesthetic:  Topical anesthetic    Wound Details:    Location:  Right foot    Location:  Right 1st Toe    Type of Debridement:  Excisional       Length (cm):  0.3       Area (sq cm):  0.21       Width (cm):  0.7       Percent Debrided (%):  100       Depth (cm):  0.3       Total Area Debrided (sq cm):  0.21    Depth of debridement:  Subcutaneous tissue    Tissue debrided:  Subcutaneous and Epidermis    Devitalized tissue debrided:  Biofilm and Slough    Instruments:  Blade    2nd Wound Details:     Debridement - 2nd Wound - General Location: sacrum.    Type of Debridement:  Excisional       Length (cm):  3.5       Area (sq cm):  5.25       Width (cm):  1.5       Percent Debrided (%):  100       Depth (cm):  2.8       Total Area Debrided (sq cm):  5.25    Depth of debridement:  Subcutaneous tissue    Tissue debrided:  Subcutaneous    Devitalized tissue debrided:  Biofilm, Slough and Exudate    Instruments:  Curette    Bleeding:  Minimal  Hemostasis Achieved: Yes    Method Used:  Pressure  Patient tolerance:  Patient tolerated the procedure well with no immediate complications     Smaller wound bed great toe today with no bone exposed.  Sacral wound debrided and probed with area of tunneling in superior direction. No bone scraped. Large rim of underminming  "

## 2022-10-14 NOTE — PROGRESS NOTES
"Subjective:       Patient ID: Niurka Mata is a 72 y.o. female.    Chief Complaint: Pressure Ulcer (sacrum)      HPI  Pressure Ulcer  72 yr female well known to our clinic for prior wound issue due to parapesis and wheelchair status. Ms. Hughes returned first week of September for a large sacral wound that she has had for atleast 6 months as well as a wound to the dorsal side of her great toe on right foot just below the nail. She restarted smoking for her 72th birthday. A PAB ordered at clinic this month returned at 8.0; thus we have several obstacles impeding her healing status.  No recent fever or chills. On prior few  visits she c/o of buttock pain over her wound bed as well as just touching the skin of her buttock, it was very much out of proportion to what I was finding in her wound bed, thus a  CT with contrast was ordered to r/o abscess track or something deeper in her tissues. The films returned with no evidence of such or osteomyelitis. There is no redness or warmth or areas of induration or fluctuance noted today and "miraculously" , as of yesterday her pain has resolved.  She is already on Lortab and gabapentin. I had also held the vac during her 2 weeks of intense pain.    ROS:  General: afebrile, no chills, + weakness  ENT: no sore throat but did have some difficulty swallowing, no congestion  Eye: + glasses, no new vision loss  Lungs: No cough or sob  Cv: no chest pain or chf  GI : no nausea or vomiting / no diarrhea at this time    + UTIs, frequency  Musc: weakness in lower legs / chronic back pain with hx compression fx.  Neuro: + neuropathy, no stroke   Skin: + wounds to right great toe, sacrum. Neuralgia over buttock skin    Objective:   Vital Signs  Temp: 98 °F (36.7 °C)  Temp src: Oral  Pulse: 98  Resp: 20  BP: 113/73  BP Location: Right arm  Patient Position: Sitting  Pain Score:   3  Pain Loc: Buttocks  Height and Weight  Height: 5' (152.4 cm)  Weight: 68 kg (150 lb)  BSA (Calculated - " sq m): 1.7 sq meters  BMI (Calculated): 29.3  Weight in (lb) to have BMI = 25: 127.7]      Physical Exam  Vitals and nursing note reviewed.   Constitutional:       Appearance: She is normal weight.   HENT:      Head: Normocephalic and atraumatic.      Nose: Nose normal.      Mouth/Throat:      Mouth: Mucous membranes are moist.   Eyes:      Extraocular Movements: Extraocular movements intact.      Pupils: Pupils are equal, round, and reactive to light.   Cardiovascular:      Rate and Rhythm: Normal rate and regular rhythm.      Pulses: Normal pulses.      Heart sounds: Normal heart sounds.   Pulmonary:      Effort: Pulmonary effort is normal.      Breath sounds: Normal breath sounds.   Abdominal:      General: Abdomen is flat.   Musculoskeletal:      Cervical back: Normal range of motion.      Comments: M atrophy LE but passive ROM   Skin:     Capillary Refill: Capillary refill takes 2 to 3 seconds.      Findings: Lesion (large pink sacral wound with undermining/ pink tissue irasema-wound. No induration but very tender to touch,  wound tracks superiorly today in 01:00 direction and there is tunneling. Right great toe with ulcer, +bone  scraped with curette.    Comments: Left heel with scar noted but no open wound   Neurological:      Mental Status: She is alert and oriented to person, place, and time. Mental status is at baseline.      Motor: Weakness present.      Gait: Gait abnormal.   Psychiatric:         Mood and Affect: Mood depressed     Problem list:  Sacral pressure ulcer, stage IV: 2020, returned early 2022, return to clinic 8/31/22  Right dorsal great toe DFU: exposed bone/joint capsule noted 9/7/22  Prior left heel diabetic/pressure ulcer 2020  Severe BLE PAD:  previous stents  2019 with CIS/[facility]; [facility]  early May 2020:  high grade stenosis bilateral common fem arteries.  CIS 11/4/20: Balloon angioplasty Laser atherectomy of Left SFA pop segment and Balloon angioplasty Left KRISTEN-Dr Fleming  Diabetes with  neuropathy, hemoglobin A1c  May 2020: > 14, Down to 7.0 August 2020: will order one today.  bilateral lower extremity lymphedema with chronic stasis changes.  Malnutrition, prealbumin  17.0 September 2020, 8.0 9/7/22  Deconditioning, and failure to thrive  Hypertension  Dyslipidemia  CAD  Heavy/chronic smoker  Bone and swab culture neg for acute bacterial infection 9/7/22  CT pelvs 10/6/22- no evidence osteomyelitis noted  Chronic pain  Review of Systems    Objective:      Physical Exam    Assessment:       1. Pressure ulcer of sacral region, stage 4    2. Type 2 diabetes mellitus with foot ulcer, without long-term current use of insulin    3. Malnutrition, unspecified type    4. Weakness of both lower extremities    5. Diabetic ulcer of right great toe    6. Failure to thrive in adult           Altered Skin Integrity 10/12/22 1144 Right dorsal Toe, first #3 Diabetic Ulcer (Active)   10/12/22 1144   Altered Skin Integrity Present on Admission: yes   Side: Right   Orientation: dorsal   Location: Toe, first   Wound Number: #3   Is this injury device related?: No   Primary Wound Type: Diabetic ulc   Description of Altered Skin Integrity:    Ankle-Brachial Index: 9/28/22 R dp 100 pt 104   Pulses: non-palpable x 4 - + Doppler monophasic x 4   Removal Indication and Assessment:    Wound Outcome:    (Retired) Wound Length (cm):    (Retired) Wound Width (cm):    (Retired) Depth (cm):    Wound Description (Comments):    Removal Indications:    Dressing Appearance Intact;Dried drainage 10/12/22 1144   Drainage Amount Small 10/12/22 1144   Drainage Characteristics/Odor Yellow;No odor 10/12/22 1144   Appearance Eschar;Yellow 10/12/22 1144   Tissue loss description Full thickness 10/12/22 1144   Black (%), Wound Tissue Color 0 % 10/12/22 1144   Red (%), Wound Tissue Color 0 % 10/12/22 1144   Yellow (%), Wound Tissue Color 100 % 10/12/22 1144   Periwound Area Intact;Dry 10/12/22 1144   Wound Edges Defined 10/12/22 1144   Wound  Length (cm) 0.2 cm 10/12/22 1144   Wound Width (cm) 0.7 cm 10/12/22 1144   Wound Depth (cm) 0.3 cm 10/12/22 1144   Wound Volume (cm^3) 0.042 cm^3 10/12/22 1144   Wound Surface Area (cm^2) 0.14 cm^2 10/12/22 1144   Care Cleansed with:;Antimicrobial agent;Applied: 10/12/22 1144   Dressing Applied;Collagen;Silver;Bandaid 10/12/22 1215            (Retired) Wound 10/12/22 1144 Other (comment) medial Sacral Spine #1 (Active)   10/12/22 1144    Pre-existing: Yes   Primary Wound Type: Other   Side:    Orientation: medial   Location: Sacral Spine   Wound Number: #1   Ankle-Brachial Index:    Pulses:    Removal Indication and Assessment:    Wound Outcome:    (Retired) Wound Type:    (Retired) Wound Length (cm):    (Retired) Wound Width (cm):    (Retired) Depth (cm):    Wound Description (Comments):    Removal Indications:    Wound Image   10/12/22 1144   Dressing Appearance Open to air;Intact;Moist drainage 10/12/22 1144   Drainage Amount Moderate 10/12/22 1144   Drainage Characteristics/Odor Yellow;Serosanguineous;No odor 10/12/22 1144   Appearance Red;Yellow 10/12/22 1144   Tissue loss description Full thickness 10/12/22 1144   Black (%), Wound Tissue Color 0 % 10/12/22 1144   Red (%), Wound Tissue Color 90 % 10/12/22 1144   Yellow (%), Wound Tissue Color 10 % 10/12/22 1144   Periwound Area Intact;Dry 10/12/22 1144   Wound Edges Defined 10/12/22 1144   Wound Length (cm) 3.5 cm 10/12/22 1144   Wound Width (cm) 1.5 cm 10/12/22 1144   Wound Depth (cm) 2.8 cm 10/12/22 1144   Wound Volume (cm^3) 14.7 cm^3 10/12/22 1144   Wound Surface Area (cm^2) 5.25 cm^2 10/12/22 1144   Undermining (depth (cm)/location) 3.5 cm b/t 7:00-3:00 oclock 10/12/22 1144   Care Cleansed with:;Antimicrobial agent;Applied: 10/12/22 1144   Dressing Applied;Gauze, wet to dry;Absorptive Pad 10/12/22 1144   Packing packed with 10/12/22 1144   Periwound Care Absorptive dressing applied 10/12/22 1144           Plan:       Results for orders placed or performed  during the hospital encounter of 10/06/22 (from the past 2160 hour(s))   CT Pelvis With Contrast    Impression    Sacral decubitus seen with minimal fluid collection is seen within the decubitus ulcer as outlined above.  Collection seems to communicate with the open wound.      Electronically signed by: Libby Rowe  Date:    10/06/2022  Time:    12:05     Lab Results   Component Value Date    HGBA1C 6.3 06/15/2022           1, patient is awake and smiling today. She looks 100% better than last few visits. Her pain is gone she tells me. I discussed CT results with she and daughter. There was some description about fluid associated with wound, but she tells me she did CT on her hip bc she could not lay flat on butt, so I think this was wound bed secretion pooling affect. I reviewed films myself. Today when I debrided her wound I did probe as deep as I could go and did not get any drainage but did find more depth superiorly around 01:00 direction with tunneling about 2cm.  2. I debrided her toe wound and it does look smaller today.   3. I am going to restart the Vac to her buttock and continue packing her toe.   4. I have encouraged her to eat high protein diet (pab low lat draw).       Daugher says they have found a protein bar she likes, she is eating        Atleat one/day.  5. I will have her return in one week.

## 2022-10-17 ENCOUNTER — TELEPHONE (OUTPATIENT)
Dept: WOUND CARE | Facility: HOSPITAL | Age: 72
End: 2022-10-17
Payer: MEDICARE

## 2022-10-17 NOTE — TELEPHONE ENCOUNTER
Pts daughter states she called Friday and was unable to get into contact with someone about wound vac. Pt was in immense pain and hollering throughout vac therapy. Daughter and Home Health decided to d/c vac therapy. Will f/u in clinic Wednesday 10/19

## 2022-10-19 ENCOUNTER — HOSPITAL ENCOUNTER (OUTPATIENT)
Dept: WOUND CARE | Facility: HOSPITAL | Age: 72
Discharge: HOME | End: 2022-10-19
Attending: EMERGENCY MEDICINE
Payer: MEDICARE

## 2022-10-19 VITALS
RESPIRATION RATE: 20 BRPM | SYSTOLIC BLOOD PRESSURE: 156 MMHG | BODY MASS INDEX: 29.45 KG/M2 | TEMPERATURE: 98 F | HEART RATE: 92 BPM | WEIGHT: 150 LBS | DIASTOLIC BLOOD PRESSURE: 104 MMHG | HEIGHT: 60 IN

## 2022-10-19 DIAGNOSIS — L97.519 DIABETIC ULCER OF RIGHT GREAT TOE: ICD-10-CM

## 2022-10-19 DIAGNOSIS — E11.621 TYPE 2 DIABETES MELLITUS WITH FOOT ULCER, WITHOUT LONG-TERM CURRENT USE OF INSULIN: ICD-10-CM

## 2022-10-19 DIAGNOSIS — L89.154 PRESSURE ULCER OF SACRAL REGION, STAGE 4: Primary | ICD-10-CM

## 2022-10-19 DIAGNOSIS — R62.7 FAILURE TO THRIVE IN ADULT: ICD-10-CM

## 2022-10-19 DIAGNOSIS — E66.9 DIABETES MELLITUS TYPE 2 IN OBESE: ICD-10-CM

## 2022-10-19 DIAGNOSIS — L97.421 DIABETIC ULCER OF LEFT HEEL ASSOCIATED WITH TYPE 2 DIABETES MELLITUS, LIMITED TO BREAKDOWN OF SKIN: ICD-10-CM

## 2022-10-19 DIAGNOSIS — E11.621 DIABETIC ULCER OF RIGHT GREAT TOE: ICD-10-CM

## 2022-10-19 DIAGNOSIS — L97.509 TYPE 2 DIABETES MELLITUS WITH FOOT ULCER, WITHOUT LONG-TERM CURRENT USE OF INSULIN: ICD-10-CM

## 2022-10-19 DIAGNOSIS — R29.898 WEAKNESS OF BOTH LOWER EXTREMITIES: ICD-10-CM

## 2022-10-19 DIAGNOSIS — E11.621 DIABETIC ULCER OF LEFT HEEL ASSOCIATED WITH TYPE 2 DIABETES MELLITUS, LIMITED TO BREAKDOWN OF SKIN: ICD-10-CM

## 2022-10-19 DIAGNOSIS — E46 MALNUTRITION, UNSPECIFIED TYPE: ICD-10-CM

## 2022-10-19 DIAGNOSIS — E11.69 DIABETES MELLITUS TYPE 2 IN OBESE: ICD-10-CM

## 2022-10-19 PROCEDURE — 99214 OFFICE O/P EST MOD 30 MIN: CPT

## 2022-10-19 PROCEDURE — 27000999 HC MEDICAL RECORD PHOTO DOCUMENTATION

## 2022-10-19 NOTE — PROGRESS NOTES
Subjective:       Patient ID: Niurka Mata is a 72 y.o. female.    Chief Complaint: No chief complaint on file.    72-year-old WF referred to this Wound Care Clinic by [facility]/Dr. Haris Newman to help treat chronic pressure ulcers.  Patient has a history of diabetes, coronary artery disease, generalized debility, dyslipidemia,  hypertension, PAD with stents, neuropathy, anemia, vitamin-D deficiency, malnutrition , chronic smoking as well as  chronic sacral ulcer and chronic right toe ulcer  Pt has a h/o of sacral pressure ulcer and a right heel ulcer  back in 2020 and was sent to this wound care clinic for treatment  and required LTACH admission and advanced wound measures and both actually healed and she was discharged in Feb 2021.  In 2022, she had an MI, gallbladder and esophageal issues. She missed too many appointments with Dr Leal so she was fired from his practice.  She re-developed a severe stage IV sacral pressure ulcer as well as a right dorsal toe DFU.  She returned to this clinic on 8/31/22 and  has been managed by Dr Velazquez since then although I did see her once on 9/7/22.Pt told me she  had resumed smoking >1ppd and that she didn't move around much as says she is too weak.  She has proven very difficult to treat.  She has exquisite pain in her sacral ulcer out of proportion to clinical exam and has been unable to use the recommendation of negative pressure wound therapy.  Her pain was such that Dr. Velazquez did order a CT scan of the pelvis a few weeks ago on 10/6/22 but really didn't show anything unusual.   Vac was resumed last week but had to be discontinued a few days later because she developed a significant periwound dermatitis that was causing too much discomfort.      Review of Systems   Constitutional:  Positive for fatigue. Negative for chills and fever. Activity change: chronically sedentary.  HENT: Negative.     Respiratory: Negative.     Cardiovascular:  Negative  for chest pain and palpitations. Leg swelling: chronic BLE edema.  Gastrointestinal: Negative.    Genitourinary: Negative.    Musculoskeletal:  Positive for arthralgias and myalgias.   Skin:  Negative for color change, pallor and rash. Wound: see hpi.  Neurological:  Negative for dizziness, seizures, syncope and headaches. Weakness: diffuse.      Objective:      Vitals:    10/19/22 1125   BP: (!) 156/104   Pulse: 92   Resp: 20   Temp: 98 °F (36.7 °C)     @poctglucose@  No results for input(s): POCTGLUCOSE in the last 24 hours.    Physical Exam  Constitutional:       Appearance: She is underweight. She is ill-appearing. She is not diaphoretic.      Comments: Looks much older than stated age   HENT:      Head: Normocephalic and atraumatic.      Mouth/Throat:      Pharynx: Oropharynx is clear.   Eyes:      Conjunctiva/sclera: Conjunctivae normal.   Cardiovascular:      Pulses: Normal pulses.   Pulmonary:      Effort: Pulmonary effort is normal.   Abdominal:      General: Abdomen is flat.   Musculoskeletal:      Right lower leg: Edema present.      Left lower leg: Edema present.        Legs:         Feet:    Skin:     General: Skin is warm.      Capillary Refill: Capillary refill takes less than 2 seconds.      Coloration: Skin is not jaundiced or pale.      Findings: No bruising or erythema.   Neurological:      General: No focal deficit present.      Mental Status: She is alert and oriented to person, place, and time. Mental status is at baseline.      Motor: Weakness (diffuse) present.   Psychiatric:         Mood and Affect: Mood normal.         Behavior: Behavior is cooperative.            Altered Skin Integrity 10/12/22 1144 Right dorsal Toe, first #3 Diabetic Ulcer (Active)   10/12/22 1144   Altered Skin Integrity Present on Admission: yes   Side: Right   Orientation: dorsal   Location: Toe, first   Wound Number: #3   Is this injury device related?: No   Primary Wound Type: Diabetic [facility]   Description of Altered  Skin Integrity:    Ankle-Brachial Index: 9/28/22 R dp 100 pt 104   Pulses: non-palpable x 4 - + Doppler monophasic x 4   Removal Indication and Assessment:    Wound Outcome:    (Retired) Wound Length (cm):    (Retired) Wound Width (cm):    (Retired) Depth (cm):    Wound Description (Comments):    Removal Indications:    Wound Image   10/19/22 1144   Dressing Appearance Intact;Moist drainage 10/19/22 1144   Drainage Amount Moderate 10/19/22 1144   Drainage Characteristics/Odor Yellow;No odor 10/19/22 1144   Appearance Pink;White 10/19/22 1144   Tissue loss description Full thickness 10/19/22 1144   Black (%), Wound Tissue Color 0 % 10/19/22 1144   Red (%), Wound Tissue Color 80 % 10/19/22 1144   Yellow (%), Wound Tissue Color 20 % 10/19/22 1144   Periwound Area Intact;Dry 10/19/22 1144   Wound Edges Defined 10/19/22 1144   Wound Length (cm) 0.7 cm 10/19/22 1144   Wound Width (cm) 0.2 cm 10/19/22 1144   Wound Depth (cm) 0.2 cm 10/19/22 1144   Wound Volume (cm^3) 0.028 cm^3 10/19/22 1144   Wound Surface Area (cm^2) 0.14 cm^2 10/19/22 1144   Care Cleansed with:;Wound cleanser;Applied: 10/19/22 1144   Dressing Applied;Collagen;Silver;Absorptive Pad 10/19/22 1144   Periwound Care Absorptive dressing applied 10/19/22 1144            (Retired) Wound 10/12/22 1144 Other (comment) medial Sacral Spine #1 (Active)   10/12/22 1144    Pre-existing: Yes   Primary Wound Type: Other   Side:    Orientation: medial   Location: Sacral Spine   Wound Number: #1   Ankle-Brachial Index:    Pulses:    Removal Indication and Assessment:    Wound Outcome:    (Retired) Wound Type:    (Retired) Wound Length (cm):    (Retired) Wound Width (cm):    (Retired) Depth (cm):    Wound Description (Comments):    Removal Indications:    Wound Image   10/19/22 1144   Dressing Appearance Intact;Saturated 10/19/22 1144   Drainage Amount Large 10/19/22 1144   Drainage Characteristics/Odor Yellow;Malodorous 10/19/22 1144   Appearance Red;Yellow 10/19/22 1144    Tissue loss description Full thickness 10/19/22 1144   Black (%), Wound Tissue Color 0 % 10/19/22 1144   Red (%), Wound Tissue Color 75 % 10/19/22 1144   Yellow (%), Wound Tissue Color 25 % 10/19/22 1144   Periwound Area Macerated;Redness 10/19/22 1144   Wound Edges Defined 10/19/22 1144   Wound Length (cm) 4.2 cm 10/19/22 1144   Wound Width (cm) 3.3 cm 10/19/22 1144   Wound Depth (cm) 2.6 cm 10/19/22 1144   Wound Volume (cm^3) 36.036 cm^3 10/19/22 1144   Wound Surface Area (cm^2) 13.86 cm^2 10/19/22 1144   Undermining (depth (cm)/location) 3.4cm b/t 8:00-4:00 oclock 10/19/22 1144   Care Cleansed with:;Wound cleanser;Applied: 10/19/22 1144   Dressing Applied;Calcium alginate;Silver;Absorptive Pad 10/19/22 1144   Periwound Care Moisture barrier applied;Absorptive dressing applied 10/19/22 1144           Assessment:       1. Pressure ulcer of sacral region, stage 4    2. Type 2 diabetes mellitus with foot ulcer, without long-term current use of insulin    3. Malnutrition, unspecified type    4. Weakness of both lower extremities    5. Diabetic ulcer of right great toe    6. Failure to thrive in adult    7. Diabetes mellitus type 2 in obese    8. Diabetic ulcer of left heel associated with type 2 diabetes mellitus, limited to breakdown of skin            Sacral pressure ulcer, stage IV: 2020, returned early 2022, return to clinic 8/31/22  Significant periwound dermatitis rash noted on 10/19/2022  Right dorsal great toe chronic flexion with dorsal DFU: exposed bone/joint capsule noted 9/7/22, cx negative: some improvement  Prior left heel diabetic/pressure ulcer 2020  Severe BLE PAD:  previous stents  2019 with CIS/[place]; [facility] US early May 2020:  high grade stenosis bilateral common fem arteries.  CIS 11/4/20: Balloon angioplasty Laser atherectomy of Left SFA pop segment and Balloon angioplasty Left KRISTEN-Dr Fleming  Diabetes with neuropathy, hemoglobin A1c  May 2020: > 14, Down to 7.0 August 2020: unknown  recent  bilateral lower extremity lymphedema with chronic stasis changes.  Malnutrition, prealbumin  17.0 September 2020  Deconditioning, and failure to thrive  Hypertension  Dyslipidemia  CAD  Heavy/chronic smoker  Lab Results   Component Value Date    WBC 12.8 (H) 10/05/2022    HGB 12.8 10/05/2022    HCT 39.0 10/05/2022    MCV 84.4 10/05/2022     (H) 10/05/2022         CMP  Sodium Level   Date Value Ref Range Status   10/05/2022 133 (L) 136 - 145 mmol/L Final     Potassium Level   Date Value Ref Range Status   10/05/2022 3.9 3.5 - 5.1 mmol/L Final     Carbon Dioxide   Date Value Ref Range Status   10/05/2022 26 23 - 31 mmol/L Final     Blood Urea Nitrogen   Date Value Ref Range Status   10/05/2022 9.0 (L) 9.8 - 20.1 mg/dL Final     Creatinine   Date Value Ref Range Status   10/05/2022 0.64 0.55 - 1.02 mg/dL Final     Calcium Level Total   Date Value Ref Range Status   10/05/2022 8.9 8.4 - 10.2 mg/dL Final     Albumin Level   Date Value Ref Range Status   10/05/2022 2.4 (L) 3.4 - 4.8 gm/dL Final     Bilirubin Total   Date Value Ref Range Status   10/05/2022 0.3 <=1.5 mg/dL Final     Alkaline Phosphatase   Date Value Ref Range Status   10/05/2022 90 40 - 150 unit/L Final     Aspartate Aminotransferase   Date Value Ref Range Status   10/05/2022 8 5 - 34 unit/L Final     Alanine Aminotransferase   Date Value Ref Range Status   10/05/2022 7 0 - 55 unit/L Final     Estimated GFR-Non    Date Value Ref Range Status   06/20/2022 >60 mls/min/1.73/m2 Final     Lab Results   Component Value Date    HGBA1C 6.3 06/15/2022     Results for orders placed or performed during the hospital encounter of 10/06/22 (from the past 2160 hour(s))   CT Pelvis With Contrast    Impression    Sacral decubitus seen with minimal fluid collection is seen within the decubitus ulcer as outlined above.  Collection seems to communicate with the open wound.      Electronically signed by: Libby  Soledad  Date:    10/06/2022  Time:    12:05       EXAMINATION:  XR TOE 2 OR MORE VIEWS RIGHT   FINDINGS:  There is demineralization of the visualized osseous structures more than expected for patient's age     No acute displaced fractures or dislocations.     There are some degenerative changes of the proximal and distal interphalangeal joints with degenerative changes of the tarsal rows and tarsal metatarsal joints articular spaces are otherwise preserved with smooth articular surfaces     No blastic or lytic lesions.     A soft tissue defect is identified by the great toe which might be related to an ulceration     On the provided images there is no evidence of primary and/or secondary signs to suggest the presence of osteomyelitis these, however, might be lacking on plain films and therefore if clinically indicated other imaging modalities might prove helpful for further assessment     Impression:     Changes suggestive of ulceration.     Demineralization of the visualized osseous structures.     Degenerative changes.     No findings on the provided images to suggest osteomyelitis        Electronically signed by: Kenneth Pierce  Date:                                            09/07/2022  Time:                                           13:10  Plan:     Plan of Care:    The VAC has been held because of significant irasema sacral wound dermatitis.  I agree to continue to hold it and to place a zinc oxide based covering on the skin to help this area heel.  If the VAC is to be resumed I recommend that DuoDerm be placed on the skin before the vac drape  Can pack the wound with silver alginate dressings   Offloading: Patient has a medical condition that requires positioning in ways that cannot be obtained in an ordinary bed. Patient also needs frequent repositioning to alleviate pain and to facilitate immediate changes in body position. I am recommending a hospital bed for this patient and also an alternating  pressure mattress due to his current stage IV sacral pressure ulcer and to be part of the comprehensive wound care treatment.   Nutrition: Must have a high protein diet to support wound  healing;   this should be over 100g protein /day (if no kidney issues); Also rec MVI along with vit C, vit D, zinc and Adalberto  Diabetes: must have a strict diabetic diet,  Smoker: must stop smoking: explained the negative impact this has own not only the wounds (delayed healing) but overall health as well  Return to clinic 1 week           The time spent including preparing to see the patient, obtaining patient history and assessment, evaluation of the plan of care, patient/caregiver counseling and education, orders, documentation, coordination of care, and other professional medical management activities for today's encounter was 35 minutes.

## 2022-10-19 NOTE — PATIENT INSTRUCTIONS
"Pt seen today by: Dr Joselin Lake    [facility] health and self care DRESSING INSTRUCTIONS:    Home Health to see pt on Monday and Thursday      Patient and/or family may be asked to assist on other days. Home Health to leave enough supplies for family member to change dressings and demonstrate proper dressing change.      HOLD Vac this week      Wound location: Sacrum    Cleanse wound with vashe  Apply sensicare to reddened skin around the wound  Pack the wound with alginate silver  Cover with exudry dressing and secure with cover roll tape (do not tape reddened area - be sure to cover all of red area with exudry then tape)  Change dressing daily or if soiled or not intact      Wound location: Right great toe    Cleanse wound with Vashe  Apply promogran to wound bed  Cover with a band-aid (if staying too moist then cover with a small piece of abd pad and wrap with 1" bridger)  Dressing to be changed on Monday and Thursday    Return visit: October 26, 2022 at 11:00 am      Wound may have been debrided in clinic: if so, WHAT YOU NEED TO KNOW:    Debridement is the removal of infected, damaged, or dead tissue so a wound can heal properly. Your wound may need more than one debridement. Debridement can cause bleeding, and a small amount of blood is expected.  AFTER A DEBRIDEMENT:    Keep your wound clean and dry. Do not remove the dressing unless instructed.  Follow the wound care orders provided to you or your home health care provider.  If you have pain, take over the counter pain relievers or pain medication if prescribed.  Elevate the wound and limit excessive activity to prevent bleeding and/or swelling in your wound.  If you see blood coming through the dressing, apply gauze and tape over the dressing and hold firm pressure to the wound with your hand for 5-10 minutes continuously, without peeking, to help the bleeding stop.  Contact [facility] wound care team at [phone] or go to the nearest " Emergency department if:    You have a fever greater than 101 taken by mouth.  Your pain gets worse or does not go away, even after taking your regular pain medicine.  Your skin around your wound is red, hot, swollen, or draining pus.  You have bleeding that continues to come through the dressing after holding pressure for 10 minutes       Compression: You may be using a compressive type of dressings to control edema: If so, keep your compression wrap or tubigrip in place. Do not get them wet, they should feel snug. If they feel tight, or cause pain in any way,  elevate your legs above your heart for 15 minutes. If the wraps still cause pain or you can not tolerate compression,  please remove and notify the clinic or your home health.     Nutrition:  The current daily value (%DV) for protein is 50 grams per day and is meant as a general goal for most people. Further increasing your dietary protein intake is very important for wound healing. Typically one needs over 100g of protein per day to help with wound healing needs.  If you are a dialysis patient or have problems with your kidneys, talk to your Nephrologist about how much protein you can take in with your condition.  Examples of high protein items that can be added to your diet include: eggs, chicken, red meats, almonds, cottage cheese, Greek yogurt, beans, and peanut butter.  Fortified protein bars, shakes and drinks can add 15-30 additional grams of protein per serving.   Also add:   1 daily general multivitamin   Adalberto : 1 packet twice daily   Vitamin C : 500mg twice daily   Zinc 220 mg daily  Vit D : once daily    Offloading   Offload your wound. This means to reduce pressure on and around the wound that reduces blood flow to the wound and prevents healing. Your wound care team will discuss specific ways for you to offload your specific wound. Common offloading strategies include:    Turn or reposition every 2 hours or sooner  Use pillows, wedges, ROHO  wheelchair cushions or other special devices like boots and shoes to lift the wound off of hard surfaces  Alternating Low Air-loss (ALAL) mattress may be ordered  Padded dressings can reduce wound pressure        Call our [facility] wound clinic for questions/concerns a [phone] .

## 2022-10-26 ENCOUNTER — HOSPITAL ENCOUNTER (OUTPATIENT)
Dept: WOUND CARE | Facility: HOSPITAL | Age: 72
Discharge: HOME | End: 2022-10-26
Attending: EMERGENCY MEDICINE
Payer: MEDICARE

## 2022-10-26 VITALS
DIASTOLIC BLOOD PRESSURE: 77 MMHG | HEIGHT: 60 IN | HEART RATE: 103 BPM | BODY MASS INDEX: 29.45 KG/M2 | TEMPERATURE: 98 F | RESPIRATION RATE: 18 BRPM | SYSTOLIC BLOOD PRESSURE: 124 MMHG | WEIGHT: 150 LBS

## 2022-10-26 DIAGNOSIS — E46 MALNUTRITION, UNSPECIFIED TYPE: ICD-10-CM

## 2022-10-26 DIAGNOSIS — L97.509 TYPE 2 DIABETES MELLITUS WITH FOOT ULCER, WITHOUT LONG-TERM CURRENT USE OF INSULIN: ICD-10-CM

## 2022-10-26 DIAGNOSIS — I73.9 PERIPHERAL VASCULAR DISEASE: ICD-10-CM

## 2022-10-26 DIAGNOSIS — E11.621 TYPE 2 DIABETES MELLITUS WITH FOOT ULCER, WITHOUT LONG-TERM CURRENT USE OF INSULIN: ICD-10-CM

## 2022-10-26 DIAGNOSIS — R29.898 WEAKNESS OF BOTH LOWER EXTREMITIES: ICD-10-CM

## 2022-10-26 DIAGNOSIS — F44.4 FUNCTIONAL PARAPARESIS: ICD-10-CM

## 2022-10-26 DIAGNOSIS — L89.154 PRESSURE ULCER OF SACRAL REGION, STAGE 4: Primary | ICD-10-CM

## 2022-10-26 DIAGNOSIS — L97.519 DIABETIC ULCER OF RIGHT GREAT TOE: ICD-10-CM

## 2022-10-26 DIAGNOSIS — E11.621 DIABETIC ULCER OF RIGHT GREAT TOE: ICD-10-CM

## 2022-10-26 LAB — POCT GLUCOSE: 158 MG/DL (ref 70–110)

## 2022-10-26 PROCEDURE — 27000999 HC MEDICAL RECORD PHOTO DOCUMENTATION

## 2022-10-26 PROCEDURE — 11045 DBRDMT SUBQ TISS EACH ADDL: CPT

## 2022-10-26 PROCEDURE — 11042 DBRDMT SUBQ TIS 1ST 20SQCM/<: CPT

## 2022-10-26 PROCEDURE — 87077 CULTURE AEROBIC IDENTIFY: CPT

## 2022-10-26 NOTE — PATIENT INSTRUCTIONS
"Pt seen today by: Dr Joselin Lake    [facility] health and self care DRESSING INSTRUCTIONS:    Home Health to see pt on Monday and Thursday      Patient and/or family may be asked to assist on other days. Home Health to leave enough supplies for family member to change dressings and demonstrate proper dressing change.      Okay to return the wound vac      Wound location: Sacrum    Cleanse wound with vashe  Apply sensicare to reddened skin around the wound  Pack the wound with vashe moistened 2" bridger  Cover with exudry dressing and secure with cover roll tape (do not tape reddened area - be sure to cover all of red area with exudry then tape)  Change dressing daily or if soiled or not intact      Wound location: Right great toe    Cleanse wound with Vashe  Apply hydroactive gel to wound bed  Then apply promogran over hydroactive gel  Cover with a band-aid (if staying too moist then cover with a small piece of abd pad and wrap with 1" bridger)  Dressing to be changed on Monday and Thursday    Return visit: Nov 2nd, 2022 at 9:00 am      Wound may have been debrided in clinic: if so, WHAT YOU NEED TO KNOW:    Debridement is the removal of infected, damaged, or dead tissue so a wound can heal properly. Your wound may need more than one debridement. Debridement can cause bleeding, and a small amount of blood is expected.  AFTER A DEBRIDEMENT:    Keep your wound clean and dry. Do not remove the dressing unless instructed.  Follow the wound care orders provided to you or your home health care provider.  If you have pain, take over the counter pain relievers or pain medication if prescribed.  Elevate the wound and limit excessive activity to prevent bleeding and/or swelling in your wound.  If you see blood coming through the dressing, apply gauze and tape over the dressing and hold firm pressure to the wound with your hand for 5-10 minutes continuously, without peeking, to help the bleeding stop.  Contact " [facility] wound care team at [phone] or go to the nearest Emergency department if:    You have a fever greater than 101 taken by mouth.  Your pain gets worse or does not go away, even after taking your regular pain medicine.  Your skin around your wound is red, hot, swollen, or draining pus.  You have bleeding that continues to come through the dressing after holding pressure for 10 minutes       Compression: You may be using a compressive type of dressings to control edema: If so, keep your compression wrap or tubigrip in place. Do not get them wet, they should feel snug. If they feel tight, or cause pain in any way,  elevate your legs above your heart for 15 minutes. If the wraps still cause pain or you can not tolerate compression,  please remove and notify the clinic or your home health.     Nutrition:  The current daily value (%DV) for protein is 50 grams per day and is meant as a general goal for most people. Further increasing your dietary protein intake is very important for wound healing. Typically one needs over 100g of protein per day to help with wound healing needs.  If you are a dialysis patient or have problems with your kidneys, talk to your Nephrologist about how much protein you can take in with your condition.  Examples of high protein items that can be added to your diet include: eggs, chicken, red meats, almonds, cottage cheese, Greek yogurt, beans, and peanut butter.  Fortified protein bars, shakes and drinks can add 15-30 additional grams of protein per serving.   Also add:   1 daily general multivitamin   Adalberto : 1 packet twice daily   Vitamin C : 500mg twice daily   Zinc 220 mg daily  Vit D : once daily    Offloading   Offload your wound. This means to reduce pressure on and around the wound that reduces blood flow to the wound and prevents healing. Your wound care team will discuss specific ways for you to offload your specific wound. Common offloading strategies include:    Turn or  reposition every 2 hours or sooner  Use pillows, wedges, ROHO wheelchair cushions or other special devices like boots and shoes to lift the wound off of hard surfaces  Alternating Low Air-loss (ALAL) mattress may be ordered  Padded dressings can reduce wound pressure        Call our [facility] wound clinic for questions/concerns a [phone] .

## 2022-10-28 NOTE — PROCEDURES
"Debridement    Date/Time: 10/26/2022 10:57 AM  Performed by: Marlys Velazquez DO  Authorized by: Marlys Velazquez DO     Time out: Immediately prior to procedure a "time out" was called to verify the correct patient, procedure, equipment, support staff and site/side marked as required.    Consent Done?:  Yes (Verbal)    Preparation: Patient was prepped and draped in usual sterile fashion    Local anesthesia used?: Yes    Local anesthetic:  Topical anesthetic    Wound Details:    Location:  Sacrum    Type of Debridement:  Excisional       Length (cm):  5       Area (sq cm):  22.5       Width (cm):  4.5       Percent Debrided (%):  100       Depth (cm):  3.5       Total Area Debrided (sq cm):  22.5    Depth of debridement:  Subcutaneous tissue    Tissue debrided:  Subcutaneous, Adipose and Fascia    Devitalized tissue debrided:  Slough and Exudate    Instruments:  Curette    2nd Wound Details:     Location:  Right foot    Location:  Right 1st Toe    Location:  Right 1st Toe    Type of Debridement:  Excisional       Length (cm):  1.2       Area (sq cm):  1.2       Width (cm):  1       Percent Debrided (%):  100       Depth (cm):  0.3       Total Area Debrided (sq cm):  1.2    Depth of debridement:  Subcutaneous tissue    Tissue debrided:  Subcutaneous    Devitalized tissue debrided:  Exudate and Slough    Instruments:  Blade    Bleeding:  Minimal  Hemostasis Achieved: Yes    Method Used:  Pressure  Patient tolerance:  Patient tolerated the procedure well with no immediate complications     Curette used to debride wound bed, very tender along right superior region on skin as well as within wound, curette probed and found track going superior at 01:00 direction about 4-5 cm. No drainage, no odor. Periwound pink and inflammed  "

## 2022-10-28 NOTE — PROGRESS NOTES
Subjective:       Patient ID: Niurka Mata is a 72 y.o. female.    Chief Complaint: No chief complaint on file.    HPI        HPI  Pressure Ulcer  72 yr female well known to our clinic for prior wound issue due to parapesis and wheelchair status. Ms. Hughes returned first week of September for a large sacral wound that she has had for atleast 6 months as well as a wound to the dorsal side of her great toe on right foot just below the nail. She restarted smoking for her 72th birthday. A PAB ordered at clinic this month returned at 8.0; thus we have several obstacles impeding her healing status.  No recent fever or chills. On prior few  visits she c/o of buttock pain over her wound bed as well as just touching the skin of her buttock, it was very much out of proportion to what I was finding in her wound bed, thus a  CT with contrast was ordered to r/o abscess track or something deeper in her tissues. The films returned with no evidence of such or osteomyelitis. At her last visit with me the pain had resolved, but as of last week it has returned such that the vac was again held. On today's visit it is still very painful to palpate, I do find an area of deep tracking at around 01:00 in location on wound bed.  She denies fever or chills.   At her visit last week my colleague did write her for a hospital bed with low airloss mattress - this has not come in yet.     ROS:  General: afebrile, no chills, + weakness  ENT: no sore throat but did have some difficulty swallowing, no congestion  Eye: + glasses, no new vision loss  Lungs: No cough or sob  Cv: no chest pain or chf  GI : no nausea or vomiting / no diarrhea at this time    + UTIs, frequency  Musc: weakness in lower legs / chronic back pain with hx compression fx.  Neuro: + neuropathy, no stroke   Skin: + wounds to right great toe, sacrum. Neuralgia over buttock skin  Review of Systems    Objective:    Vital Signs  Temp: 97.8 °F (36.6 °C)  Temp src: Oral  Pulse:  103  Resp: 18  BP: 124/77  Pain Score: 10-Worst pain ever  Pain Loc: Buttocks  Height and Weight  Height: 5' (152.4 cm)  Weight: 68 kg (150 lb)  BSA (Calculated - sq m): 1.7 sq meters  BMI (Calculated): 29.3  Weight in (lb) to have BMI = 25: 127.7]    Physical Exam  Vitals and nursing note reviewed.   Constitutional:       Appearance: She is normal weight.   HENT:      Head: Normocephalic and atraumatic.      Nose: Nose normal.      Mouth/Throat:      Mouth: Mucous membranes are moist.   Eyes:      Extraocular Movements: Extraocular movements intact.      Pupils: Pupils are equal, round, and reactive to light.   Cardiovascular:      Rate and Rhythm: Normal rate and regular rhythm.      Pulses:           Dorsalis pedis pulses are 2+ on the right side.        Posterior tibial pulses are 1+ on the right side.      Heart sounds: Normal heart sounds.   Pulmonary:      Effort: Pulmonary effort is normal.      Breath sounds: Normal breath sounds.   Abdominal:      General: Bowel sounds are normal.   Musculoskeletal:         General: Swelling present.      Cervical back: Normal range of motion.      Right foot: Prominent metatarsal heads present.        Feet:       Comments: M atrophy LE b/l but with passive movement, weakness   Feet:      Right foot:      Skin integrity: Ulcer present.   Skin:     Findings: Lesion (small sliver of wound along dorsum great toe right foot, no bone noted. Sacral region with large pink wound bed with subcu, adipose, fascia layers, tunneling tracking upward at 01:00 direction about 5cm) present.      Comments: Hyperpigmented skin to LE with thick buildup of brown epithelial tissue that is flakey on lower legs. Dry cracked skin to heels b/l   Neurological:      Mental Status: She is alert and oriented to person, place, and time. Mental status is at baseline.   Psychiatric:         Mood and Affect: Mood normal.         Behavior: Behavior normal.       Assessment:       1. Pressure ulcer of sacral  region, stage 4    2. Type 2 diabetes mellitus with foot ulcer, without long-term current use of insulin    3. Diabetic ulcer of right great toe    4. Malnutrition, unspecified type    5. Peripheral vascular disease    6. Functional paraparesis    7. Weakness of both lower extremities           Altered Skin Integrity 10/12/22 1144 Right dorsal Toe, first #3 Diabetic Ulcer (Active)   10/12/22 1144   Altered Skin Integrity Present on Admission: yes   Side: Right   Orientation: dorsal   Location: Toe, first   Wound Number: #3   Is this injury device related?: No   Primary Wound Type: Diabetic ulc   Description of Altered Skin Integrity:    Ankle-Brachial Index: 9/28/22 R dp 100 pt 104   Pulses: non-palpable x 4 - + Doppler monophasic x 4   Removal Indication and Assessment:    Wound Outcome:    (Retired) Wound Length (cm):    (Retired) Wound Width (cm):    (Retired) Depth (cm):    Wound Description (Comments):    Removal Indications:    Wound Image   10/26/22 1210   Description of Altered Skin Integrity Partial thickness tissue loss. Shallow open ulcer with a red or pink wound bed, without slough. Intact or Open/Ruptured Serum-filled blister. 10/26/22 1210   Dressing Appearance Intact 10/26/22 1210   Drainage Amount Small 10/26/22 1210   Drainage Characteristics/Odor Yellow;Serosanguineous 10/26/22 1210   Appearance Pink;Yellow 10/26/22 1210   Tissue loss description Partial thickness 10/26/22 1210   Black (%), Wound Tissue Color 0 % 10/26/22 1210   Red (%), Wound Tissue Color 10 % 10/26/22 1210   Yellow (%), Wound Tissue Color 10 % 10/26/22 1210   Periwound Area Intact 10/26/22 1210   Wound Edges Defined 10/26/22 1210   Wound Length (cm) 1.2 cm 10/26/22 1210   Wound Width (cm) 1 cm 10/26/22 1210   Wound Depth (cm) 0.3 cm 10/26/22 1210   Wound Volume (cm^3) 0.36 cm^3 10/26/22 1210   Wound Surface Area (cm^2) 1.2 cm^2 10/26/22 1210   Care Cleansed with:;Wound cleanser;Applied: 10/26/22 1210   Dressing  Removed;Changed;Collagen;Silver;Rolled gauze 10/26/22 1210            (Retired) Wound 10/12/22 1144 Other (comment) medial Sacral Spine #1 (Active)   10/12/22 1144    Pre-existing: Yes   Primary Wound Type: Other   Side:    Orientation: medial   Location: Sacral Spine   Wound Number: #1   Ankle-Brachial Index:    Pulses:    Removal Indication and Assessment:    Wound Outcome:    (Retired) Wound Type:    (Retired) Wound Length (cm):    (Retired) Wound Width (cm):    (Retired) Depth (cm):    Wound Description (Comments):    Removal Indications:    Wound Image   10/26/22 1211   Dressing Appearance Intact;Moist drainage 10/26/22 1211   Drainage Amount Moderate 10/26/22 1211   Drainage Characteristics/Odor Yellow;Serosanguineous 10/26/22 1211   Appearance Pink;Yellow 10/26/22 1211   Tissue loss description Full thickness 10/26/22 1211   Black (%), Wound Tissue Color 0 % 10/26/22 1211   Red (%), Wound Tissue Color 75 % 10/26/22 1211   Yellow (%), Wound Tissue Color 25 % 10/26/22 1211   Periwound Area Intact 10/26/22 1211   Wound Edges Defined 10/26/22 1211   Wound Length (cm) 5 cm 10/26/22 1211   Wound Width (cm) 4.5 cm 10/26/22 1211   Wound Depth (cm) 3.5 cm 10/26/22 1211   Wound Volume (cm^3) 78.75 cm^3 10/26/22 1211   Wound Surface Area (cm^2) 22.5 cm^2 10/26/22 1211   Undermining (depth (cm)/location) 5.0 at 1:00 10/26/22 1211   Care Cleansed with:;Wound cleanser;Applied: 10/26/22 1211   Dressing Removed;Changed;Absorptive Pad 10/26/22 1211           Plan:       Problem list:  Sacral pressure ulcer, stage IV: 2020, returned early 2022, return to clinic 8/31/22  Significant periwound dermatitis rash noted on 10/19/2022  Right dorsal great toe chronic flexion with dorsal DFU: exposed bone/joint capsule noted 9/7/22, cx negative: some improvement  Prior left heel diabetic/pressure ulcer 2020  Severe BLE PAD:  previous stents  2019 with CIS/[place]; [place]  early May 2020:  high grade stenosis bilateral common fem  arteries.  CIS 11/4/20: Balloon angioplasty Laser atherectomy of Left SFA pop segment and Balloon angioplasty Left KRISTEN-Dr Fleming  Diabetes with neuropathy, hemoglobin A1c  May 2020: > 14, Down to 7.0 August 2020: unknown recent  bilateral lower extremity lymphedema with chronic stasis changes.  Malnutrition, prealbumin  17.0 September 2020  Deconditioning, and failure to thrive  Hypertension  Dyslipidemia  CAD  Heavy/chronic smoker      Treatment plan:  I debrided her wounds today - she still has a high degree of pain in sacral region. I did find a very deep area of tunneling which is quite sensitive. I am going to have her pack this wound and discontinue NPWT for now. I may have to consider other imaging if pain continues with this area noted today as I think this is the origin of her pain.   I will have nursing check on bed status  I again encouraged her to continue with high protein diet - 80-100grams/day.  Offloading is paramount but in her current bed this is not happening as she is alone in home for prolonged periods of time. I did ask about a colostomy consideration at last visit but she is not interested. Her daughter is much more involved so I will work with her as well.  She does take some vitamins but when asked about zinc, vitamin C and Multivitamin she is non-committal. Is currently on vitamin D.  I reminded her again of negative affects nicotine has on wounds  I will have her return in one week for followup.

## 2022-10-30 LAB
BACTERIA SPEC CULT: ABNORMAL

## 2022-11-01 ENCOUNTER — DOCUMENT SCAN (OUTPATIENT)
Dept: HOME HEALTH SERVICES | Facility: HOSPITAL | Age: 72
End: 2022-11-01
Payer: MEDICARE

## 2022-11-09 ENCOUNTER — HOSPITAL ENCOUNTER (OUTPATIENT)
Dept: WOUND CARE | Facility: HOSPITAL | Age: 72
Discharge: HOME | End: 2022-11-09
Attending: EMERGENCY MEDICINE
Payer: MEDICARE

## 2022-11-09 VITALS
HEIGHT: 60 IN | BODY MASS INDEX: 28.47 KG/M2 | RESPIRATION RATE: 18 BRPM | DIASTOLIC BLOOD PRESSURE: 82 MMHG | TEMPERATURE: 98 F | SYSTOLIC BLOOD PRESSURE: 125 MMHG | WEIGHT: 145 LBS | HEART RATE: 106 BPM

## 2022-11-09 DIAGNOSIS — E11.621 TYPE 2 DIABETES MELLITUS WITH FOOT ULCER, WITHOUT LONG-TERM CURRENT USE OF INSULIN: ICD-10-CM

## 2022-11-09 DIAGNOSIS — R62.7 FAILURE TO THRIVE IN ADULT: ICD-10-CM

## 2022-11-09 DIAGNOSIS — I73.9 PERIPHERAL VASCULAR DISEASE: ICD-10-CM

## 2022-11-09 DIAGNOSIS — L97.509 TYPE 2 DIABETES MELLITUS WITH FOOT ULCER, WITHOUT LONG-TERM CURRENT USE OF INSULIN: ICD-10-CM

## 2022-11-09 DIAGNOSIS — L97.519 DIABETIC ULCER OF RIGHT GREAT TOE: ICD-10-CM

## 2022-11-09 DIAGNOSIS — E46 MALNUTRITION, UNSPECIFIED TYPE: ICD-10-CM

## 2022-11-09 DIAGNOSIS — L89.154 PRESSURE ULCER OF SACRAL REGION, STAGE 4: Primary | ICD-10-CM

## 2022-11-09 DIAGNOSIS — F44.4 FUNCTIONAL PARAPARESIS: ICD-10-CM

## 2022-11-09 DIAGNOSIS — E11.621 DIABETIC ULCER OF RIGHT GREAT TOE: ICD-10-CM

## 2022-11-09 LAB — POCT GLUCOSE: 218 MG/DL (ref 70–110)

## 2022-11-09 PROCEDURE — 11042 DBRDMT SUBQ TIS 1ST 20SQCM/<: CPT

## 2022-11-09 PROCEDURE — 11043 DBRDMT MUSC&/FSCA 1ST 20/<: CPT

## 2022-11-09 PROCEDURE — 27000999 HC MEDICAL RECORD PHOTO DOCUMENTATION

## 2022-11-09 NOTE — PATIENT INSTRUCTIONS
"Pt seen today by: Dr Joselin Lake    [facility] health and self care DRESSING INSTRUCTIONS:    Home Health to see pt on Monday and Thursday      Patient and/or family may be asked to assist on other days. Home Health to leave enough supplies for family member to change dressings and demonstrate proper dressing change.      Okay to return the wound vac      Wound location: Sacrum    Cleanse wound with vashe  Apply sensicare to reddened skin around the wound  Pack the wound with vashe moistened 2" bridger  Cover with exudry dressing and secure with cover roll tape (do not tape reddened area - be sure to cover all of red area with exudry then tape)  Change dressing daily or if soiled or not intact      Wound location: Right great toe    Cleanse wound with Vashe  Apply hydroactive gel to wound bed  Then apply promogran over hydroactive gel  Cover with a band-aid (if staying too moist then cover with a small piece of abd pad and wrap with 1" bridger)  Dressing to be changed on Monday and Thursday    Return visit: Nov 16th, 2022 at 1:00      Wound may have been debrided in clinic: if so, WHAT YOU NEED TO KNOW:    Debridement is the removal of infected, damaged, or dead tissue so a wound can heal properly. Your wound may need more than one debridement. Debridement can cause bleeding, and a small amount of blood is expected.  AFTER A DEBRIDEMENT:    Keep your wound clean and dry. Do not remove the dressing unless instructed.  Follow the wound care orders provided to you or your home health care provider.  If you have pain, take over the counter pain relievers or pain medication if prescribed.  Elevate the wound and limit excessive activity to prevent bleeding and/or swelling in your wound.  If you see blood coming through the dressing, apply gauze and tape over the dressing and hold firm pressure to the wound with your hand for 5-10 minutes continuously, without peeking, to help the bleeding stop.  Contact " [facility] wound care team at [phone] or go to the nearest Emergency department if:    You have a fever greater than 101 taken by mouth.  Your pain gets worse or does not go away, even after taking your regular pain medicine.  Your skin around your wound is red, hot, swollen, or draining pus.  You have bleeding that continues to come through the dressing after holding pressure for 10 minutes       Compression: You may be using a compressive type of dressings to control edema: If so, keep your compression wrap or tubigrip in place. Do not get them wet, they should feel snug. If they feel tight, or cause pain in any way,  elevate your legs above your heart for 15 minutes. If the wraps still cause pain or you can not tolerate compression,  please remove and notify the clinic or your home health.     Nutrition:  The current daily value (%DV) for protein is 50 grams per day and is meant as a general goal for most people. Further increasing your dietary protein intake is very important for wound healing. Typically one needs over 100g of protein per day to help with wound healing needs.  If you are a dialysis patient or have problems with your kidneys, talk to your Nephrologist about how much protein you can take in with your condition.  Examples of high protein items that can be added to your diet include: eggs, chicken, red meats, almonds, cottage cheese, Greek yogurt, beans, and peanut butter.  Fortified protein bars, shakes and drinks can add 15-30 additional grams of protein per serving.   Also add:   1 daily general multivitamin   Adalberto : 1 packet twice daily   Vitamin C : 500mg twice daily   Zinc 220 mg daily  Vit D : once daily    Offloading   Offload your wound. This means to reduce pressure on and around the wound that reduces blood flow to the wound and prevents healing. Your wound care team will discuss specific ways for you to offload your specific wound. Common offloading strategies include:    Turn or  reposition every 2 hours or sooner  Use pillows, wedges, ROHO wheelchair cushions or other special devices like boots and shoes to lift the wound off of hard surfaces  Alternating Low Air-loss (ALAL) mattress may be ordered  Padded dressings can reduce wound pressure        Call our [facility] wound clinic for questions/concerns a [phone] .

## 2022-11-13 PROBLEM — F44.4 FUNCTIONAL PARAPARESIS: Status: ACTIVE | Noted: 2022-11-13

## 2022-11-13 NOTE — PROGRESS NOTES
Subjective:       Patient ID: Niurka Mata is a 72 y.o. female.    Chief Complaint: Pressure Ulcer (sacral)    HPI  Pressure Ulcer  72 yr female well known to our clinic for prior wound issue due to parapesis and wheelchair status. Ms. Hughes returned first week of September for a large sacral wound that she has had for atleast 6 months as well as a wound to the dorsal side of her great toe on right foot just below the nail. She restarted smoking for her 72th birthday. She is very  immobile and is bed confined due to weakness and pain.  No recent fever or chills. On prior few  visits she c/o of buttock pain over her wound bed as well as just touching the skin of her buttock, it was very much out of proportion to what I was finding in her wound bed, thus a  CT with contrast was ordered to r/o abscess track or something deeper in her tissues. The films returned with no evidence of such or osteomyelitis. She already has a pain management doctor and is on high doses of pain meds. However, I think this pain is real. We had a one week interval of pain free symptons but it has again returned. I am concerned b/c her sacral wound has a large area of tunneling that goes up about 5cm deep along side of her midline and to right side of what would be her sacral region.  I do not feel bone but probing hurts her.  We stopped using the vac bc it seemed to make her pain worse. The outside of her wound is healing up faster than inside.  She denies fever or chills.   She is still waiting for her  hospital bed with low airloss mattress to arrive.      ROS:  General: afebrile, no chills, + weakness  ENT: no sore throat but did have some difficulty swallowing, no congestion  Eye: + glasses, no new vision loss  Lungs: No cough or sob  Cv: no chest pain or chf  GI : no nausea or vomiting / no diarrhea at this time    + UTIs, frequency  Musc: weakness in lower legs / chronic back pain with hx compression fx.  Neuro: + neuropathy, no  stroke   Skin: + wounds to right great toe, sacrum. Neuralgia over buttock skin    Objective:      Vital Signs  Temp: 98.2 °F (36.8 °C)  Temp src: Oral  Pulse: 106  Resp: 18  BP: 125/82  Pain Score: 10-Worst pain ever  Pain Loc: Buttocks  Height and Weight  Height: 5' (152.4 cm)  Weight: 65.8 kg (145 lb)  BSA (Calculated - sq m): 1.67 sq meters  BMI (Calculated): 28.3  Weight in (lb) to have BMI = 25: 127.7]    Physical Exam  Vitals and nursing note reviewed.   Constitutional:       Appearance: She is normal weight.   HENT:      Head: Normocephalic and atraumatic.      Nose: Nose normal.      Mouth/Throat:      Mouth: Mucous membranes are moist.   Eyes:      Extraocular Movements: Extraocular movements intact.      Pupils: Pupils are equal, round, and reactive to light.   Cardiovascular:      Rate and Rhythm: Normal rate and regular rhythm.      Pulses:           Dorsalis pedis pulses are 2+ on the right side.        Posterior tibial pulses are 1+ on the right side.      Heart sounds: Normal heart sounds.   Pulmonary:      Effort: Pulmonary effort is normal.      Breath sounds: Normal breath sounds.   Abdominal:      General: Bowel sounds are normal.   Musculoskeletal:         General: Swelling present.      Cervical back: Normal range of motion.      Right foot: Prominent metatarsal heads present.        FeeComments: M atrophy LE b/l but with passive movement, weakness   Feet:      Right foot:      Skin integrity: Ulcer present.   Skin:     Findings: Lesion (small sliver of wound along dorsum great toe right foot, no bone noted. Sacral region with  pink wound bed with subcu, adipose, fascia layers, tunneling tracking upward at 01:00 direction about 5cm) present.      Comments: Hyperpigmented skin to LE with thick buildup of brown epithelial tissue that is flakey on lower legs. Dry cracked skin to heels b/l   Neurological:      Mental Status: She is alert and oriented to person, place, and time. Mental status is at  baseline.   Psychiatric:         Mood and Affect: Mood normal.         Behavior: Behavior normal.        Assessment:       1. Pressure ulcer of sacral region, stage 4    2. Type 2 diabetes mellitus with foot ulcer, without long-term current use of insulin    3. Diabetic ulcer of right great toe    4. Peripheral vascular disease    5. Failure to thrive in adult    6. Functional paraparesis    7. Malnutrition, unspecified type           Altered Skin Integrity 10/12/22 1144 Right dorsal Toe, first #3 Diabetic Ulcer (Active)   10/12/22 1144   Altered Skin Integrity Present on Admission: yes   Side: Right   Orientation: dorsal   Location: Toe, first   Wound Number: #3   Is this injury device related?: No   Primary Wound Type: Diabetic ulc   Description of Altered Skin Integrity:    Ankle-Brachial Index: 9/28/22 R dp 100 pt 104   Pulses: non-palpable x 4 - + Doppler monophasic x 4   Removal Indication and Assessment:    Wound Outcome:    (Retired) Wound Length (cm):    (Retired) Wound Width (cm):    (Retired) Depth (cm):    Wound Description (Comments):    Removal Indications:    Wound Image   11/09/22 1200   Description of Altered Skin Integrity Full thickness tissue loss. Subcutaneous fat may be visible but bone, tendon or muscle are not exposed 11/09/22 1200   Dressing Appearance Intact 11/09/22 1200   Drainage Amount Small 11/09/22 1200   Drainage Characteristics/Odor Yellow 11/09/22 1200   Appearance Pink 11/09/22 1200   Black (%), Wound Tissue Color 0 % 11/09/22 1200   Red (%), Wound Tissue Color 100 % 11/09/22 1200   Yellow (%), Wound Tissue Color 0 % 11/09/22 1200   Periwound Area Intact 11/09/22 1200   Wound Edges Defined 11/09/22 1200   Wound Length (cm) 0.2 cm 11/09/22 1200   Wound Width (cm) 0.8 cm 11/09/22 1200   Wound Depth (cm) 0.6 cm 11/09/22 1200   Wound Volume (cm^3) 0.096 cm^3 11/09/22 1200   Wound Surface Area (cm^2) 0.16 cm^2 11/09/22 1200   Care Cleansed with:;Soap and water;Wound cleanser;Applied:  11/09/22 1200   Dressing Removed;Changed;Collagen;Silver;Bandaid 11/09/22 1200            (Retired) Wound 10/12/22 1144 Other (comment) medial Sacral Spine #1 (Active)   10/12/22 1144    Pre-existing: Yes   Primary Wound Type: Other   Side:    Orientation: medial   Location: Sacral Spine   Wound Number: #1   Ankle-Brachial Index:    Pulses:    Removal Indication and Assessment:    Wound Outcome:    (Retired) Wound Type:    (Retired) Wound Length (cm):    (Retired) Wound Width (cm):    (Retired) Depth (cm):    Wound Description (Comments):    Removal Indications:    Wound Image   11/09/22 1201   Dressing Appearance Intact;Moist drainage 11/09/22 1201   Drainage Amount Moderate 11/09/22 1201   Drainage Characteristics/Odor Serosanguineous;Yellow 11/09/22 1201   Appearance [place] 11/09/22 1201   Tissue loss description Full thickness 11/09/22 1201   Black (%), Wound Tissue Color 0 % 11/09/22 1201   Red (%), Wound Tissue Color 100 % 11/09/22 1201   Yellow (%), Wound Tissue Color 0 % 11/09/22 1201   Periwound Area Macerated 11/09/22 1201   Wound Edges Defined 11/09/22 1201   Wound Length (cm) 2.5 cm 11/09/22 1201   Wound Width (cm) 1.4 cm 11/09/22 1201   Wound Depth (cm) 1.4 cm 11/09/22 1201   Wound Volume (cm^3) 4.9 cm^3 11/09/22 1201   Wound Surface Area (cm^2) 3.5 cm^2 11/09/22 1201   Undermining (depth (cm)/location) 4.5 between 12-3:00 11/09/22 1201   Care Cleansed with:;Wound cleanser;Applied: 11/09/22 1201   Dressing Removed;Changed;Foam 11/09/22 1201           Plan:        Latest Reference Range & Units Most Recent 11/09/22 11:44   POCT Glucose 70 - 110 mg/dL 218 (H)  11/9/22 11:44 218 (H)   (H): Data is abnormally high  Problem list:    Sacral pressure ulcer, stage IV: 2020, returned early 2022, return to clinic 8/31/22  Significant periwound dermatitis rash noted on 10/19/2022  Right dorsal great toe chronic flexion with dorsal DFU: exposed bone/joint capsule noted 9/7/22, cx negative: some improvement  Prior  left heel diabetic/pressure ulcer 2020  Severe BLE PAD:  previous stents  2019 with CIS/[facility] US early May 2020:  high grade stenosis bilateral common fem arteries.  CIS 11/4/20: Balloon angioplasty Laser atherectomy of Left SFA pop segment and Balloon angioplasty Left KRISTEN-Dr Fleming  Diabetes with neuropathy, hemoglobin A1c  May 2020: > 14, Down to 7.0 August 2020: unknown recent  bilateral lower extremity lymphedema with chronic stasis changes.  Malnutrition, prealbumin  17.0 September 2020, 16.0 December 8,2021.   Deconditioning, and failure to thrive  Hypertension  Dyslipidemia  CAD  Heavy/chronic smoke    Plan:  Ms Hughes is currently waiting for an alternating mattress /hospital bed - this should help with her sacral wound and offloading. Hopefully also with pain. I am going to order an ultrasound and see if we could sonographically see any fluid pocket as well.  I debrided her wounds today. I am able to probe 5cm superiorly just right of midline (sacral bone) but do not probe bone at end of curette. It is very painful. No drainage noted. I am going to continue to try and pack this wound as she is closing up faster on outside than inside.   I debrided her toe wound which is just about healed. We discussed home care.  Her daughter and I again spoke about her nutritional needs really needing to be addressed - must up her protein inake. She says she has very little appetite.  I again reviewed vitamin supplements pertinant to her diet and wound healing.   I will see her back in one week and re-evaluate her wounds and pain level.

## 2022-11-13 NOTE — PROCEDURES
"Debridement    Date/Time: 11/9/2022 11:00 AM  Performed by: Marlys Velazquez DO  Authorized by: Marlys Velazquez DO     Time out: Immediately prior to procedure a "time out" was called to verify the correct patient, procedure, equipment, support staff and site/side marked as required.    Consent Done?:  Yes (Verbal)    Preparation: Patient was prepped and draped in usual sterile fashion    Local anesthesia used?: Yes    Local anesthetic:  Topical anesthetic    Wound Details:    Location:  Right foot    Location:  Right 1st Toe    Type of Debridement:  Excisional       Length (cm):  0.2       Area (sq cm):  0.16       Width (cm):  0.8       Percent Debrided (%):  100       Depth (cm):  0.4       Total Area Debrided (sq cm):  0.16    Depth of debridement:  Subcutaneous tissue    Tissue debrided:  Subcutaneous    Devitalized tissue debrided:  Slough and Exudate    Instruments:  Blade    2nd Wound Details:     Debridement - 2nd Wound - General Location: sacrum.    Type of Debridement:  Excisional       Length (cm):  2.5       Area (sq cm):  3.75       Width (cm):  1.5       Percent Debrided (%):  100       Depth (cm):  1.4       Total Area Debrided (sq cm):  3.75    Depth of debridement:  Muscle/fascia/tendon    Tissue debrided:  Fascia and Subcutaneous    Devitalized tissue debrided:  Biofilm, Slough and Exudate    Instruments:  Curette    Bleeding:  Minimal  Hemostasis Achieved: Yes    Method Used:  Pressure  Patient tolerance:  Patient tolerated the procedure well with no immediate complications     Sacral ulcer with 5cm depth tunneling superiorly around 01:00 direction of wound bed just to the right of midine. No bone probed but tissue and slightly different planes of angulation to find track required. Slough and little exudate returned  "

## 2022-11-23 ENCOUNTER — HOSPITAL ENCOUNTER (OUTPATIENT)
Dept: WOUND CARE | Facility: HOSPITAL | Age: 72
Discharge: HOME | End: 2022-11-23
Attending: INTERNAL MEDICINE
Payer: MEDICARE

## 2022-11-23 VITALS
SYSTOLIC BLOOD PRESSURE: 140 MMHG | WEIGHT: 145 LBS | HEIGHT: 60 IN | DIASTOLIC BLOOD PRESSURE: 84 MMHG | RESPIRATION RATE: 16 BRPM | TEMPERATURE: 98 F | HEART RATE: 120 BPM | BODY MASS INDEX: 28.47 KG/M2

## 2022-11-23 DIAGNOSIS — R29.898 WEAKNESS OF BOTH LOWER EXTREMITIES: ICD-10-CM

## 2022-11-23 DIAGNOSIS — F44.4 FUNCTIONAL PARAPARESIS: ICD-10-CM

## 2022-11-23 DIAGNOSIS — G89.29 OTHER CHRONIC PAIN: ICD-10-CM

## 2022-11-23 DIAGNOSIS — E11.621 DIABETIC ULCER OF RIGHT GREAT TOE: ICD-10-CM

## 2022-11-23 DIAGNOSIS — L89.154 PRESSURE ULCER OF SACRAL REGION, STAGE 4: ICD-10-CM

## 2022-11-23 DIAGNOSIS — E46 MALNUTRITION, UNSPECIFIED TYPE: ICD-10-CM

## 2022-11-23 DIAGNOSIS — E11.42 DIABETIC POLYNEUROPATHY ASSOCIATED WITH TYPE 2 DIABETES MELLITUS: ICD-10-CM

## 2022-11-23 DIAGNOSIS — L97.509 TYPE 2 DIABETES MELLITUS WITH FOOT ULCER, WITHOUT LONG-TERM CURRENT USE OF INSULIN: ICD-10-CM

## 2022-11-23 DIAGNOSIS — L97.519 DIABETIC ULCER OF RIGHT GREAT TOE: ICD-10-CM

## 2022-11-23 DIAGNOSIS — R62.7 FAILURE TO THRIVE IN ADULT: ICD-10-CM

## 2022-11-23 DIAGNOSIS — I73.9 PERIPHERAL VASCULAR DISEASE: ICD-10-CM

## 2022-11-23 DIAGNOSIS — R63.6 UNDERWEIGHT: ICD-10-CM

## 2022-11-23 DIAGNOSIS — E11.621 TYPE 2 DIABETES MELLITUS WITH FOOT ULCER, WITHOUT LONG-TERM CURRENT USE OF INSULIN: ICD-10-CM

## 2022-11-23 LAB — POCT GLUCOSE: 318 MG/DL (ref 70–110)

## 2022-11-23 PROCEDURE — 17250 PR CHEM CAUTERY GRANULATN TISSUE: ICD-10-PCS | Mod: ,,, | Performed by: EMERGENCY MEDICINE

## 2022-11-23 PROCEDURE — 99213 PR OFFICE/OUTPT VISIT, EST, LEVL III, 20-29 MIN: ICD-10-PCS | Mod: 25,,, | Performed by: EMERGENCY MEDICINE

## 2022-11-23 PROCEDURE — 99213 OFFICE O/P EST LOW 20 MIN: CPT | Mod: 25,,, | Performed by: EMERGENCY MEDICINE

## 2022-11-23 PROCEDURE — 27000999 HC MEDICAL RECORD PHOTO DOCUMENTATION

## 2022-11-23 PROCEDURE — 17250 CHEM CAUT OF GRANLTJ TISSUE: CPT | Mod: ,,, | Performed by: EMERGENCY MEDICINE

## 2022-11-23 PROCEDURE — 99213 OFFICE O/P EST LOW 20 MIN: CPT

## 2022-11-23 NOTE — PROGRESS NOTES
Subjective:       Patient ID: Niurka Mata is a 72 y.o. female.    Chief Complaint: No chief complaint on file.    72-year-old WF referred to this Wound Care Clinic by [facility]/Dr. Haris Newman to help treat chronic pressure ulcers.  Patient has a history of diabetes, coronary artery disease, generalized debility, dyslipidemia,  hypertension, PAD with stents, neuropathy, anemia, vitamin-D deficiency, malnutrition , chronic smoking as well as  chronic sacral ulcer and chronic right toe ulcer  Pt has a h/o of sacral pressure ulcer and a right heel ulcer  back in 2020 and was sent to this wound care clinic for treatment  and required LTACH admission and advanced wound measures and both actually healed and she was discharged in Feb 2021.  In 2022, she had an MI, gallbladder and esophageal issues. She missed too many appointments with Dr Leal so she was fired from his practice.  She re-developed a severe stage IV sacral pressure ulcer as well as a right dorsal toe DFU.  She returned to this clinic on 8/31/22 and  has been managed by Dr Velazquez since then although I did see her in mid Sept and mid October when covering for Dr Velazquez as I am today.  Pt has been battling sacral ulcer pain out of proportion to clinical exam. CT pelvis done on 10/6/22 which was negative. She was intolerant of use of NPWT but overall pain is better after having meds adjusted with pain management. Here with daughter who tells me sacral ulcer better and toe ulcer closing up.      Review of Systems   Constitutional:  Positive for fatigue. Negative for chills and fever. Activity change: chronically sedentary.  HENT: Negative.     Respiratory: Negative.     Cardiovascular:  Negative for chest pain and palpitations. Leg swelling: chronic BLE edema.  Gastrointestinal: Negative.    Genitourinary: Negative.    Musculoskeletal:  Positive for arthralgias and myalgias.   Skin:  Negative for color change, pallor and rash. Wound:  see hpi.  Neurological:  Negative for dizziness, seizures, syncope and headaches. Weakness: diffuse.      Objective:      Vitals:    11/23/22 1119   BP: (!) 140/84   Pulse: (!) 120   Resp: 16   Temp: 98.2 °F (36.8 °C)     @poctglucose@  No results for input(s): POCTGLUCOSE in the last 24 hours.    Physical Exam  Constitutional:       Appearance: She is underweight. She is ill-appearing. She is not diaphoretic.      Comments: Looks much older than stated age   HENT:      Head: Normocephalic and atraumatic.      Mouth/Throat:      Pharynx: Oropharynx is clear.   Eyes:      Conjunctiva/sclera: Conjunctivae normal.   Cardiovascular:      Pulses: Normal pulses.   Pulmonary:      Effort: Pulmonary effort is normal.   Abdominal:      General: Abdomen is flat.   Musculoskeletal:      Right lower leg: Edema present.      Left lower leg: Edema present.        Legs:         Feet:    Skin:     General: Skin is warm.      Capillary Refill: Capillary refill takes less than 2 seconds.      Coloration: Skin is not jaundiced or pale.      Findings: No bruising or erythema.   Neurological:      General: No focal deficit present.      Mental Status: She is alert and oriented to person, place, and time. Mental status is at baseline.      Motor: Weakness (diffuse) present.   Psychiatric:         Mood and Affect: Mood normal.         Behavior: Behavior is cooperative.            Altered Skin Integrity 10/12/22 1144 Right dorsal Toe, first #3 Diabetic Ulcer (Active)   10/12/22 1144   Altered Skin Integrity Present on Admission: yes   Side: Right   Orientation: dorsal   Location: Toe, first   Wound Number: #3   Is this injury device related?: No   Primary Wound Type: Diabetic [facility]   Description of Altered Skin Integrity:    Ankle-Brachial Index: 9/28/22 R dp 100 pt 104   Pulses: palpable x 4   Removal Indication and Assessment:    Wound Outcome:    (Retired) Wound Length (cm):    (Retired) Wound Width (cm):    (Retired) Depth (cm):    Wound  Description (Comments):    Removal Indications:    Wound Image   11/23/22 1135   Dressing Appearance Dry;Intact 11/23/22 1135   Drainage Amount None 11/23/22 1135   Appearance Pink;Epithelialization 11/23/22 1135   Black (%), Wound Tissue Color 0 % 11/23/22 1135   Red (%), Wound Tissue Color 100 % 11/23/22 1135   Yellow (%), Wound Tissue Color 0 % 11/23/22 1135   Periwound Area Intact 11/23/22 1135   Wound Edges Defined 11/23/22 1135   Wound Length (cm) 0.5 cm 11/23/22 1135   Wound Width (cm) 0.1 cm 11/23/22 1135   Wound Surface Area (cm^2) 0.05 cm^2 11/23/22 1135   Care Cleansed with:;Wound cleanser;Applied: 11/23/22 1135   Dressing Applied;Bandaid 11/23/22 1135            (Retired) Wound 10/12/22 1144 Other (comment) medial Sacral Spine #1 (Active)   10/12/22 1144    Pre-existing: Yes   Primary Wound Type: Other   Side:    Orientation: medial   Location: Sacral Spine   Wound Number: #1   Ankle-Brachial Index:    Pulses:    Removal Indication and Assessment:    Wound Outcome:    (Retired) Wound Type:    (Retired) Wound Length (cm):    (Retired) Wound Width (cm):    (Retired) Depth (cm):    Wound Description (Comments):    Removal Indications:    Wound Image   11/23/22 1139   Dressing Appearance Intact;Moist drainage 11/23/22 1139   Drainage Amount Moderate 11/23/22 1139   Drainage Characteristics/Odor Serosanguineous 11/23/22 1139   Appearance [place] 11/23/22 1139   Black (%), Wound Tissue Color 0 % 11/23/22 1139   Red (%), Wound Tissue Color 100 % 11/23/22 1139   Yellow (%), Wound Tissue Color 0 % 11/23/22 1139   Periwound Area Intact;[place];Swelling 11/23/22 1139   Wound Edges Defined 11/23/22 1139   Wound Length (cm) 2.3 cm 11/23/22 1139   Wound Width (cm) 1 cm 11/23/22 1139   Wound Depth (cm) 1.1 cm 11/23/22 1139   Wound Volume (cm^3) 2.53 cm^3 11/23/22 1139   Wound Surface Area (cm^2) 2.3 cm^2 11/23/22 1139   Tunneling (depth (cm)/location) from 12-3 with deepest @ 1 measuring 4.5cm 11/23/22 1139   Care Cleansed  with:;Wound cleanser;Applied: 11/23/22 1139   Dressing Applied;Calcium alginate;Silver;Gauze;Foam 11/23/22 1139   Periwound Care Moisture barrier applied 11/23/22 1139           Assessment:       1. Pressure ulcer of sacral region, stage 4    2. Type 2 diabetes mellitus with foot ulcer, without long-term current use of insulin    3. Functional paraparesis    4. Failure to thrive in adult    5. Diabetic ulcer of right great toe    6. Peripheral vascular disease    7. Malnutrition, unspecified type    8. Weakness of both lower extremities    9. Diabetic polyneuropathy associated with type 2 diabetes mellitus    10. Underweight    11. Other chronic pain            Sacral pressure ulcer, stage IV: 2020, returned early 2022, return to clinic 8/31/22  Significant periwound dermatitis rash noted on 10/19/2022  Right dorsal great toe chronic flexion with dorsal DFU: exposed bone/joint capsule noted 9/7/22, cx negative: improving  Prior left heel diabetic/pressure ulcer 2020  Severe BLE PAD:  previous stents  2019 with CIS/[facility]; [place]  early May 2020:  high grade stenosis bilateral common fem arteries.  CIS 11/4/20: Balloon angioplasty Laser atherectomy of Left SFA pop segment and Balloon angioplasty Left KRISTEN-Dr Fleming  Diabetes with neuropathy, hemoglobin A1c  May 2020: > 14, Down to 7.0 August 2020: unknown recent  bilateral lower extremity lymphedema with chronic stasis changes.  Malnutrition, prealbumin  17.0 September 2020  Deconditioning and failure to thrive  Hypertension  Dyslipidemia  CAD  Heavy/chronic smoker  Lab Results   Component Value Date    WBC 12.8 (H) 10/05/2022    HGB 12.8 10/05/2022    HCT 39.0 10/05/2022    MCV 84.4 10/05/2022     (H) 10/05/2022         CMP  Sodium Level   Date Value Ref Range Status   10/05/2022 133 (L) 136 - 145 mmol/L Final     Potassium Level   Date Value Ref Range Status   10/05/2022 3.9 3.5 - 5.1 mmol/L Final     Carbon Dioxide   Date Value Ref Range Status    10/05/2022 26 23 - 31 mmol/L Final     Blood Urea Nitrogen   Date Value Ref Range Status   10/05/2022 9.0 (L) 9.8 - 20.1 mg/dL Final     Creatinine   Date Value Ref Range Status   10/05/2022 0.64 0.55 - 1.02 mg/dL Final     Calcium Level Total   Date Value Ref Range Status   10/05/2022 8.9 8.4 - 10.2 mg/dL Final     Albumin Level   Date Value Ref Range Status   10/05/2022 2.4 (L) 3.4 - 4.8 gm/dL Final     Bilirubin Total   Date Value Ref Range Status   10/05/2022 0.3 <=1.5 mg/dL Final     Alkaline Phosphatase   Date Value Ref Range Status   10/05/2022 90 40 - 150 unit/L Final     Aspartate Aminotransferase   Date Value Ref Range Status   10/05/2022 8 5 - 34 unit/L Final     Alanine Aminotransferase   Date Value Ref Range Status   10/05/2022 7 0 - 55 unit/L Final     Estimated GFR-Non    Date Value Ref Range Status   06/20/2022 >60 mls/min/1.73/m2 Final     Lab Results   Component Value Date    HGBA1C 6.3 06/15/2022     Results for orders placed or performed during the hospital encounter of 10/06/22 (from the past 2160 hour(s))   CT Pelvis With Contrast    Impression    Sacral decubitus seen with minimal fluid collection is seen within the decubitus ulcer as outlined above.  Collection seems to communicate with the open wound.      Electronically signed by: Libby Rowe  Date:    10/06/2022  Time:    12:05       EXAMINATION:  XR TOE 2 OR MORE VIEWS RIGHT   FINDINGS:  There is demineralization of the visualized osseous structures more than expected for patient's age     No acute displaced fractures or dislocations.     There are some degenerative changes of the proximal and distal interphalangeal joints with degenerative changes of the tarsal rows and tarsal metatarsal joints articular spaces are otherwise preserved with smooth articular surfaces     No blastic or lytic lesions.     A soft tissue defect is identified by the great toe which might be related to an ulceration     On the provided  images there is no evidence of primary and/or secondary signs to suggest the presence of osteomyelitis these, however, might be lacking on plain films and therefore if clinically indicated other imaging modalities might prove helpful for further assessment     Impression:     Changes suggestive of ulceration.     Demineralization of the visualized osseous structures.     Degenerative changes.     No findings on the provided images to suggest osteomyelitis        Electronically signed by: Kenneth Pierce  Date:                                            09/07/2022  Time:                                           13:10  Plan:     Plan of Care:    Reviewed chart and examine patient. She is doing a bit better  Wound care: continue per Dr Velazquez's usual orders   Offloading: sacral at all times; use advanced bed at home  Nutrition: Must have a high protein diet to support wound  healing;   this should be over 100g protein /day (if no kidney issues); Also rec MVI along with vit C, vit D, zinc and Adalberto  Diabetes: must have a strict diabetic diet,  Smoker: must stop smoking: explained the negative impact this has own not only the wounds (delayed healing) but overall health as well; But even after discussion, I noted daughter tell pt she would  her cigarettes on way home  Return to clinic 1 week           The time spent including preparing to see the patient, obtaining patient history and assessment, evaluation of the plan of care, patient/caregiver counseling and education, orders, documentation, coordination of care, and other professional medical management activities for today's encounter was 20 minutes.

## 2022-11-23 NOTE — PATIENT INSTRUCTIONS
Pt seen today by: Dr Joselin Lake    [facility] DRESSING INSTRUCTIONS:    Home Health to see pt on Monday and Friday, wound clinic on Wednesday      Patient and/or family may be asked to assist on other days. Home Health to leave enough supplies for family member to change dressings and demonstrate proper dressing change.      Okay to return the wound vac      Wound location: Sacrum    Cleanse wound with vashe  Apply sensicare to reddened skin around the wound  Pack with Aquacel Silver  Cover with exudry dressing and secure with cover roll tape (do not tape reddened area - be sure to cover all of red area with exudry then tape) or may use large Foam border dressing  Change dressing daily or if soiled or not intact      Wound location: Right great toe     Keep toe clean and dry, cover with bandaid, change as needed        Return visit: Nov 30th, 2022 at 1:00      Wound may have been debrided in clinic: if so, WHAT YOU NEED TO KNOW:    Debridement is the removal of infected, damaged, or dead tissue so a wound can heal properly. Your wound may need more than one debridement. Debridement can cause bleeding, and a small amount of blood is expected.  AFTER A DEBRIDEMENT:    Keep your wound clean and dry. Do not remove the dressing unless instructed.  Follow the wound care orders provided to you or your home health care provider.  If you have pain, take over the counter pain relievers or pain medication if prescribed.  Elevate the wound and limit excessive activity to prevent bleeding and/or swelling in your wound.  If you see blood coming through the dressing, apply gauze and tape over the dressing and hold firm pressure to the wound with your hand for 5-10 minutes continuously, without peeking, to help the bleeding stop.  Contact [facility] wound care team at [phone] or go to the nearest Emergency department if:    You have a fever greater than 101 taken by mouth.  Your pain gets  worse or does not go away, even after taking your regular pain medicine.  Your skin around your wound is red, hot, swollen, or draining pus.  You have bleeding that continues to come through the dressing after holding pressure for 10 minutes       Compression: You may be using a compressive type of dressings to control edema: If so, keep your compression wrap or tubigrip in place. Do not get them wet, they should feel snug. If they feel tight, or cause pain in any way,  elevate your legs above your heart for 15 minutes. If the wraps still cause pain or you can not tolerate compression,  please remove and notify the clinic or your home health.     Nutrition:  The current daily value (%DV) for protein is 50 grams per day and is meant as a general goal for most people. Further increasing your dietary protein intake is very important for wound healing. Typically one needs over 100g of protein per day to help with wound healing needs.  If you are a dialysis patient or have problems with your kidneys, talk to your Nephrologist about how much protein you can take in with your condition.  Examples of high protein items that can be added to your diet include: eggs, chicken, red meats, almonds, cottage cheese, Greek yogurt, beans, and peanut butter.  Fortified protein bars, shakes and drinks can add 15-30 additional grams of protein per serving.   Also add:   1 daily general multivitamin   Adalberto : 1 packet twice daily   Vitamin C : 500mg twice daily   Zinc 220 mg daily  Vit D : once daily    Offloading   Offload your wound. This means to reduce pressure on and around the wound that reduces blood flow to the wound and prevents healing. Your wound care team will discuss specific ways for you to offload your specific wound. Common offloading strategies include:    Turn or reposition every 2 hours or sooner  Use pillows, wedges, ROHO wheelchair cushions or other special devices like boots and shoes to lift the wound off of hard  surfaces  Alternating Low Air-loss (ALAL) mattress may be ordered  Padded dressings can reduce wound pressure        Call our [facility] wound clinic for questions/concerns a [phone] .

## 2022-11-23 NOTE — PROCEDURES
Procedures    Chemical Cauterization      Performed by: Dr Lake  Time Out Taken: Yes.   Pain Control: topical lidocaine  Procedural Pain: 0  Post Procedural Pain: 0  Procedure was tolerated well.   General notes:used silver nitrate stick on open sacral wound with hypergranulation

## 2022-11-30 ENCOUNTER — HOSPITAL ENCOUNTER (OUTPATIENT)
Dept: WOUND CARE | Facility: HOSPITAL | Age: 72
Discharge: HOME | End: 2022-11-30
Attending: EMERGENCY MEDICINE
Payer: MEDICARE

## 2022-11-30 ENCOUNTER — HOSPITAL ENCOUNTER (OUTPATIENT)
Dept: RADIOLOGY | Facility: HOSPITAL | Age: 72
Discharge: HOME | End: 2022-11-30
Attending: EMERGENCY MEDICINE
Payer: MEDICARE

## 2022-11-30 VITALS
WEIGHT: 145 LBS | HEART RATE: 116 BPM | RESPIRATION RATE: 18 BRPM | HEIGHT: 60 IN | TEMPERATURE: 98 F | SYSTOLIC BLOOD PRESSURE: 122 MMHG | DIASTOLIC BLOOD PRESSURE: 85 MMHG | BODY MASS INDEX: 28.47 KG/M2

## 2022-11-30 DIAGNOSIS — E46 MALNUTRITION, UNSPECIFIED TYPE: ICD-10-CM

## 2022-11-30 DIAGNOSIS — L97.509 TYPE 2 DIABETES MELLITUS WITH FOOT ULCER, WITHOUT LONG-TERM CURRENT USE OF INSULIN: ICD-10-CM

## 2022-11-30 DIAGNOSIS — L97.421 DIABETIC ULCER OF LEFT HEEL ASSOCIATED WITH TYPE 2 DIABETES MELLITUS, LIMITED TO BREAKDOWN OF SKIN: ICD-10-CM

## 2022-11-30 DIAGNOSIS — S91.302A OPEN WOUND OF LEFT FOOT EXCEPT TOES WITH COMPLICATION, INITIAL ENCOUNTER: Primary | ICD-10-CM

## 2022-11-30 DIAGNOSIS — E11.621 DIABETIC ULCER OF RIGHT GREAT TOE: ICD-10-CM

## 2022-11-30 DIAGNOSIS — E11.42 DIABETIC POLYNEUROPATHY ASSOCIATED WITH TYPE 2 DIABETES MELLITUS: ICD-10-CM

## 2022-11-30 DIAGNOSIS — E11.621 TYPE 2 DIABETES MELLITUS WITH FOOT ULCER, WITHOUT LONG-TERM CURRENT USE OF INSULIN: ICD-10-CM

## 2022-11-30 DIAGNOSIS — G89.29 OTHER CHRONIC PAIN: ICD-10-CM

## 2022-11-30 DIAGNOSIS — L97.519 DIABETIC ULCER OF RIGHT GREAT TOE: ICD-10-CM

## 2022-11-30 DIAGNOSIS — E11.621 DIABETIC ULCER OF LEFT HEEL ASSOCIATED WITH TYPE 2 DIABETES MELLITUS, LIMITED TO BREAKDOWN OF SKIN: ICD-10-CM

## 2022-11-30 DIAGNOSIS — F17.200 TOBACCO DEPENDENCY: ICD-10-CM

## 2022-11-30 DIAGNOSIS — F44.4 FUNCTIONAL PARAPARESIS: ICD-10-CM

## 2022-11-30 DIAGNOSIS — L89.154 PRESSURE ULCER OF SACRAL REGION, STAGE 4: ICD-10-CM

## 2022-11-30 LAB — POCT GLUCOSE: 402 MG/DL (ref 70–110)

## 2022-11-30 PROCEDURE — 11042 DBRDMT SUBQ TIS 1ST 20SQCM/<: CPT

## 2022-11-30 PROCEDURE — 11046 DBRDMT MUSC&/FSCA EA ADDL: CPT

## 2022-11-30 PROCEDURE — 27000999 HC MEDICAL RECORD PHOTO DOCUMENTATION

## 2022-11-30 PROCEDURE — 73620 X-RAY EXAM OF FOOT: CPT | Mod: TC,LT

## 2022-11-30 PROCEDURE — 11043 DBRDMT MUSC&/FSCA 1ST 20/<: CPT

## 2022-11-30 PROCEDURE — 87070 CULTURE OTHR SPECIMN AEROBIC: CPT

## 2022-11-30 RX ORDER — OXYCODONE AND ACETAMINOPHEN 10; 325 MG/1; MG/1
1 TABLET ORAL EVERY 4 HOURS PRN
COMMUNITY

## 2022-11-30 NOTE — PATIENT INSTRUCTIONS
Pt seen today by: Dr Marlys Velazquez    [facility] DRESSING INSTRU[facility]:    Home Health to see pt daily               Wound location: Sacrum    Cleanse wound with vashe  Apply sensicare to reddened skin around the wound  Pack with Aquacel Silver  Cover with exudry dressing and secure with cover roll tape (do not tape reddened area - be sure to cover all of red area with exudry then tape) or may use large Foam border dressing  Change dressing daily or if soiled or not intact      Wound location: Right great toe     Keep toe clean and dry, cover with bandaid, change as needed      Wound Location: Left planter foot  Cleanse both legs and feet with soap and water  Cleanse wound with vasche  Apply santyl under vasche moistened mesalt to the wound bed  Cover with exudry  Secure with cast padding and a kerlex     Must wear heel lift boots when in bed        Return visit: Go to the emergency room to be admitted for debridement and foot infection      Wound may have been debrided in clinic: if so, WHAT YOU NEED TO KNOW:    Debridement is the removal of infected, damaged, or dead tissue so a wound can heal properly. Your wound may need more than one debridement. Debridement can cause bleeding, and a small amount of blood is expected.  AFTER A DEBRIDEMENT:    Keep your wound clean and dry. Do not remove the dressing unless instructed.  Follow the wound care orders provided to you or your home health care provider.  If you have pain, take over the counter pain relievers or pain medication if prescribed.  Elevate the wound and limit excessive activity to prevent bleeding and/or swelling in your wound.  If you see blood coming through the dressing, apply gauze and tape over the dressing and hold firm pressure to the wound with your hand for 5-10 minutes continuously, without peeking, to help the bleeding stop.  Contact [facility] wound care team at [phone] or go to the nearest Emergency department  if:    You have a fever greater than 101 taken by mouth.  Your pain gets worse or does not go away, even after taking your regular pain medicine.  Your skin around your wound is red, hot, swollen, or draining pus.  You have bleeding that continues to come through the dressing after holding pressure for 10 minutes       Compression: You may be using a compressive type of dressings to control edema: If so, keep your compression wrap or tubigrip in place. Do not get them wet, they should feel snug. If they feel tight, or cause pain in any way,  elevate your legs above your heart for 15 minutes. If the wraps still cause pain or you can not tolerate compression,  please remove and notify the clinic or your home health.     Nutrition:  The current daily value (%DV) for protein is 50 grams per day and is meant as a general goal for most people. Further increasing your dietary protein intake is very important for wound healing. Typically one needs over 100g of protein per day to help with wound healing needs.  If you are a dialysis patient or have problems with your kidneys, talk to your Nephrologist about how much protein you can take in with your condition.  Examples of high protein items that can be added to your diet include: eggs, chicken, red meats, almonds, cottage cheese, Greek yogurt, beans, and peanut butter.  Fortified protein bars, shakes and drinks can add 15-30 additional grams of protein per serving.   Also add:   1 daily general multivitamin   Adalberto : 1 packet twice daily   Vitamin C : 500mg twice daily   Zinc 220 mg daily  Vit D : once daily    Offloading   Offload your wound. This means to reduce pressure on and around the wound that reduces blood flow to the wound and prevents healing. Your wound care team will discuss specific ways for you to offload your specific wound. Common offloading strategies include:    Turn or reposition every 2 hours or sooner  Use pillows, wedges, ROHO wheelchair cushions or  other special devices like boots and shoes to lift the wound off of hard surfaces  Alternating Low Air-loss (ALAL) mattress may be ordered  Padded dressings can reduce wound pressure        Call our [facility] wound clinic for questions/concerns a [phone] .

## 2022-12-01 ENCOUNTER — HOSPITAL ENCOUNTER (EMERGENCY)
Facility: HOSPITAL | Age: 72
Discharge: LEFT BEFORE DISCHARGE | End: 2022-12-01
Payer: MEDICARE

## 2022-12-01 VITALS
HEART RATE: 118 BPM | OXYGEN SATURATION: 99 % | TEMPERATURE: 98 F | SYSTOLIC BLOOD PRESSURE: 100 MMHG | BODY MASS INDEX: 27.48 KG/M2 | HEIGHT: 60 IN | RESPIRATION RATE: 16 BRPM | WEIGHT: 140 LBS | DIASTOLIC BLOOD PRESSURE: 63 MMHG

## 2022-12-01 DIAGNOSIS — T14.8XXA WOUND INFECTION: ICD-10-CM

## 2022-12-01 DIAGNOSIS — L08.9 WOUND INFECTION: ICD-10-CM

## 2022-12-01 PROBLEM — G89.29 OTHER CHRONIC PAIN: Status: ACTIVE | Noted: 2022-12-01

## 2022-12-01 PROBLEM — S91.302A OPEN WOUND OF LEFT FOOT EXCEPT TOES WITH COMPLICATION: Status: ACTIVE | Noted: 2022-12-01

## 2022-12-01 LAB
ALBUMIN SERPL-MCNC: 2.5 GM/DL (ref 3.4–4.8)
ALBUMIN/GLOB SERPL: 0.6 RATIO (ref 1.1–2)
ALP SERPL-CCNC: 77 UNIT/L (ref 40–150)
ALT SERPL-CCNC: 5 UNIT/L (ref 0–55)
AST SERPL-CCNC: 9 UNIT/L (ref 5–34)
BASOPHILS # BLD AUTO: 0.04 X10(3)/MCL (ref 0–0.2)
BASOPHILS NFR BLD AUTO: 0.2 %
BILIRUBIN DIRECT+TOT PNL SERPL-MCNC: 0.6 MG/DL
BUN SERPL-MCNC: 13.1 MG/DL (ref 9.8–20.1)
CALCIUM SERPL-MCNC: 8.7 MG/DL (ref 8.4–10.2)
CHLORIDE SERPL-SCNC: 98 MMOL/L (ref 98–107)
CO2 SERPL-SCNC: 20 MMOL/L (ref 23–31)
CREAT SERPL-MCNC: 0.81 MG/DL (ref 0.55–1.02)
EOSINOPHIL # BLD AUTO: 0.08 X10(3)/MCL (ref 0–0.9)
EOSINOPHIL NFR BLD AUTO: 0.4 %
ERYTHROCYTE [DISTWIDTH] IN BLOOD BY AUTOMATED COUNT: 15.4 % (ref 11.5–17)
GFR SERPLBLD CREATININE-BSD FMLA CKD-EPI: >60 MLS/MIN/1.73/M2
GLOBULIN SER-MCNC: 4 GM/DL (ref 2.4–3.5)
GLUCOSE SERPL-MCNC: 280 MG/DL (ref 82–115)
HCT VFR BLD AUTO: 37.1 % (ref 37–47)
HGB BLD-MCNC: 12.2 GM/DL (ref 12–16)
IMM GRANULOCYTES # BLD AUTO: 0.2 X10(3)/MCL (ref 0–0.04)
IMM GRANULOCYTES NFR BLD AUTO: 0.9 %
LACTATE SERPL-SCNC: 1.6 MMOL/L (ref 0.5–2.2)
LYMPHOCYTES # BLD AUTO: 1.91 X10(3)/MCL (ref 0.6–4.6)
LYMPHOCYTES NFR BLD AUTO: 8.9 %
MCH RBC QN AUTO: 28.5 PG (ref 27–31)
MCHC RBC AUTO-ENTMCNC: 32.9 MG/DL (ref 33–36)
MCV RBC AUTO: 86.7 FL (ref 80–94)
MONOCYTES # BLD AUTO: 0.94 X10(3)/MCL (ref 0.1–1.3)
MONOCYTES NFR BLD AUTO: 4.4 %
NEUTROPHILS # BLD AUTO: 18.2 X10(3)/MCL (ref 2.1–9.2)
NEUTROPHILS NFR BLD AUTO: 85.2 %
NRBC BLD AUTO-RTO: 0 %
PLATELET # BLD AUTO: 552 X10(3)/MCL (ref 130–400)
PMV BLD AUTO: 9.5 FL (ref 7.4–10.4)
POTASSIUM SERPL-SCNC: 4 MMOL/L (ref 3.5–5.1)
PROT SERPL-MCNC: 6.5 GM/DL (ref 5.8–7.6)
RBC # BLD AUTO: 4.28 X10(6)/MCL (ref 4.2–5.4)
SODIUM SERPL-SCNC: 129 MMOL/L (ref 136–145)
WBC # SPEC AUTO: 21.4 X10(3)/MCL (ref 4.5–11.5)

## 2022-12-01 PROCEDURE — 80053 COMPREHEN METABOLIC PANEL: CPT | Performed by: PHYSICIAN ASSISTANT

## 2022-12-01 PROCEDURE — 25000003 PHARM REV CODE 250: Performed by: PHYSICIAN ASSISTANT

## 2022-12-01 PROCEDURE — 85025 COMPLETE CBC W/AUTO DIFF WBC: CPT | Performed by: PHYSICIAN ASSISTANT

## 2022-12-01 PROCEDURE — 83605 ASSAY OF LACTIC ACID: CPT | Performed by: PHYSICIAN ASSISTANT

## 2022-12-01 PROCEDURE — 87040 BLOOD CULTURE FOR BACTERIA: CPT | Performed by: PHYSICIAN ASSISTANT

## 2022-12-01 PROCEDURE — 99284 EMERGENCY DEPT VISIT MOD MDM: CPT | Mod: 25

## 2022-12-01 RX ADMIN — SODIUM CHLORIDE 1000 ML: 9 INJECTION, SOLUTION INTRAVENOUS at 12:12

## 2022-12-01 NOTE — PROCEDURES
"Debridement    Date/Time: 11/30/2022 1:00 PM  Performed by: Marlys Velazquez DO  Authorized by: Marlys Velazquez DO     Time out: Immediately prior to procedure a "time out" was called to verify the correct patient, procedure, equipment, support staff and site/side marked as required.    Consent Done?:  Yes (Verbal)    Preparation: Patient was prepped and draped in usual sterile fashion    Local anesthesia used?: Yes    Local anesthetic:  Lidocaine 1% without epinephrine    Wound Details:    Location:  Left foot    Location:  Left Plantar    Type of Debridement:  Excisional       Length (cm):  8       Area (sq cm):  44       Width (cm):  5.5       Percent Debrided (%):  100       Depth (cm):  0.6       Total Area Debrided (sq cm):  44    Depth of debridement:  Muscle/fascia/tendon    Tissue debrided:  Subcutaneous, Tendon and Fascia    Devitalized tissue debrided:  Necrotic/Eschar, Fibrin and Slough    Instruments:  Blade, Forceps and Scissors    2nd Wound Details:     Debridement - 2nd Wound - General Location: sacrum.    Type of Debridement:  Excisional       Length (cm):  1.5       Area (sq cm):  1.5       Width (cm):  1       Percent Debrided (%):  100       Depth (cm):  0.7       Total Area Debrided (sq cm):  1.5    Depth of debridement:  Subcutaneous tissue    Tissue debrided:  Subcutaneous    Devitalized tissue debrided:  Biofilm and Exudate    Bleeding:  Moderate  Hemostasis Achieved: Yes    Method Used:  Pressure  Patient tolerance:  Patient tolerated the procedure well with no immediate complications     Anesthetized wound bed to be able to remove thick black odorous eschar and necrotic tissue. Reomoved milky, thick grey tissue and drainage as well. Wound appears to involved fascia with possible tendon exposure. Tissue c/s removed. Redness periwound extending to top of foot.  Sacral ulcer bed smaller and clean, thin tunneling tract around 4.5cm superior to right midline with tenderness illicited. No " drainage or odor.

## 2022-12-01 NOTE — PROGRESS NOTES
Subjective:       Patient ID: Niurka Mata is a 72 y.o. female.    Chief Complaint: Pressure Ulcer    HPI 72 yr female well known to our clinic for prior wound issue due to parapesis and wheelchair status. Ms. Hughes returned first week of September for a large sacral wound that she has had for atleast 6 months as well as a wound to the dorsal side of her great toe on right foot just below the nail. She restarted smoking for her 72th birthday. She is very  immobile and is bed confined due to weakness and pain.    On today's visit she has a very malodorous smell to her left foot - when we remove her sock she has a very large hanging black eschar to bottom of the foot with grey, mushy tissue beneath. It is not really painful on gross exam although she has severe neuropathy. She did not notice this until we did today (although HH RN is suppose to be moisturizing her feet / legs). She got a new hospital bed about a month ago and now thinks maybe it is from her feet rubbing against the bottom of bed for prolonged periods of time. She only gets out of bed to use bathroom and get dressed or go somewhere. Very limited mobility.  She denies fever or chills but does admit to waking with sweatiness recently.    She also recently had her pain meds increased by her pain management doctor as well as prednisone added due to increased buttock pain. I am going to debride the wound but have already advised her to go to ER from clinic for admission.    ROS:  General: afebrile, no chills, + weakness  ENT: no sore throat but did have some difficulty swallowing, no congestion  Eye: + glasses, no new vision loss  Lungs: No cough or sob  Cv: no chest pain or chf  GI : no nausea or vomiting / no diarrhea at this time    + UTIs, frequency  Musc: weakness in lower legs / chronic back pain with hx compression fx.  Neuro: + neuropathy, no stroke   Skin: + wounds to right great toe, sacrum, left foot  Neuralgia over buttock  skin    Objective:    Vital Signs  Temp: 98.4 °F (36.9 °C)  Temp src: Oral  Pulse: (!) 116  Resp: 18  BP: 122/85  BP Location: Right arm  Pain Score:   8  Pain Loc: Buttocks  Height and Weight  Height: 5' (152.4 cm)  Weight: 65.8 kg (145 lb)  BSA (Calculated - sq m): 1.67 sq meters  BMI (Calculated): 28.3  Weight in (lb) to have BMI = 25: 127.7]                                                                                                                              Physical Exam  Constitutional:       Appearance: Normal appearance. She is normal weight.   HENT:      Head: Normocephalic and atraumatic.      Nose: Nose normal.      Mouth/Throat:      Mouth: Mucous membranes are dry.   Eyes:      Extraocular Movements: Extraocular movements intact.   Cardiovascular:      Rate and Rhythm: Normal rate and regular rhythm.      Pulses:           Dorsalis pedis pulses are detected w/ Doppler on the left side.      Heart sounds: Normal heart sounds.   Pulmonary:      Effort: Pulmonary effort is normal.      Breath sounds: Normal breath sounds.   Abdominal:      General: Abdomen is flat.   Musculoskeletal:      Cervical back: Normal range of motion.      Right foot: Normal range of motion.      Left foot: Decreased range of motion.        Feet:       Comments: M atrophy legs b/l but passive movement. Left foot is swollen and had redness from plantar side around large odorous black eschar that wraps around top of foot. Right great toe ulcer is healed, faint pink indention from wound but no opening.   Feet:      Right foot:      Skin integrity: Dry skin present.      Toenail Condition: Fungal disease present.     Left foot:      Skin integrity: Ulcer, skin breakdown, erythema and warmth present.      Toenail Condition: Fungal disease present.  Skin:     Capillary Refill: Capillary refill takes 2 to 3 seconds.      Findings: Lesion: necrotic black eschar hanging from planar surface left foot with green, thick mushy  devitalized tissue beneath with strong odor. sacral ulcer with tunneling to right midline about 4cm superiorly.   Neurological:      Mental Status: She is alert. Mental status is at baseline.   Psychiatric:         Mood and Affect: Mood normal.       Assessment:       1. Open wound of left foot except toes with complication, initial encounter    2. Pressure ulcer of sacral region, stage 4    3. Type 2 diabetes mellitus with foot ulcer, without long-term current use of insulin    4. Diabetic ulcer of left heel associated with type 2 diabetes mellitus, limited to breakdown of skin    5. Diabetic ulcer of right great toe    6. Functional paraparesis    7. Diabetic polyneuropathy associated with type 2 diabetes mellitus    8. Malnutrition, unspecified type    9. Tobacco dependency    10. Other chronic pain           Altered Skin Integrity 10/12/22 1144 Right dorsal Toe, first #3 Diabetic Ulcer (Active)   10/12/22 1144   Altered Skin Integrity Present on Admission: yes   Side: Right   Orientation: dorsal   Location: Toe, first   Wound Number: #3   Is this injury device related?: No   Primary Wound Type: Diabetic ulc   Description of Altered Skin Integrity:    Ankle-Brachial Index: 11/30/22 R dp 0.62 pt 0.74   Pulses: palpable x 2 Doopler Biphasic   Removal Indication and Assessment:    Wound Outcome:    (Retired) Wound Length (cm):    (Retired) Wound Width (cm):    (Retired) Depth (cm):    Wound Description (Comments):    Removal Indications:    Wound Image   11/30/22 1351   Description of Altered Skin Integrity Partial thickness tissue loss. Shallow open ulcer with a red or pink wound bed, without slough. Intact or Open/Ruptured Serum-filled blister. 11/30/22 1351   Dressing Appearance Intact;Moist drainage 11/30/22 1351   Drainage Amount Small 11/30/22 1351   Drainage Characteristics/Odor Serosanguineous;Yellow 11/30/22 1351   Appearance Pink 11/30/22 1351   Tissue loss description Partial thickness 11/30/22 1351    Black (%), Wound Tissue Color 0 % 11/30/22 1351   Red (%), Wound Tissue Color 100 % 11/30/22 1351   Yellow (%), Wound Tissue Color 0 % 11/30/22 1351   Periwound Area Intact 11/30/22 1351   Wound Edges Defined 11/30/22 1351   Wound Length (cm) 0.2 cm 11/30/22 1351   Wound Width (cm) 0.3 cm 11/30/22 1351   Wound Depth (cm) 0.2 cm 11/30/22 1351   Wound Volume (cm^3) 0.012 cm^3 11/30/22 1351   Wound Surface Area (cm^2) 0.06 cm^2 11/30/22 1351   Care Cleansed with:;Soap and water;Wound cleanser;Applied: 11/30/22 1351            Altered Skin Integrity 11/30/22 1353 Left plantar Foot #4 Diabetic Ulcer Full thickness tissue loss. Subcutaneous fat may be visible but bone, tendon or muscle are not exposed (Active)   11/30/22 1353   Altered Skin Integrity Present on Admission: yes   Side: Left   Orientation: plantar   Location: Foot   Wound Number: #4   Is this injury device related?: No   Primary Wound Type: Diabetic ulc   Description of Altered Skin Integrity: Full thickness tissue loss. Subcutaneous fat may be visible but bone, tendon or muscle are not exposed   Ankle-Brachial Index: ABIs done on 11/30/2022 dp 0.94 pt 1.01   Pulses: Doopler Biphasic x2/ palpable x2   Removal Indication and Assessment:    Wound Outcome:    (Retired) Wound Length (cm):    (Retired) Wound Width (cm):    (Retired) Depth (cm):    Wound Description (Comments):    Removal Indications:    Wound Image   11/30/22 1355   Description of Altered Skin Integrity Full thickness tissue loss. Subcutaneous fat may be visible but bone, tendon or muscle are not exposed 11/30/22 1355   Dressing Appearance Open to air 11/30/22 1355   Appearance Pink;Yellow;Necrotic 11/30/22 1355   Tissue loss description Full thickness 11/30/22 1355   Black (%), Wound Tissue Color 80 % 11/30/22 1355   Red (%), Wound Tissue Color 10 % 11/30/22 1355   Yellow (%), Wound Tissue Color 10 % 11/30/22 1355   Periwound Area Intact;Redness 11/30/22 1355   Wound Length (cm) 8 cm 11/30/22  1355   Wound Width (cm) 5.5 cm 11/30/22 1355   Wound Depth (cm) 0.6 cm 11/30/22 1355   Wound Volume (cm^3) 26.4 cm^3 11/30/22 1355   Wound Surface Area (cm^2) 44 cm^2 11/30/22 1355   Care Cleansed with:;Soap and water;Wound cleanser;Applied: 11/30/22 1355   Dressing Removed;Changed;Absorptive Pad;Cast padding;Rolled gauze 11/30/22 1355            (Retired) Wound 10/12/22 1144 Other (comment) medial Sacral Spine #1 (Active)   10/12/22 1144    Pre-existing: Yes   Primary Wound Type: Other   Side:    Orientation: medial   Location: Sacral Spine   Wound Number: #1   Ankle-Brachial Index:    Pulses:    Removal Indication and Assessment:    Wound Outcome:    (Retired) Wound Type:    (Retired) Wound Length (cm):    (Retired) Wound Width (cm):    (Retired) Depth (cm):    Wound Description (Comments):    Removal Indications:    Wound Image   11/30/22 1352   Dressing Appearance Intact;Moist drainage 11/30/22 1352   Drainage Amount Moderate 11/30/22 1352   Drainage Characteristics/Odor Serosanguineous 11/30/22 1352   Appearance Pink 11/30/22 1352   Tissue loss description Full thickness 11/30/22 1352   Black (%), Wound Tissue Color 0 % 11/30/22 1352   Red (%), Wound Tissue Color 100 % 11/30/22 1352   Yellow (%), Wound Tissue Color 0 % 11/30/22 1352   Periwound Area Intact 11/30/22 1352   Wound Edges Defined 11/30/22 1352   Wound Length (cm) 1.5 cm 11/30/22 1352   Wound Width (cm) 1 cm 11/30/22 1352   Wound Depth (cm) 0.7 cm 11/30/22 1352   Wound Volume (cm^3) 1.05 cm^3 11/30/22 1352   Wound Surface Area (cm^2) 1.5 cm^2 11/30/22 1352   Undermining (depth (cm)/location) 4.6  between 12-2:00 11/30/22 1352   Care Cleansed with:;Wound cleanser;Applied: 11/30/22 1352   Dressing Removed;Changed;Calcium alginate;Silver;Gauze;Absorptive Pad 11/30/22 1352           Plan:         Problem list:    L foot ulcer - necrotic tissue with devitalized tissue - referred to ER and tissue c/s sent off 11/30/22  Sacral pressure ulcer, stage IV:  2020, returned early 2022, return to clinic 8/31/22- improving.  Significant periwound dermatitis rash noted on 10/19/2022  Right dorsal great toe chronic flexion with dorsal DFU: exposed bone/joint capsule noted 9/7/22, cx negative: resolved 11/30/22  Prior left heel diabetic/pressure ulcer 2020  Severe BLE PAD:  previous stents  2019 with CIS/[place]; [facility] US early May 2020:  high grade stenosis bilateral common fem arteries.  CIS 11/4/20: Balloon angioplasty Laser atherectomy of Left SFA pop segment and Balloon angioplasty Left KRISTEN-Dr Fleming  Diabetes with neuropathy, hemoglobin A1c  May 2020: > 14, Down to 7.0 August 2020: unknown recent  bilateral lower extremity lymphedema with chronic stasis changes.  Malnutrition, prealbumin  17.0 September 2020  Deconditioning and failure to thrive  Hypertension  Dyslipidemia  CAD  Heavy/chronic smoker  Lab Results   Component Value Date    WBC 12.8 (H) 10/05/2022    HGB 12.8 10/05/2022    HCT 39.0 10/05/2022    MCV 84.4 10/05/2022     (H) 10/05/2022         CMP  Sodium Level   Date Value Ref Range Status   10/05/2022 133 (L) 136 - 145 mmol/L Final     Potassium Level   Date Value Ref Range Status   10/05/2022 3.9 3.5 - 5.1 mmol/L Final     Carbon Dioxide   Date Value Ref Range Status   10/05/2022 26 23 - 31 mmol/L Final     Blood Urea Nitrogen   Date Value Ref Range Status   10/05/2022 9.0 (L) 9.8 - 20.1 mg/dL Final     Creatinine   Date Value Ref Range Status   10/05/2022 0.64 0.55 - 1.02 mg/dL Final     Calcium Level Total   Date Value Ref Range Status   10/05/2022 8.9 8.4 - 10.2 mg/dL Final     Albumin Level   Date Value Ref Range Status   10/05/2022 2.4 (L) 3.4 - 4.8 gm/dL Final     Bilirubin Total   Date Value Ref Range Status   10/05/2022 0.3 <=1.5 mg/dL Final     Alkaline Phosphatase   Date Value Ref Range Status   10/05/2022 90 40 - 150 unit/L Final     Aspartate Aminotransferase   Date Value Ref Range Status   10/05/2022 8 5 - 34 unit/L Final     Alanine  Aminotransferase   Date Value Ref Range Status   10/05/2022 7 0 - 55 unit/L Final     Estimated GFR-Non    Date Value Ref Range Status   06/20/2022 >60 mls/min/1.73/m2 Final     Lab Results   Component Value Date    HGBA1C 6.3 06/15/2022     Results for orders placed or performed during the hospital encounter of 10/06/22 (from the past 2160 hour(s))   CT Pelvis With Contrast    Impression    Sacral decubitus seen with minimal fluid collection is seen within the decubitus ulcer as outlined above.  Collection seems to communicate with the open wound.      Electronically signed by: Libby Rowe  Date:    10/06/2022  Time:    12:05       EXAMINATION:  XR TOE 2 OR MORE VIEWS RIGHT   FINDINGS:  There is demineralization of the visualized osseous structures more than expected for patient's age     No acute displaced fractures or dislocations.     There are some degenerative changes of the proximal and distal interphalangeal joints with degenerative changes of the tarsal rows and tarsal metatarsal joints articular spaces are otherwise preserved with smooth articular surfaces     No blastic or lytic lesions.     A soft tissue defect is identified by the great toe which might be related to an ulceration     On the provided images there is no evidence of primary and/or secondary signs to suggest the presence of osteomyelitis these, however, might be lacking on plain films and therefore if clinically indicated other imaging modalities might prove helpful for further assessment     Impression:     Changes suggestive of ulceration.     Demineralization of the visualized osseous structures.     Degenerative changes.     No findings on the provided images to suggest osteomyelitis                 Plan:     Plan of Care:    I debrided patient's wounds today but have grave concern for the new wound we have found on the bottom of her left foot. I do not know if it is bc she is on so many pain meds or if she truly  is so neuropathic that was not aware of this deep tissue wound on her foot. Also how could anyone who was doing wound care not smelled it. I think she needs admission for surgical debridement and IV antibiotics. She was tachycardic today so may even be trying to get septic. I offered to call ER so they would be expecting her if she would go from clinic -advised of risks if delayed care. She declined.  I ordered a foot film to be sure there was not evidence of gas gangrene in the soft tissue as patient said she would not go to ER today as advised but would go tomorrow. I reviewed the films but did not see evidence of such. I have checked the results today and it has not yet been read by radiology.  I wrote her daughter for preliminary antibiotics with wide coverage range (cleocin / cipro) until agrees to be seen in ER for admission. Tissue c/s of left foot wound was sent off.   I cleaned her sacral wound - it looks good although she still has pain along tunneling portion of wound bed. Was not approved by insurance for an ultrasound to see if we could find a pocket of anything in this track.   I dermabonded her right great toe healed wound site to make it a little stronger.   I am very concerned with her immobility how this new foot wound is going to set her sacral wound back.   She was advised of critical nature of her new wound and need to go to ER for admission ASAP.  We will follow her inpatient when she gets admitted.

## 2022-12-01 NOTE — FIRST PROVIDER EVALUATION
Medical screening examination initiated.  I have conducted a focused provider triage encounter, findings are as follows:    Chief Complaint   Patient presents with    Wound Infection     Daughter reports pt sees wound care once a week for sacral wound & right foot wound. States new wound to bottom of left foot noted yesterday - had debridement yesterday by Dr. Ruiz. Copious purulent drainage & foul odor noted to left foot. Denies fever.     Brief history of present illness: 72 y.o. female presents to the ED with worsening wound to plantar surface of left foot. Daughter states they are unsure how long it has been there however notes she goes to wound care for other wounds and it was not noted. Denies fever, chills. Sees wound care every Wednesday and has home health come to the house twice a week.     Vitals:    12/01/22 1028 12/01/22 1036   BP: 100/63    Pulse: (!) 118    Resp: 16    Temp: 98.2 °F (36.8 °C)    TempSrc: Oral    SpO2: 99%    Weight:  63.5 kg (140 lb)   Height:  5' (1.524 m)     Pertinent physical exam:  Awake, alert, non-labored respirations, large wound to planar surface of left foot, purulent drainage and foul odor noted    Brief workup plan:  labs, imaging, IVF and medications     Preliminary workup initiated; this workup will be continued and followed by the physician or advanced practice provider that is assigned to the patient when roomed.

## 2022-12-02 LAB — BACTERIA SPEC CULT: ABNORMAL

## 2022-12-06 LAB
BACTERIA BLD CULT: NORMAL
BACTERIA BLD CULT: NORMAL

## 2022-12-16 ENCOUNTER — LAB REQUISITION (OUTPATIENT)
Dept: LAB | Facility: HOSPITAL | Age: 72
End: 2022-12-16
Payer: MEDICARE

## 2022-12-16 DIAGNOSIS — N39.0 URINARY TRACT INFECTION, SITE NOT SPECIFIED: ICD-10-CM

## 2022-12-16 LAB
APPEARANCE UR: ABNORMAL
BACTERIA #/AREA URNS AUTO: ABNORMAL /HPF
BILIRUB UR QL STRIP.AUTO: NEGATIVE MG/DL
COLOR UR AUTO: YELLOW
GLUCOSE UR QL STRIP.AUTO: NEGATIVE MG/DL
KETONES UR QL STRIP.AUTO: NEGATIVE MG/DL
LEUKOCYTE ESTERASE UR QL STRIP.AUTO: ABNORMAL UNIT/L
NITRITE UR QL STRIP.AUTO: NEGATIVE
PH UR STRIP.AUTO: 5 [PH]
PROT UR QL STRIP.AUTO: 30 MG/DL
RBC #/AREA URNS AUTO: ABNORMAL /HPF
RBC UR QL AUTO: ABNORMAL UNIT/L
SP GR UR STRIP.AUTO: >=1.03
SQUAMOUS #/AREA URNS AUTO: ABNORMAL /HPF
UROBILINOGEN UR STRIP-ACNC: 0.2 MG/DL
WBC #/AREA URNS AUTO: >100 /HPF
YEAST URNS QL MICRO: ABNORMAL /HPF

## 2022-12-16 PROCEDURE — 81003 URINALYSIS AUTO W/O SCOPE: CPT | Performed by: FAMILY MEDICINE

## 2022-12-16 PROCEDURE — 81001 URINALYSIS AUTO W/SCOPE: CPT | Performed by: FAMILY MEDICINE

## 2022-12-16 PROCEDURE — 87077 CULTURE AEROBIC IDENTIFY: CPT | Performed by: FAMILY MEDICINE

## 2022-12-19 LAB — BACTERIA UR CULT: ABNORMAL

## 2022-12-27 ENCOUNTER — DOCUMENTATION ONLY (OUTPATIENT)
Dept: ADMINISTRATIVE | Facility: HOSPITAL | Age: 72
End: 2022-12-27
Payer: MEDICARE